# Patient Record
Sex: FEMALE | Race: WHITE | NOT HISPANIC OR LATINO | Employment: OTHER | URBAN - METROPOLITAN AREA
[De-identification: names, ages, dates, MRNs, and addresses within clinical notes are randomized per-mention and may not be internally consistent; named-entity substitution may affect disease eponyms.]

---

## 2017-01-09 ENCOUNTER — COMMUNICATION - HEALTHEAST (OUTPATIENT)
Dept: UROLOGY | Facility: CLINIC | Age: 37
End: 2017-01-09

## 2017-01-09 ENCOUNTER — COMMUNICATION - HEALTHEAST (OUTPATIENT)
Dept: FAMILY MEDICINE | Facility: CLINIC | Age: 37
End: 2017-01-09

## 2017-04-05 ENCOUNTER — COMMUNICATION - HEALTHEAST (OUTPATIENT)
Dept: FAMILY MEDICINE | Facility: CLINIC | Age: 37
End: 2017-04-05

## 2017-04-05 DIAGNOSIS — G43.909 MIGRAINE HEADACHE: ICD-10-CM

## 2017-05-18 ENCOUNTER — COMMUNICATION - HEALTHEAST (OUTPATIENT)
Dept: FAMILY MEDICINE | Facility: CLINIC | Age: 37
End: 2017-05-18

## 2017-05-18 DIAGNOSIS — F41.9 ANXIETY: ICD-10-CM

## 2017-07-07 ENCOUNTER — COMMUNICATION - HEALTHEAST (OUTPATIENT)
Dept: FAMILY MEDICINE | Facility: CLINIC | Age: 37
End: 2017-07-07

## 2017-07-07 DIAGNOSIS — G43.909 MIGRAINE HEADACHE: ICD-10-CM

## 2017-09-15 ENCOUNTER — COMMUNICATION - HEALTHEAST (OUTPATIENT)
Dept: FAMILY MEDICINE | Facility: CLINIC | Age: 37
End: 2017-09-15

## 2017-09-15 DIAGNOSIS — G43.909 MIGRAINE HEADACHE: ICD-10-CM

## 2017-10-01 ENCOUNTER — TRANSFERRED RECORDS (OUTPATIENT)
Dept: HEALTH INFORMATION MANAGEMENT | Facility: CLINIC | Age: 37
End: 2017-10-01

## 2017-10-01 LAB — PAP SMEAR - HIM PATIENT REPORTED: NEGATIVE

## 2017-11-09 ENCOUNTER — OFFICE VISIT - HEALTHEAST (OUTPATIENT)
Dept: FAMILY MEDICINE | Facility: CLINIC | Age: 37
End: 2017-11-09

## 2017-11-09 DIAGNOSIS — F32.5 MAJOR DEPRESSION IN REMISSION (H): ICD-10-CM

## 2017-11-09 DIAGNOSIS — G43.909 MIGRAINE: ICD-10-CM

## 2017-11-09 DIAGNOSIS — Z00.00 ROUTINE GENERAL MEDICAL EXAMINATION AT A HEALTH CARE FACILITY: ICD-10-CM

## 2017-11-09 DIAGNOSIS — G43.909 MIGRAINE HEADACHE: ICD-10-CM

## 2017-11-09 LAB
CHOLEST SERPL-MCNC: 140 MG/DL
FASTING STATUS PATIENT QL REPORTED: NO
HDLC SERPL-MCNC: 40 MG/DL
LDLC SERPL CALC-MCNC: 81 MG/DL
TRIGL SERPL-MCNC: 95 MG/DL

## 2017-11-09 ASSESSMENT — MIFFLIN-ST. JEOR: SCORE: 1732.5

## 2017-11-14 LAB
HPV INTERPRETATION - HISTORICAL: NORMAL
HPV INTERPRETER - HISTORICAL: NORMAL

## 2017-11-15 LAB
BKR LAB AP ABNORMAL BLEEDING: NO
BKR LAB AP BIRTH CONTROL/HORMONES: NORMAL
BKR LAB AP CERVICAL APPEARANCE: NORMAL
BKR LAB AP GYN ADEQUACY: NORMAL
BKR LAB AP GYN INTERPRETATION: NORMAL
BKR LAB AP HPV REFLEX: NORMAL
BKR LAB AP LMP: NORMAL
BKR LAB AP PATIENT STATUS: NO
BKR LAB AP PREVIOUS ABNORMAL: NO
BKR LAB AP PREVIOUS NORMAL: 2013
HIGH RISK?: NO
PATH REPORT.COMMENTS IMP SPEC: NORMAL
RESULT FLAG (HE HISTORICAL CONVERSION): NORMAL

## 2017-11-23 ENCOUNTER — COMMUNICATION - HEALTHEAST (OUTPATIENT)
Dept: FAMILY MEDICINE | Facility: CLINIC | Age: 37
End: 2017-11-23

## 2017-11-23 DIAGNOSIS — F41.9 ANXIETY: ICD-10-CM

## 2018-02-13 ENCOUNTER — COMMUNICATION - HEALTHEAST (OUTPATIENT)
Dept: FAMILY MEDICINE | Facility: CLINIC | Age: 38
End: 2018-02-13

## 2018-02-13 DIAGNOSIS — L29.9 ITCHING: ICD-10-CM

## 2018-03-12 ENCOUNTER — OFFICE VISIT (OUTPATIENT)
Dept: FAMILY MEDICINE | Facility: CLINIC | Age: 38
End: 2018-03-12
Payer: COMMERCIAL

## 2018-03-12 VITALS
WEIGHT: 230 LBS | HEIGHT: 66 IN | DIASTOLIC BLOOD PRESSURE: 70 MMHG | BODY MASS INDEX: 36.96 KG/M2 | SYSTOLIC BLOOD PRESSURE: 115 MMHG | TEMPERATURE: 98 F

## 2018-03-12 DIAGNOSIS — J02.9 VIRAL PHARYNGITIS: ICD-10-CM

## 2018-03-12 DIAGNOSIS — R07.0 THROAT PAIN: Primary | ICD-10-CM

## 2018-03-12 LAB
DEPRECATED S PYO AG THROAT QL EIA: NORMAL
SPECIMEN SOURCE: NORMAL

## 2018-03-12 PROCEDURE — 87081 CULTURE SCREEN ONLY: CPT | Performed by: PHYSICIAN ASSISTANT

## 2018-03-12 PROCEDURE — 87880 STREP A ASSAY W/OPTIC: CPT | Performed by: PHYSICIAN ASSISTANT

## 2018-03-12 PROCEDURE — 99213 OFFICE O/P EST LOW 20 MIN: CPT | Performed by: PHYSICIAN ASSISTANT

## 2018-03-12 RX ORDER — SUMATRIPTAN 100 MG/1
TABLET, FILM COATED ORAL
Refills: 6 | COMMUNITY
Start: 2018-02-24 | End: 2018-11-19

## 2018-03-12 RX ORDER — VENLAFAXINE HYDROCHLORIDE 150 MG/1
CAPSULE, EXTENDED RELEASE ORAL
Refills: 1 | COMMUNITY
Start: 2017-08-20 | End: 2018-06-11

## 2018-03-12 NOTE — Clinical Note
Please abstract the following data from this visit with this patient into the appropriate field in Epic:  Pap smear done on this date: 10/2017 (approximately), by this group: [facility], results were NILM.

## 2018-03-12 NOTE — MR AVS SNAPSHOT
"              After Visit Summary   3/12/2018    Shannon Quiroz    MRN: [id number]           Patient Information     Date Of Birth          1980        Visit Information        Provider Department      3/12/2018 9:40 AM Yulissa Badillo PA-C [facility]        Today's Diagnoses     Throat pain    -  1    Viral pharyngitis          Care Instructions    You can use \"oncare\" which is a way to do testing for acute illness online          Follow-ups after your visit        Who to contact     Normal or non-critical lab and imaging results will be communicated to you by Synoste Oyhart, letter or phone within 4 business days after the clinic has received the results. If you do not hear from us within 7 days, please contact the clinic through Synoste Oyhart or phone. If you have a critical or abnormal lab result, we will notify you by phone as soon as possible.  Submit refill requests through Medocity or call your pharmacy and they will forward the refill request to us. Please allow 3 business days for your refill to be completed.          If you need to speak with a  for additional information , please call: [phone]           Additional Information About Your Visit        Synoste OyharPenBoutique Information     Medocity lets you send messages to your doctor, view your test results, renew your prescriptions, schedule appointments and more. To sign up, go to [email or web address] . Click on \"Log in\" on the left side of the screen, which will take you to the Welcome page. Then click on \"Sign up Now\" on the right side of the page.     You will be asked to enter the access code listed below, as well as some personal information. Please follow the directions to create your username and password.     Your access code is: [email or web address]  Expires: 6/10/2018 10:10 AM     Your access code will  in 90 days. If you need help or a new code, please call your [facility] or [phone].        Care EveryWhere ID  " "   This is your Care EveryWhere ID. This could be used by other organizations to access your [place] medical records  [id number]        Your Vitals Were     Temperature Height Last Period Breastfeeding? BMI (Body Mass Index)       98  F (36.7  C) (Tympanic) 5' 6\" (1.676 m) 02/20/2018 (Approximate) No 37.12 kg/m2        Blood Pressure from Last 3 Encounters:   03/12/18 115/70   02/20/15 112/74   04/27/10 118/80    Weight from Last 3 Encounters:   03/12/18 230 lb (104.3 kg)   04/27/10 212 lb (96.2 kg)   08/11/09 190 lb (86.2 kg)              We Performed the Following     Beta strep group A culture     Rapid strep screen        Primary Care Provider Office Phone # Fax #    Evan Fernandez [phone] [address] [place] [address]        Equal Access to Services     [facility] : Hadii daily hrenandezo Sotammy, waaxda luqadaha, qaybta kaalmada adeegyada, kevon chan . So [facility] [phone].    ATENCIÓN: Si habla español, tiene a alcocer disposición servicios gratuitos de asistencia lingüística. Llame al [phone].    We comply with applicable federal civil rights laws and [place] laws. We do not discriminate on the basis of race, color, national origin, age, disability, sex, sexual orientation, or gender identity.            Thank you!     Thank you for choosing [facility]  for your care. Our goal is always to provide you with excellent care. Hearing back from our patients is one way we can continue to improve our services. Please take a few minutes to complete the written survey that you may receive in the mail after your visit with us. Thank you!             Your Updated Medication List - Protect others around you: Learn how to safely use, store and throw away your medicines at www.disposemymeds.org.          This list is accurate as of 3/12/18 10:11 AM.  Always use your most recent med list.                   Brand Name Dispense " Instructions for use Diagnosis    albuterol 108 (90 BASE) MCG/ACT Inhaler    PROAIR HFA/PROVENTIL HFA/VENTOLIN HFA    1 Inhaler    2 puffs every 4-6 hours as needed for cough/shortness of breath    Bronchitis with bronchospasm       SUMAtriptan 100 MG tablet    IMITREX     TAKE ONE TAB BY MOUTH AT ONSET OF HEADACHE, MAY REPEAT ONCE IN 2 HOURS. MAX 200MG IN 24 HOURS        venlafaxine 150 MG 24 hr capsule    EFFEXOR-XR     TAKE 1 CAPSULE (150 MG TOTAL) BY MOUTH DAILY.

## 2018-03-12 NOTE — PROGRESS NOTES
SUBJECTIVE:   Shannon Quiroz is a 37 year old female who presents to clinic today for the following health issues:      ENT Symptoms             Symptoms: cc Present Absent Comment   Fever/Chills   x    Fatigue  x     Muscle Aches   x    Eye Irritation   x    Sneezing   x    Nasal Alex/Drg   x    Sinus Pressure/Pain   x    Loss of smell   x    Dental pain   x    Sore Throat x x     Swollen Glands       Ear Pain/Fullness  x  Worse on left but bilateral.    Cough  x  Productive of phlegm   Wheeze   x    Chest Pain   x    Shortness of breath   x    Rash   x    Other   x      Symptom duration:  day 5    Symptom severity:  moderate and symptoms worsening with time   Treatments tried:  Advil    Contacts:   [place], sisters, mother all tersted positive for strep.              Problem list and histories reviewed & adjusted, as indicated.  Additional history: as documented    Patient Active Problem List   Diagnosis     Cholelithiasis without cholecystitis     Epigastric pain     Abdominal pain     CARDIOVASCULAR SCREENING; LDL GOAL LESS THAN 160     Past Surgical History:   Procedure Laterality Date     CHOLECYSTECTOMY, LAPOROSCOPIC  6-1-09     SURGICAL HISTORY OF -   1/7/09    vaginal birth     SURGICAL HISTORY OF -   3/1/08    Kidney stone removed       Social History   Substance Use Topics     Smoking status: Never Smoker     Smokeless tobacco: Not on file     Alcohol use No     Family History   Problem Relation Age of Onset     Breast Cancer Maternal Grandmother      age 70     CANCER Paternal Grandfather      age 40, brain cancer     Breast Cancer Mother          Current Outpatient Prescriptions   Medication Sig Dispense Refill     SUMAtriptan (IMITREX) 100 MG tablet TAKE ONE TAB BY MOUTH AT ONSET OF HEADACHE, MAY REPEAT ONCE IN 2 HOURS. MAX 200MG IN 24 HOURS  6     venlafaxine (EFFEXOR-XR) 150 MG 24 hr capsule TAKE 1 CAPSULE (150 MG TOTAL) BY MOUTH DAILY.  1     ALBUTEROL SULFATE  (90 BASE) MCG/ACT IN AERS 2  "puffs every 4-6 hours as needed for cough/shortness of breath (Patient not taking: No sig reported) 1 Inhaler 0     BP Readings from Last 3 Encounters:   03/12/18 115/70   02/20/15 112/74   04/27/10 118/80    Wt Readings from Last 3 Encounters:   03/12/18 230 lb (104.3 kg)   04/27/10 212 lb (96.2 kg)   08/11/09 190 lb (86.2 kg)                    Reviewed and updated as needed this visit by clinical staff       Reviewed and updated as needed this visit by Provider         ROS:  Constitutional, HEENT, cardiovascular, pulmonary, GI, , musculoskeletal, neuro, skin, endocrine and psych systems are negative, except as otherwise noted.    OBJECTIVE:     /70  Temp 98  F (36.7  C) (Tympanic)  Ht 5' 6\" (1.676 m)  Wt 230 lb (104.3 kg)  LMP 02/20/2018 (Approximate)  Breastfeeding? No  BMI 37.12 kg/m2  Body mass index is 37.12 kg/(m^2).  GENERAL: healthy, alert and no distress  EYES: Eyes grossly normal to inspection, PERRL and conjunctivae and sclerae normal  HENT: ear canals and TM's normal, nose and mouth without ulcers or lesions  NECK: no adenopathy, no asymmetry, masses, or scars and thyroid normal to palpation  RESP: lungs clear to auscultation - no rales, rhonchi or wheezes  CV: regular rate and rhythm, normal S1 S2, no S3 or S4, no murmur, click or rub, no peripheral edema and peripheral pulses strong  ABDOMEN: soft, nontender, no hepatosplenomegaly, no masses and bowel sounds normal  MS: no gross musculoskeletal defects noted, no edema    Diagnostic Test Results:  Results for orders placed or performed in visit on 03/12/18 (from the past 24 hour(s))   Rapid strep screen   Result Value Ref Range    Specimen Description Throat     Rapid Strep A Screen       NEGATIVE: No Group A streptococcal antigen detected by immunoassay, await culture report.       ASSESSMENT/PLAN:       1. Throat pain  Rapid strep was neg, will call if culture is positive   - Rapid strep screen  - Beta strep group A culture    2. Viral " pharyngitis  Pharyngitis  (primary encounter diagnosis)     I discussed the pathophysiology of pharyngitis and likely viral etiology.   Discussed general respiratory tract infection care including importance of hydration, rest, over the counter therapies and techniques to prevent future infection as well as transmission to others.  Discussed signs or symptoms that would indicate need for recheck.    Patient was instructed to f/u or call if symptoms worsen or fail to improve as anticipated.    Pap done at [facility] and abstracted by Anika.     Yulissa Badillo PA-C  [facility]

## 2018-03-12 NOTE — NURSING NOTE
"Chief Complaint   Patient presents with     Pharyngitis       Initial /70  Temp 98  F (36.7  C) (Tympanic)  Ht 5' 6\" (1.676 m)  Wt 230 lb (104.3 kg)  LMP 02/20/2018 (Approximate)  Breastfeeding? No  BMI 37.12 kg/m2 Estimated body mass index is 37.12 kg/(m^2) as calculated from the following:    Height as of this encounter: 5' 6\" (1.676 m).    Weight as of this encounter: 230 lb (104.3 kg).  Medication Reconciliation: complete    "

## 2018-03-13 LAB
BACTERIA SPEC CULT: NORMAL
SPECIMEN SOURCE: NORMAL

## 2018-04-02 ENCOUNTER — OFFICE VISIT (OUTPATIENT)
Dept: FAMILY MEDICINE | Facility: CLINIC | Age: 38
End: 2018-04-02
Payer: COMMERCIAL

## 2018-04-02 ENCOUNTER — OFFICE VISIT (OUTPATIENT)
Dept: OTOLARYNGOLOGY | Facility: CLINIC | Age: 38
End: 2018-04-02
Payer: COMMERCIAL

## 2018-04-02 VITALS — BODY MASS INDEX: 38.9 KG/M2 | WEIGHT: 241 LBS

## 2018-04-02 VITALS
HEIGHT: 66 IN | WEIGHT: 241.4 LBS | TEMPERATURE: 98.4 F | SYSTOLIC BLOOD PRESSURE: 112 MMHG | DIASTOLIC BLOOD PRESSURE: 78 MMHG | HEART RATE: 116 BPM | BODY MASS INDEX: 38.8 KG/M2

## 2018-04-02 DIAGNOSIS — J03.01 ACUTE RECURRENT STREPTOCOCCAL TONSILLITIS: Primary | ICD-10-CM

## 2018-04-02 DIAGNOSIS — J02.0 ACUTE STREPTOCOCCAL PHARYNGITIS: Primary | ICD-10-CM

## 2018-04-02 DIAGNOSIS — R07.0 THROAT PAIN: ICD-10-CM

## 2018-04-02 LAB
DEPRECATED S PYO AG THROAT QL EIA: ABNORMAL
SPECIMEN SOURCE: ABNORMAL

## 2018-04-02 PROCEDURE — 99213 OFFICE O/P EST LOW 20 MIN: CPT | Performed by: FAMILY MEDICINE

## 2018-04-02 PROCEDURE — 99243 OFF/OP CNSLTJ NEW/EST LOW 30: CPT | Performed by: OTOLARYNGOLOGY

## 2018-04-02 PROCEDURE — 87880 STREP A ASSAY W/OPTIC: CPT | Performed by: FAMILY MEDICINE

## 2018-04-02 RX ORDER — PENICILLIN V POTASSIUM 500 MG/1
500 TABLET, FILM COATED ORAL 3 TIMES DAILY
Qty: 30 TABLET | Refills: 0 | Status: SHIPPED | OUTPATIENT
Start: 2018-04-02 | End: 2018-05-21

## 2018-04-02 RX ORDER — OXYCODONE AND ACETAMINOPHEN 5; 325 MG/1; MG/1
1 TABLET ORAL EVERY 6 HOURS PRN
Qty: 10 TABLET | Refills: 0 | Status: SHIPPED | OUTPATIENT
Start: 2018-04-02 | End: 2018-05-21

## 2018-04-02 ASSESSMENT — PAIN SCALES - GENERAL: PAINLEVEL: WORST PAIN (10)

## 2018-04-02 NOTE — MR AVS SNAPSHOT
After Visit Summary   4/2/2018    Shannon Quiroz    MRN: [id number]           Patient Information     Date Of Birth          1980        Visit Information        Provider Department      4/2/2018 9:00 AM Jennifer Maldonado DO [facility]        Today's Diagnoses     Throat pain    -  1    Acute streptococcal pharyngitis          Care Instructions    Your strep test was positive today.  We'll treat with the penicillin.  Please begin this right away and make sure to complete all 10 days of antibiotic.    Due to the appearance of your throat I am concerned you might be developing an abscess.  We made you an appointment for 12:45 today with ENT at [facility].  Please make sure you go in for that appointment.  If they are concerned about abscess they can discuss drainage at that time              Follow-ups after your visit        Additional Services     OTOLARYNGOLOGY REFERRAL       Your provider has referred you to: [facility]: [facility] [phone]   [email or web address]  [facility]: [facility] [phone]   [email or web address]    Please be aware that coverage of these services is subject to the terms and limitations of your health insurance plan.  Call member services at your health plan with any benefit or coverage questions.      Please bring the following with you to your appointment:    (1) Any X-Rays, CTs or MRIs which have been performed.  Contact the facility where they were done to arrange for  prior to your scheduled appointment.   (2) List of current medications  (3) This referral request   (4) Any documents/labs given to you for this referral                  Your next 10 appointments already scheduled     Apr 02, 2018 12:45 PM CDT   New [facility]andra [facility]   [facility]    [address] [place] [address]  "  [phone]              Who to contact     Normal or non-critical lab and imaging results will be communicated to you by TDI Basslinehart, letter or phone within 4 business days after the clinic has received the results. If you do not hear from us within 7 days, please contact the clinic through MyChart or phone. If you have a critical or abnormal lab result, we will notify you by phone as soon as possible.  Submit refill requests through Casacanda or call your pharmacy and they will forward the refill request to us. Please allow 3 business days for your refill to be completed.          If you need to speak with a  for additional information , please call: [phone]           Additional Information About Your Visit        Casacanda Information     Casacanda lets you send messages to your doctor, view your test results, renew your prescriptions, schedule appointments and more. To sign up, go to [email or web address] . Click on \"Log in\" on the left side of the screen, which will take you to the Welcome page. Then click on \"Sign up Now\" on the right side of the page.     You will be asked to enter the access code listed below, as well as some personal information. Please follow the directions to create your username and password.     Your access code is: [email or web address]  Expires: 6/10/2018 10:10 AM     Your access code will  in 90 days. If you need help or a new code, please call your [place] clinic or [phone].        Care EveryWhere ID     This is your Care EveryWhere ID. This could be used by other organizations to access your [place] medical records  [id number]        Your Vitals Were     Pulse Temperature Height Breastfeeding? BMI (Body Mass Index)       116 98.4  F (36.9  C) (Tympanic) 5' 6\" (1.676 m) No 38.96 kg/m2        Blood Pressure from Last 3 Encounters:   18 112/78   18 115/70   02/20/15 112/74    Weight from Last 3 Encounters:   18 241 lb 6.4 oz (109.5 kg) "   03/12/18 230 lb (104.3 kg)   04/27/10 212 lb (96.2 kg)              We Performed the Following     OTOLARYNGOLOGY REFERRAL     Strep, Rapid Screen          Today's Medication Changes          These changes are accurate as of 4/2/18 10:02 AM.  If you have any questions, ask your nurse or doctor.               Start taking these medicines.        Dose/Directions    penicillin V potassium 500 MG tablet   Commonly known as:  VEETID   Used for:  Acute streptococcal pharyngitis   Started by:  Jennifer Maldonado DO        Dose:  500 mg   Take 1 tablet (500 mg) by mouth 3 times daily   Quantity:  30 tablet   Refills:  0            Where to get your medicines      These medications were sent to [facility] [address]  [address] [place] [address]     Phone:  [phone]     penicillin V potassium 500 MG tablet                Primary Care Provider Office Phone # Fax #    Evan Fernandez [phone] [address]   [facility] [address]        Equal Access to Services     [facility]: Hadii aad ku hadasho Soomaali, waaxda luqadaha, qaybta kaalmada adeegyada, waxay idiin haymartinn zachary chan . So [facility] [phone].    ATENCIÓN: Si habla español, tiene a alcocer disposición servicios gratuitos de asistencia lingüística. Llame al [phone].    We comply with applicable federal civil rights laws and [place] laws. We do not discriminate on the basis of race, color, national origin, age, disability, sex, sexual orientation, or gender identity.            Thank you!     Thank you for choosing [facility]  for your care. Our goal is always to provide you with excellent care. Hearing back from our patients is one way we can continue to improve our services. Please take a few minutes to complete the written survey that you may receive in the mail after your visit with us. Thank you!             Your Updated Medication List - Protect  others around you: Learn how to safely use, store and throw away your medicines at www.disposemymeds.org.          This list is accurate as of 4/2/18 10:02 AM.  Always use your most recent med list.                   Brand Name Dispense Instructions for use Diagnosis    penicillin V potassium 500 MG tablet    VEETID    30 tablet    Take 1 tablet (500 mg) by mouth 3 times daily    Acute streptococcal pharyngitis       SUMAtriptan 100 MG tablet    IMITREX     TAKE ONE TAB BY MOUTH AT ONSET OF HEADACHE, MAY REPEAT ONCE IN 2 HOURS. MAX 200MG IN 24 HOURS        venlafaxine 150 MG 24 hr capsule    EFFEXOR-XR     TAKE 1 CAPSULE (150 MG TOTAL) BY MOUTH DAILY.

## 2018-04-02 NOTE — MR AVS SNAPSHOT
"              After Visit Summary   2018    Shannon Quiroz    MRN: [id number]           Patient Information     Date Of Birth          1980        Visit Information        Provider Department      2018 12:45 PM Sugey Chandra MD [facility]        Today's Diagnoses     Acute recurrent streptococcal tonsillitis    -  1      Care Instructions    Per physician's instructions            Follow-ups after your visit        Who to contact     If you have questions or need follow up information about today's clinic visit or your schedule please contact [facility] directly at [phone].  Normal or non-critical lab and imaging results will be communicated to you by Novira Therapeuticshart, letter or phone within 4 business days after the clinic has received the results. If you do not hear from us within 7 days, please contact the clinic through Novira Therapeuticshart or phone. If you have a critical or abnormal lab result, we will notify you by phone as soon as possible.  Submit refill requests through Snakk Media or call your pharmacy and they will forward the refill request to us. Please allow 3 business days for your refill to be completed.          Additional Information About Your Visit        MyChart Information     [employer] Media lets you send messages to your doctor, view your test results, renew your prescriptions, schedule appointments and more. To sign up, go to [email or web address] . Click on \"Log in\" on the left side of the screen, which will take you to the Welcome page. Then click on \"Sign up Now\" on the right side of the page.     You will be asked to enter the access code listed below, as well as some personal information. Please follow the directions to create your username and password.     Your access code is: [email or web address]  Expires: 6/10/2018 10:10 AM     Your access code will  in 90 days. If you need help or a new code, please call your [facility] or [phone].        Care EveryWhere ID  "    This is your Care EveryWhere ID. This could be used by other organizations to access your [place] medical records  [id number]        Your Vitals Were     BMI (Body Mass Index)                   38.9 kg/m2            Blood Pressure from Last 3 Encounters:   04/02/18 112/78   03/12/18 115/70   02/20/15 112/74    Weight from Last 3 Encounters:   04/02/18 109.3 kg (241 lb)   04/02/18 109.5 kg (241 lb 6.4 oz)   03/12/18 104.3 kg (230 lb)              Today, you had the following     No orders found for display         Today's Medication Changes          These changes are accurate as of 4/2/18  1:30 PM.  If you have any questions, ask your nurse or doctor.               Start taking these medicines.        Dose/Directions    oxyCODONE-acetaminophen 5-325 MG per tablet   Commonly known as:  PERCOCET   Used for:  Acute recurrent streptococcal tonsillitis   Started by:  Sugey Chandra MD        Dose:  1 tablet   Take 1 tablet by mouth every 6 hours as needed for severe pain   Quantity:  10 tablet   Refills:  0       penicillin V potassium 500 MG tablet   Commonly known as:  VEETID   Used for:  Acute streptococcal pharyngitis   Started by:  Jennifer Maldonado DO        Dose:  500 mg   Take 1 tablet (500 mg) by mouth 3 times daily   Quantity:  30 tablet   Refills:  0            Where to get your medicines      These medications were sent to [facility]  [address]  [address]  [address] [place] [address]     Phone:  [phone]     penicillin V potassium 500 MG tablet         Some of these will need a paper prescription and others can be bought over the counter.  Ask your nurse if you have questions.     Bring a paper prescription for each of these medications     oxyCODONE-acetaminophen 5-325 MG per tablet                Primary Care Provider Office Phone # Fax #    Evan Fernandez [phone] [address]  [place] [address]        Equal Access to  Services     [facility]: Hadii aad ku hadkasioscar Farhanaali, waaxda luqadaha, qaybta kaalmada tim, kevon chan . So [facility] [phone].    ATENCIÓN: Si peyton montoya, tiene a alcocer disposición servicios gratuitos de asistencia lingüística. Llame al [phone].    We comply with applicable federal civil rights laws and [place] laws. We do not discriminate on the basis of race, color, national origin, age, disability, sex, sexual orientation, or gender identity.            Thank you!     Thank you for choosing [facility]  for your care. Our goal is always to provide you with excellent care. Hearing back from our patients is one way we can continue to improve our services. Please take a few minutes to complete the written survey that you may receive in the mail after your visit with us. Thank you!             Your Updated Medication List - Protect others around you: Learn how to safely use, store and throw away your medicines at www.disposemymeds.org.          This list is accurate as of 4/2/18  1:30 PM.  Always use your most recent med list.                   Brand Name Dispense Instructions for use Diagnosis    oxyCODONE-acetaminophen 5-325 MG per tablet    PERCOCET    10 tablet    Take 1 tablet by mouth every 6 hours as needed for severe pain    Acute recurrent streptococcal tonsillitis       penicillin V potassium 500 MG tablet    VEETID    30 tablet    Take 1 tablet (500 mg) by mouth 3 times daily    Acute streptococcal pharyngitis       SUMAtriptan 100 MG tablet    IMITREX     TAKE ONE TAB BY MOUTH AT ONSET OF HEADACHE, MAY REPEAT ONCE IN 2 HOURS. MAX 200MG IN 24 HOURS        venlafaxine 150 MG 24 hr capsule    EFFEXOR-XR     TAKE 1 CAPSULE (150 MG TOTAL) BY MOUTH DAILY.

## 2018-04-02 NOTE — PROGRESS NOTES
History of Present Illness - Shannon Quiroz is a 37 year old female seen in consultation at the request of Dr. Maldonado for throat pain. She was seen this morning with bilateral tonsil pain, worse on the left. She had a positive strep test. Her throat pain began about 3 days ago.       Past Medical History -   Patient Active Problem List   Diagnosis     Cholelithiasis without cholecystitis     Epigastric pain     Abdominal pain     CARDIOVASCULAR SCREENING; LDL GOAL LESS THAN 160       Current Medications -   Current Outpatient Prescriptions:      penicillin V potassium (VEETID) 500 MG tablet, Take 1 tablet (500 mg) by mouth 3 times daily, Disp: 30 tablet, Rfl: 0     oxyCODONE-acetaminophen (PERCOCET) 5-325 MG per tablet, Take 1 tablet by mouth every 6 hours as needed for severe pain, Disp: 10 tablet, Rfl: 0     venlafaxine (EFFEXOR-XR) 150 MG 24 hr capsule, TAKE 1 CAPSULE (150 MG TOTAL) BY MOUTH DAILY., Disp: , Rfl: 1     SUMAtriptan (IMITREX) 100 MG tablet, TAKE ONE TAB BY MOUTH AT ONSET OF HEADACHE, MAY REPEAT ONCE IN 2 HOURS. MAX 200MG IN 24 HOURS, Disp: , Rfl: 6    Allergies - No Known Allergies    Social History -   Social History     Social History     Marital status:      Spouse name: N/A     Number of children: N/A     Years of education: N/A     Social History Main Topics     Smoking status: Never Smoker     Smokeless tobacco: Never Used     Alcohol use No     Drug use: Not on file     Sexual activity: Yes     Partners: Male     Other Topics Concern     Not on file     Social History Narrative       Family History -   Family History   Problem Relation Age of Onset     Breast Cancer Maternal Grandmother      age 70     CANCER Paternal Grandfather      age 40, brain cancer     Breast Cancer Mother        Review of Systems - As per HPI and PMHx, otherwise 7 system review of the head and neck negative. 10+ system review negative.    Physical Exam  Wt 109.3 kg (241 lb)  BMI 38.9 kg/m2  General - The  patient is well nourished and well developed, and appears to have good nutritional status.  Alert and oriented to person and place, answers questions and cooperates with examination appropriately.   Head and Face - Normocephalic and atraumatic, with no gross asymmetry noted of the contour of the facial features.  The facial nerve is intact, with strong symmetric movements.  Voice and Breathing - The patient was breathing comfortably without the use of accessory muscles. There was no wheezing, stridor, or stertor.  The patients voice was clear and strong, and had appropriate pitch and quality.  Ears - Bilateral pinna and EACs with normal appearing overlying skin. Tympanic membrane intact with good mobility on pneumatic otoscopy bilaterally. Bony landmarks of the ossicular chain are normal. The tympanic membranes are normal in appearance. No retraction, perforation, or masses.  No fluid or purulence was seen in the external canal or the middle ear.   Eyes - Extraocular movements intact.  Sclera were not icteric or injected, conjunctiva were pink and moist.  Mouth - Examination of the oral cavity showed pink, healthy oral mucosa. No lesions or ulcerations noted.  The tongue was mobile and midline, and the dentition were in good condition.    Throat - The walls of the oropharynx were smooth, pink, moist, symmetric, and had no lesions or ulcerations.  The tonsillar pillars and soft palate were symmetric.  The uvula was midline on elevation.  Neck - Normal midline excursion of the laryngotracheal complex during swallowing.  Full range of motion on passive movement.  Palpation of the occipital, submental, submandibular, internal jugular chain, and supraclavicular nodes did not demonstrate any abnormal lymph nodes or masses.  The carotid pulse was palpable bilaterally.  Palpation of the thyroid was soft and smooth, with no nodules or goiter appreciated.  The trachea was mobile and midline.  Nose - External contour is  symmetric, no gross deflection or scars.  Nasal mucosa is pink and moist with no abnormal mucus.  The septum was midline and non-obstructive, turbinates of normal size and position.  No polyps, masses, or purulence noted on examination.          Assessment - Shannon Quiroz is a 37 year old female with acute streptococcal tonsillitis. I recommended she continue with PCN antibiotic therapy. I added some narcotic analgesics for pain control. I advised her to return in 2 days if not improving. I discussed hand washing and prevention of spreading the contagious disease.         Dr. Sugey Chandra MD  Otolaryngology  [facility]

## 2018-04-02 NOTE — LETTER
4/2/2018         RE: Shannon Quiroz  [address] [facility] [id number]        Dear Colleague,    Thank you for referring your patient, Shannon Quiroz, to the [facility]. Please see a copy of my visit note below.        History of Present Illness - Shannon Quiroz is a 37 year old female seen in consultation at the request of Dr. Maldonado for throat pain. She was seen this morning with bilateral tonsil pain, worse on the left. She had a positive strep test. Her throat pain began about 3 days ago.       Past Medical History -   Patient Active Problem List   Diagnosis     Cholelithiasis without cholecystitis     Epigastric pain     Abdominal pain     CARDIOVASCULAR SCREENING; LDL GOAL LESS THAN 160       Current Medications -   Current Outpatient Prescriptions:      penicillin V potassium (VEETID) 500 MG tablet, Take 1 tablet (500 mg) by mouth 3 times daily, Disp: 30 tablet, Rfl: 0     oxyCODONE-acetaminophen (PERCOCET) 5-325 MG per tablet, Take 1 tablet by mouth every 6 hours as needed for severe pain, Disp: 10 tablet, Rfl: 0     venlafaxine (EFFEXOR-XR) 150 MG 24 hr capsule, TAKE 1 CAPSULE (150 MG TOTAL) BY MOUTH DAILY., Disp: , Rfl: 1     SUMAtriptan (IMITREX) 100 MG tablet, TAKE ONE TAB BY MOUTH AT ONSET OF HEADACHE, MAY REPEAT ONCE IN 2 HOURS. MAX 200MG IN 24 HOURS, Disp: , Rfl: 6    Allergies - No Known Allergies    Social History -   Social History     Social History     Marital status:      Spouse name: N/A     Number of children: N/A     Years of education: N/A     Social History Main Topics     Smoking status: Never Smoker     Smokeless tobacco: Never Used     Alcohol use No     Drug use: Not on file     Sexual activity: Yes     Partners: Male     Other Topics Concern     Not on file     Social History Narrative       Family History -   Family History   Problem Relation Age of Onset     Breast Cancer Maternal Grandmother      age 70     CANCER Paternal Grandfather      age 40, brain  cancer     Breast Cancer Mother        Review of Systems - As per HPI and PMHx, otherwise 7 system review of the head and neck negative. 10+ system review negative.    Physical Exam  Wt 109.3 kg (241 lb)  BMI 38.9 kg/m2  General - The patient is well nourished and well developed, and appears to have good nutritional status.  Alert and oriented to person and place, answers questions and cooperates with examination appropriately.   Head and Face - Normocephalic and atraumatic, with no gross asymmetry noted of the contour of the facial features.  The facial nerve is intact, with strong symmetric movements.  Voice and Breathing - The patient was breathing comfortably without the use of accessory muscles. There was no wheezing, stridor, or stertor.  The patients voice was clear and strong, and had appropriate pitch and quality.  Ears - Bilateral pinna and EACs with normal appearing overlying skin. Tympanic membrane intact with good mobility on pneumatic otoscopy bilaterally. Bony landmarks of the ossicular chain are normal. The tympanic membranes are normal in appearance. No retraction, perforation, or masses.  No fluid or purulence was seen in the external canal or the middle ear.   Eyes - Extraocular movements intact.  Sclera were not icteric or injected, conjunctiva were pink and moist.  Mouth - Examination of the oral cavity showed pink, healthy oral mucosa. No lesions or ulcerations noted.  The tongue was mobile and midline, and the dentition were in good condition.    Throat - The walls of the oropharynx were smooth, pink, moist, symmetric, and had no lesions or ulcerations.  The tonsillar pillars and soft palate were symmetric.  The uvula was midline on elevation.  Neck - Normal midline excursion of the laryngotracheal complex during swallowing.  Full range of motion on passive movement.  Palpation of the occipital, submental, submandibular, internal jugular chain, and supraclavicular nodes did not demonstrate  any abnormal lymph nodes or masses.  The carotid pulse was palpable bilaterally.  Palpation of the thyroid was soft and smooth, with no nodules or goiter appreciated.  The trachea was mobile and midline.  Nose - External contour is symmetric, no gross deflection or scars.  Nasal mucosa is pink and moist with no abnormal mucus.  The septum was midline and non-obstructive, turbinates of normal size and position.  No polyps, masses, or purulence noted on examination.          Assessment - Shannon Quiroz is a 37 year old female with acute streptococcal tonsillitis. I recommended she continue with PCN antibiotic therapy. I added some narcotic analgesics for pain control. I advised her to return in 2 days if not improving. I discussed hand washing and prevention of spreading the contagious disease.         Dr. Sugey Chandra MD  Otolaryngology  [facility]        Again, thank you for allowing me to participate in the care of your patient.        Sincerely,        Sugey Chandra MD

## 2018-04-02 NOTE — PROGRESS NOTES
"  SUBJECTIVE:   Shannon Quiroz is a 37 year old female who presents to clinic today for the following health issues:    ENT Symptoms             Symptoms: cc Present Absent Comment   Fever/Chills  x  Temp of 100 with ibuprofen, chills   Fatigue  x     Muscle Aches  x     Eye Irritation   x    Sneezing   x    Nasal Alex/Drg   x    Sinus Pressure/Pain   x    Loss of smell   x    Dental pain   x    Sore Throat x x     Swollen Glands  x     Ear Pain/Fullness  x  Left ear pain   Cough   x    Wheeze   x    Chest Pain   x    Shortness of breath   x    Rash   x    Other  x  Headache      Symptom duration:  3 days   Symptom severity:  severe   Treatments tried:  ibuprofen    Contacts:  family     Was seen 2 weeks ago for sore throat.  Felt like things got better, \"manageable\" but not resolved.  Now symptoms worse the last 3 days, her whole throat hurts.    Has been taking advil \"nonstop\".  Has been chilled, hot/cold, body aches.    Has been trying to drink but not eating  Was ill and vomited on Saturday.  Feels it hurts too much to eat, no further vomiting.  No diarrhea.      Family has been ill with a variety of symptoms.      Problem list and histories reviewed & adjusted, as indicated.  Additional history: as documented      Reviewed and updated as needed this visit by clinical staff  Tobacco  Allergies  Meds  Med Hx  Surg Hx  Fam Hx  Soc Hx      Reviewed and updated as needed this visit by Provider  Tobacco  Med Hx  Surg Hx  Fam Hx  Soc Hx      ROS: Remainder of Constitutional, CV, Respiratory, GI,  negative with exception of that mentioned above    PE:  VS as above   Gen:  WN/WD/WH female in mild/moderate distress due to pain.   HEENT:  NC/AT, conjunctiva wnl, TM's wnl gaby pearly gray with good light reflex, oropharynx erythematous with scant exudate on the R tonsil.  L peritonsillar area somewhat asymmetric and edge of soft palate L more erythematous, ? early peritonsillar abscess   Neck:  Supple, gaby " anterior cervical adenopathy   Heart:  RRR without murmur, nl S1, S2, no rubs or gallops.nexlungs    Rapid strep positive    A/P:      ICD-10-CM    1. Acute streptococcal pharyngitis J02.0 OTOLARYNGOLOGY REFERRAL     penicillin V potassium (VEETID) 500 MG tablet   2. Throat pain R07.0 Strep, Rapid Screen     OTOLARYNGOLOGY REFERRAL     Patient Instructions   Your strep test was positive today.  We'll treat with the penicillin.  Please begin this right away and make sure to complete all 10 days of antibiotic.    Due to the appearance of your throat I am concerned you might be developing an abscess.  We made you an appointment for 12:45 today with ENT at [facility] in [place].  Please make sure you go in for that appointment.  If they are concerned about abscess they can discuss drainage at that time

## 2018-04-02 NOTE — NURSING NOTE
"Initial Wt 109.3 kg (241 lb)  BMI 38.9 kg/m2 Estimated body mass index is 38.9 kg/(m^2) as calculated from the following:    Height as of an earlier encounter on 4/2/18: 1.676 m (5' 6\").    Weight as of this encounter: 109.3 kg (241 lb). .    Essence Caruso LPN    "

## 2018-04-02 NOTE — PATIENT INSTRUCTIONS
Your strep test was positive today.  We'll treat with the penicillin.  Please begin this right away and make sure to complete all 10 days of antibiotic.    Due to the appearance of your throat I am concerned you might be developing an abscess.  We made you an appointment for 12:45 today with ENT at [facility] in [place].  Please make sure you go in for that appointment.  If they are concerned about abscess they can discuss drainage at that time

## 2018-04-02 NOTE — NURSING NOTE
"Initial /78  Pulse 116  Temp 98.4  F (36.9  C) (Tympanic)  Ht 5' 6\" (1.676 m)  Wt 241 lb 6.4 oz (109.5 kg)  Breastfeeding? No  BMI 38.96 kg/m2 Estimated body mass index is 38.96 kg/(m^2) as calculated from the following:    Height as of this encounter: 5' 6\" (1.676 m).    Weight as of this encounter: 241 lb 6.4 oz (109.5 kg). .      "

## 2018-04-03 ENCOUNTER — TELEPHONE (OUTPATIENT)
Dept: FAMILY MEDICINE | Facility: CLINIC | Age: 38
End: 2018-04-03

## 2018-04-03 NOTE — TELEPHONE ENCOUNTER
Patient's mom reports that she is in so much pain that she cannot swallow. Mom is worried that she is getting dehydrated. Her urine is dark and concentrated. She is trying to suck on ice cubes, but is in pain. Mom is requesting she get an injection for pain. Advised mom she has a written prescription for percocet that she should get filled. If she is unable to swallow, pain is out of control, or getting dehydrated, she needs to go to ED. Mom agreed.  Joycelyn Salas RN

## 2018-04-03 NOTE — TELEPHONE ENCOUNTER
Patient was in yesterday and has strep.  Patient's mom called today and said patient is not doing well she cannot swallow and mom is afraid she going to get dehydrated.   patient cannot talk so mom is there taking care of her and her 3 kids.    Quin is moms name [phone].    Mercedes Lakhani, [facility]

## 2018-05-21 ENCOUNTER — OFFICE VISIT (OUTPATIENT)
Dept: FAMILY MEDICINE | Facility: CLINIC | Age: 38
End: 2018-05-21
Payer: COMMERCIAL

## 2018-05-21 VITALS
HEIGHT: 66 IN | WEIGHT: 245.2 LBS | BODY MASS INDEX: 39.41 KG/M2 | DIASTOLIC BLOOD PRESSURE: 70 MMHG | SYSTOLIC BLOOD PRESSURE: 110 MMHG | TEMPERATURE: 99.4 F

## 2018-05-21 DIAGNOSIS — R30.0 DYSURIA: ICD-10-CM

## 2018-05-21 DIAGNOSIS — J20.9 ACUTE BRONCHITIS, UNSPECIFIED ORGANISM: Primary | ICD-10-CM

## 2018-05-21 LAB
ALBUMIN UR-MCNC: 30 MG/DL
APPEARANCE UR: CLEAR
BACTERIA #/AREA URNS HPF: ABNORMAL /HPF
BILIRUB UR QL STRIP: ABNORMAL
COLOR UR AUTO: YELLOW
GLUCOSE UR STRIP-MCNC: NEGATIVE MG/DL
HGB UR QL STRIP: NEGATIVE
KETONES UR STRIP-MCNC: ABNORMAL MG/DL
LEUKOCYTE ESTERASE UR QL STRIP: NEGATIVE
MUCOUS THREADS #/AREA URNS LPF: PRESENT /LPF
NITRATE UR QL: NEGATIVE
NON-SQ EPI CELLS #/AREA URNS LPF: ABNORMAL /LPF
PH UR STRIP: 5.5 PH (ref 5–7)
RBC #/AREA URNS AUTO: ABNORMAL /HPF
SOURCE: ABNORMAL
SP GR UR STRIP: >1.03 (ref 1–1.03)
UROBILINOGEN UR STRIP-ACNC: 1 EU/DL (ref 0.2–1)
WBC #/AREA URNS AUTO: ABNORMAL /HPF

## 2018-05-21 PROCEDURE — 81001 URINALYSIS AUTO W/SCOPE: CPT | Performed by: PHYSICIAN ASSISTANT

## 2018-05-21 PROCEDURE — 99214 OFFICE O/P EST MOD 30 MIN: CPT | Performed by: PHYSICIAN ASSISTANT

## 2018-05-21 RX ORDER — SULFAMETHOXAZOLE/TRIMETHOPRIM 800-160 MG
1 TABLET ORAL 2 TIMES DAILY
Qty: 10 TABLET | Refills: 0 | Status: SHIPPED | OUTPATIENT
Start: 2018-05-21 | End: 2018-05-26

## 2018-05-21 RX ORDER — BENZONATATE 200 MG/1
200 CAPSULE ORAL 3 TIMES DAILY PRN
Qty: 21 CAPSULE | Refills: 0 | Status: SHIPPED | OUTPATIENT
Start: 2018-05-21 | End: 2018-06-11

## 2018-05-21 NOTE — PROGRESS NOTES
SUBJECTIVE:   Shannon Quiroz is a 38 year old female who presents to clinic today for the following health issues:      ENT Symptoms             Symptoms: cc Present Absent Comment   Fever/Chills  x     Fatigue  x     Muscle Aches   x    Eye Irritation   x    Sneezing   x    Nasal Alex/Drg  x     Sinus Pressure/Pain   x    Loss of smell   x    Dental pain   x    Sore Throat   x    Swollen Glands   x    Ear Pain/Fullness   x    Cough  x     Wheeze   x    Chest Pain   x    Shortness of breath   x    Rash   x    Other   x      Symptom duration:  day    Symptom severity:  moderate   Treatments tried:  Nyquil    Contacts:  none        URINARY TRACT SYMPTOMS  Onset: day 3     Description:   Painful urination (Dysuria): YES  Blood in urine (Hematuria): no   Delay in urine (Hesitency): YES    Intensity: mild    Progression of Symptoms:  worsening    Accompanying Signs & Symptoms:  Fever/chills: YES  Flank pain YES  Nausea and vomiting: YES- Nausea   Any vaginal symptoms: none  Abdominal/Pelvic Pain: YES    History:   History of frequent UTI's: no   History of kidney stones: no   Sexually Active: YES  Possibility of pregnancy: No    Precipitating factors:   None     Therapies Tried and outcome: Cranberry juice prn (contraindicated in Coumadin patients)    C/o urinary incontinence (with coughing) and urinary urgency.  No excessive burning sensation.      She had sweats and felt clammy a few days prior (Wednesday) along with a cough.  Friday, urinary symptoms started.  Friday she then developed flank pain.   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   Sunday, she pretty much slept all day (from 2 am to 7 pm) as she was exhausted from the dance recital.     The urge to urinate seems better.  No longer with flank pain or fever.  She denies any burning sensation currently.   She does state her urine has a strong odor.     She does continue to have a cough, and when she cough's  she'll have some incontinence, therefore she is using pads.           Problem list and histories reviewed & adjusted, as indicated.  Additional history: as documented    Patient Active Problem List   Diagnosis     Cholelithiasis without cholecystitis     Epigastric pain     Abdominal pain     CARDIOVASCULAR SCREENING; LDL GOAL LESS THAN 160     Past Surgical History:   Procedure Laterality Date     CHOLECYSTECTOMY, LAPOROSCOPIC  6-1-09     SURGICAL HISTORY OF -   1/7/09    vaginal birth     SURGICAL HISTORY OF -   3/1/08    Kidney stone removed       Social History   Substance Use Topics     Smoking status: Never Smoker     Smokeless tobacco: Never Used     Alcohol use No     Family History   Problem Relation Age of Onset     Breast Cancer Maternal Grandmother      age 70     CANCER Paternal Grandfather      age 40, brain cancer     Breast Cancer Mother          Current Outpatient Prescriptions   Medication Sig Dispense Refill     benzonatate (TESSALON) 200 MG capsule Take 1 capsule (200 mg) by mouth 3 times daily as needed for cough 21 capsule 0     sulfamethoxazole-trimethoprim (BACTRIM DS) 800-160 MG per tablet Take 1 tablet by mouth 2 times daily for 5 days 10 tablet 0     SUMAtriptan (IMITREX) 100 MG tablet TAKE ONE TAB BY MOUTH AT ONSET OF HEADACHE, MAY REPEAT ONCE IN 2 HOURS. MAX 200MG IN 24 HOURS  6     venlafaxine (EFFEXOR-XR) 150 MG 24 hr capsule TAKE 1 CAPSULE (150 MG TOTAL) BY MOUTH DAILY.  1     BP Readings from Last 3 Encounters:   05/21/18 110/70   04/02/18 112/78   03/12/18 115/70    Wt Readings from Last 3 Encounters:   05/21/18 245 lb 3.2 oz (111.2 kg)   04/02/18 241 lb (109.3 kg)   04/02/18 241 lb 6.4 oz (109.5 kg)                    Reviewed and updated as needed this visit by clinical staff       Reviewed and updated as needed this visit by Provider         ROS:  Constitutional, HEENT, cardiovascular, pulmonary, GI, , musculoskeletal, neuro, skin, endocrine and psych systems are negative,  "except as otherwise noted.    OBJECTIVE:     /70  Temp 99.4  F (37.4  C) (Tympanic)  Ht 5' 6\" (1.676 m)  Wt 245 lb 3.2 oz (111.2 kg)  LMP 04/20/2018 (Approximate)  Breastfeeding? No  BMI 39.58 kg/m2  Body mass index is 39.58 kg/(m^2).  GENERAL: Fatigued, strong urine odor noted.  EYES: Eyes grossly normal to inspection, PERRL and conjunctivae and sclerae normal  HENT: ear canals and TM's normal, nose and mouth without ulcers or lesions  NECK: no adenopathy, no asymmetry, masses, or scars and thyroid normal to palpation  RESP: lungs with a few rhonchi, cleared with cough,  - no rales, wheezes, no labored breathing.  Deep breaths trigger cough.   CV: regular rate and rhythm, normal S1 S2, no S3 or S4, no murmur, click or rub, no peripheral edema and peripheral pulses strong  ABDOMEN: soft, nontender, no hepatosplenomegaly, no masses and bowel sounds normal  MS: no gross musculoskeletal defects noted, no edema    Diagnostic Test Results:  Results for orders placed or performed in visit on 05/21/18 (from the past 24 hour(s))   UA reflex to Microscopic and Culture   Result Value Ref Range    Color Urine Yellow     Appearance Urine Clear     Glucose Urine Negative NEG^Negative mg/dL    Bilirubin Urine Small (A) NEG^Negative    Ketones Urine Trace (A) NEG^Negative mg/dL    Specific Gravity Urine >1.030 1.003 - 1.035    Blood Urine Negative NEG^Negative    pH Urine 5.5 5.0 - 7.0 pH    Protein Albumin Urine 30 (A) NEG^Negative mg/dL    Urobilinogen Urine 1.0 0.2 - 1.0 EU/dL    Nitrite Urine Negative NEG^Negative    Leukocyte Esterase Urine Negative NEG^Negative    Source Midstream Urine    Urine Microscopic   Result Value Ref Range    WBC Urine 0 - 5 OTO5^0 - 5 /HPF    RBC Urine O - 2 OTO2^O - 2 /HPF    Squamous Epithelial /LPF Urine Few FEW^Few /LPF    Bacteria Urine Few (A) NEG^Negative /HPF    Mucous Urine Present (A) NEG^Negative /LPF       ASSESSMENT/PLAN:         1. Acute bronchitis, unspecified " organism  BRONCHITIS    I discussed the pathophysiology of bronchitis and likely viral etiology.  I reviewed the risks and benefits of various over the counter and prescription medications.  Additionally, we reviewed the infectious nature of this condition and techniques to minimize transmission and future infections.    Medication ordered, see .  Will try to control the cough, as this is what is leading to her incontinence.  I suggest changing out feminine pads on a regular basis to prevent any type of urinary infection from worsening.     Patient advised to follow up if symptoms worsen or fail to improve as anticipated.         - benzonatate (TESSALON) 200 MG capsule; Take 1 capsule (200 mg) by mouth 3 times daily as needed for cough  Dispense: 21 capsule; Refill: 0    2. Dysuria  UA clear today, no signs of a UTI or pyelonephritis on exam today.  Fever and cough, seem more related to a viral URI/bronchitis process, which we will treat with cough medication.     I suggest pushing the fluids today and if urinary symptoms do not continue to improve as anticipated, she will begin bactrim.    - UA reflex to Microscopic and Culture  - Urine Microscopic  - benzonatate (TESSALON) 200 MG capsule; Take 1 capsule (200 mg) by mouth 3 times daily as needed for cough  Dispense: 21 capsule; Refill: 0  - sulfamethoxazole-trimethoprim (BACTRIM DS) 800-160 MG per tablet; Take 1 tablet by mouth 2 times daily for 5 days  Dispense: 10 tablet; Refill: 0    FUTURE APPOINTMENTS:       - Follow-up visit if symptoms worsen or fail to improve as anticipated.     Yulissa Badillo PA-C  [facility]

## 2018-05-21 NOTE — PATIENT INSTRUCTIONS
Push fluids.    Start the antibiotic (Bactrim) if your symptoms do not improve over the next 24 hours.  Watch for fever, continued back pain ,worsening urinary symptoms and if those occur, start the bactrim.

## 2018-05-21 NOTE — NURSING NOTE
"Initial /70  Temp 99.4  F (37.4  C) (Tympanic)  Ht 5' 6\" (1.676 m)  Wt 245 lb 3.2 oz (111.2 kg)  LMP 04/20/2018 (Approximate)  Breastfeeding? No  BMI 39.58 kg/m2 Estimated body mass index is 39.58 kg/(m^2) as calculated from the following:    Height as of this encounter: 5' 6\" (1.676 m).    Weight as of this encounter: 245 lb 3.2 oz (111.2 kg). .    Anika Bell MA    "

## 2018-05-21 NOTE — MR AVS SNAPSHOT
"              After Visit Summary   5/21/2018    Shannon Quiroz    MRN: [id number]           Patient Information     Date Of Birth          1980        Visit Information        Provider Department      5/21/2018 11:00 AM Yulissa Badillo PA-C [facility]        Today's Diagnoses     Urinary tract infection    -  1    Acute bronchitis, unspecified organism        Dysuria          Care Instructions    Push fluids.    Start the antibiotic (Bactrim) if your symptoms do not improve over the next 24 hours.  Watch for fever, continued back pain ,worsening urinary symptoms and if those occur, start the bactrim.           Follow-ups after your visit        Who to contact     Normal or non-critical lab and imaging results will be communicated to you by [employer], letter or phone within 4 business days after the clinic has received the results. If you do not hear from us within 7 days, please contact the clinic through [employer] or phone. If you have a critical or abnormal lab result, we will notify you by phone as soon as possible.  Submit refill requests through [employer] or call your pharmacy and they will forward the refill request to us. Please allow 3 business days for your refill to be completed.          If you need to speak with a  for additional information , please call: [phone]           Additional Information About Your Visit        [employer] Information     [employer] lets you send messages to your doctor, view your test results, renew your prescriptions, schedule appointments and more. To sign up, go to [email or web address] . Click on \"Log in\" on the left side of the screen, which will take you to the Welcome page. Then click on \"Sign up Now\" on the right side of the page.     You will be asked to enter the access code listed below, as well as some personal information. Please follow the directions to create your username and password.     Your access code is: " [email or web address]  Expires: 6/10/2018 10:10 AM     Your access code will  in 90 days. If you need help or a new code, please call your [facility] clinic or [phone].        Care EveryWhere ID     This is your Care EveryWhere ID. This could be used by other organizations to access your [facility] medical records  [id number]        Your Vitals Were     Temperature Height Last Period Breastfeeding? BMI (Body Mass Index)       99.4  F (37.4  C) (Tympanic) 5' 6\" (1.676 m) 2018 (Approximate) No 39.58 kg/m2        Blood Pressure from Last 3 Encounters:   18 110/70   18 112/78   18 115/70    Weight from Last 3 Encounters:   18 245 lb 3.2 oz (111.2 kg)   18 241 lb (109.3 kg)   18 241 lb 6.4 oz (109.5 kg)              We Performed the Following     UA reflex to Microscopic and Culture     Urine Microscopic          Today's Medication Changes          These changes are accurate as of 18 11:35 AM.  If you have any questions, ask your nurse or doctor.               Start taking these medicines.        Dose/Directions    benzonatate 200 MG capsule   Commonly known as:  TESSALON   Used for:  Acute bronchitis, unspecified organism, Dysuria   Started by:  Yulissa Badillo PA-C        Dose:  200 mg   Take 1 capsule (200 mg) by mouth 3 times daily as needed for cough   Quantity:  21 capsule   Refills:  0       sulfamethoxazole-trimethoprim 800-160 MG per tablet   Commonly known as:  BACTRIM DS   Used for:  Dysuria   Started by:  Yulissa Badillo PA-C        Dose:  1 tablet   Take 1 tablet by mouth 2 times daily for 5 days   Quantity:  10 tablet   Refills:  0            Where to get your medicines      These medications were sent to [facility] [address]  [address] [address]     Phone:  [phone]     benzonatate 200 MG capsule    sulfamethoxazole-trimethoprim 800-160 MG per tablet                Primary Care " Provider Office Phone # Fax #    Evan Fernandez [phone] [address] [place] [address]        Equal Access to Services     CHAZ ALONZO : Hadmainor daily cisneros adwoa Rawls, wachengda luqadaha, qaybta kaalmada tim, kevon frost laanyashruthi deluna. So [facility] [phone].    ATENCIÓN: Si habla español, tiene a alcocer disposición servicios gratuitos de asistencia lingüística. Llame al [phone].    We comply with applicable federal civil rights laws and [place] laws. We do not discriminate on the basis of race, color, national origin, age, disability, sex, sexual orientation, or gender identity.            Thank you!     Thank you for choosing [facility]  for your care. Our goal is always to provide you with excellent care. Hearing back from our patients is one way we can continue to improve our services. Please take a few minutes to complete the written survey that you may receive in the mail after your visit with us. Thank you!             Your Updated Medication List - Protect others around you: Learn how to safely use, store and throw away your medicines at www.disposemymeds.org.          This list is accurate as of 5/21/18 11:35 AM.  Always use your most recent med list.                   Brand Name Dispense Instructions for use Diagnosis    benzonatate 200 MG capsule    TESSALON    21 capsule    Take 1 capsule (200 mg) by mouth 3 times daily as needed for cough    Acute bronchitis, unspecified organism, Dysuria       sulfamethoxazole-trimethoprim 800-160 MG per tablet    BACTRIM DS    10 tablet    Take 1 tablet by mouth 2 times daily for 5 days    Dysuria       SUMAtriptan 100 MG tablet    IMITREX     TAKE ONE TAB BY MOUTH AT ONSET OF HEADACHE, MAY REPEAT ONCE IN 2 HOURS. MAX 200MG IN 24 HOURS        venlafaxine 150 MG 24 hr capsule    EFFEXOR-XR     TAKE 1 CAPSULE (150 MG TOTAL) BY MOUTH DAILY.

## 2018-06-06 ENCOUNTER — COMMUNICATION - HEALTHEAST (OUTPATIENT)
Dept: FAMILY MEDICINE | Facility: CLINIC | Age: 38
End: 2018-06-06

## 2018-06-06 DIAGNOSIS — F41.9 ANXIETY: ICD-10-CM

## 2018-06-11 ENCOUNTER — OFFICE VISIT (OUTPATIENT)
Dept: FAMILY MEDICINE | Facility: CLINIC | Age: 38
End: 2018-06-11
Payer: COMMERCIAL

## 2018-06-11 VITALS
TEMPERATURE: 98.4 F | DIASTOLIC BLOOD PRESSURE: 100 MMHG | WEIGHT: 248 LBS | SYSTOLIC BLOOD PRESSURE: 138 MMHG | HEART RATE: 80 BPM | BODY MASS INDEX: 39.86 KG/M2 | HEIGHT: 66 IN

## 2018-06-11 DIAGNOSIS — F32.5 MAJOR DEPRESSION IN COMPLETE REMISSION (H): ICD-10-CM

## 2018-06-11 DIAGNOSIS — G47.19 EXCESSIVE DAYTIME SLEEPINESS: ICD-10-CM

## 2018-06-11 DIAGNOSIS — R07.0 THROAT PAIN: ICD-10-CM

## 2018-06-11 DIAGNOSIS — R63.5 WEIGHT GAIN: Primary | ICD-10-CM

## 2018-06-11 DIAGNOSIS — E66.01 MORBID OBESITY (H): ICD-10-CM

## 2018-06-11 PROCEDURE — 99214 OFFICE O/P EST MOD 30 MIN: CPT | Performed by: FAMILY MEDICINE

## 2018-06-11 RX ORDER — VENLAFAXINE HYDROCHLORIDE 37.5 MG/1
CAPSULE, EXTENDED RELEASE ORAL
Qty: 49 CAPSULE | Refills: 0 | Status: SHIPPED | OUTPATIENT
Start: 2018-06-11 | End: 2018-07-26

## 2018-06-11 ASSESSMENT — ANXIETY QUESTIONNAIRES
7. FEELING AFRAID AS IF SOMETHING AWFUL MIGHT HAPPEN: NOT AT ALL
1. FEELING NERVOUS, ANXIOUS, OR ON EDGE: NOT AT ALL
5. BEING SO RESTLESS THAT IT IS HARD TO SIT STILL: NOT AT ALL
2. NOT BEING ABLE TO STOP OR CONTROL WORRYING: NOT AT ALL
6. BECOMING EASILY ANNOYED OR IRRITABLE: NOT AT ALL
GAD7 TOTAL SCORE: 0
3. WORRYING TOO MUCH ABOUT DIFFERENT THINGS: NOT AT ALL

## 2018-06-11 ASSESSMENT — PATIENT HEALTH QUESTIONNAIRE - PHQ9: 5. POOR APPETITE OR OVEREATING: NOT AT ALL

## 2018-06-11 NOTE — LETTER
My Depression Action Plan  Name: Shannon Quiroz   Date of Birth 1980  Date: 6/11/2018    My doctor: Obey Jiang   My clinic: [facility] [address] [id number] [phone]          GREEN    ZONE   Good Control    What it looks like:     Things are going generally well. You have normal up s and down s. You may even feel depressed from time to time, but bad moods usually last less than a day.   What you need to do:  1. Continue to care for yourself (see self care plan)  2. Check your depression survival kit and update it as needed  3. Follow your physician s recommendations including any medication.  4. Do not stop taking medication unless you consult with your physician first.           YELLOW         ZONE Getting Worse    What it looks like:     Depression is starting to interfere with your life.     It may be hard to get out of bed; you may be starting to isolate yourself from others.    Symptoms of depression are starting to last most all day and this has happened for several days.     You may have suicidal thoughts but they are not constant.   What you need to do:     1. Call your care team, your response to treatment will improve if you keep your care team informed of your progress. Yellow periods are signs an adjustment may need to be made.     2. Continue your self-care, even if you have to fake it!    3. Talk to someone in your support network    4. Open up your depression survival kit           RED    ZONE Medical Alert - Get Help    What it looks like:     Depression is seriously interfering with your life.     You may experience these or other symptoms: You can t get out of bed most days, can t work or engage in other necessary activities, you have trouble taking care of basic hygiene, or basic responsibilities, thoughts of suicide or death that will not go away, self-injurious behavior.     What you need to do:  1. Call your care  team and request a same-day appointment. If they are not available (weekends or after hours) call your local crisis line, emergency room or 911.            Depression Self Care Plan / Survival Kit    Self-Care for Depression  Here s the deal. Your body and mind are really not as separate as most people think.  What you do and think affects how you feel and how you feel influences what you do and think. This means if you do things that people who feel good do, it will help you feel better.  Sometimes this is all it takes.  There is also a place for medication and therapy depending on how severe your depression is, so be sure to consult with your medical provider and/ or Behavioral Health Consultant if your symptoms are worsening or not improving.     In order to better manage my stress, I will:    Exercise  Get some form of exercise, every day. This will help reduce pain and release endorphins, the  feel good  chemicals in your brain. This is almost as good as taking antidepressants!  This is not the same as joining a gym and then never going! (they count on that by the way ) It can be as simple as just going for a walk or doing some gardening, anything that will get you moving.      Hygiene   Maintain good hygiene (Get out of bed in the morning, Make your bed, Brush your teeth, Take a shower, and Get dressed like you were going to work, even if you are unemployed).  If your clothes don't fit try to get ones that do.    Diet  I will strive to eat foods that are good for me, drink plenty of water, and avoid excessive sugar, caffeine, alcohol, and other mood-altering substances.  Some foods that are helpful in depression are: complex carbohydrates, B vitamins, flaxseed, fish or fish oil, fresh fruits and vegetables.    Psychotherapy  I agree to participate in Individual Therapy (if recommended).    Medication  If prescribed medications, I agree to take them.  Missing doses can result in serious side effects.  I  understand that drinking alcohol, or other illicit drug use, may cause potential side effects.  I will not stop my medication abruptly without first discussing it with my provider.    Staying Connected With Others  I will stay in touch with my friends, family members, and my primary care provider/team.    Use your imagination  Be creative.  We all have a creative side; it doesn t matter if it s oil painting, sand castles, or mud pies! This will also kick up the endorphins.    Witness Beauty  (AKA stop and smell the roses) Take a look outside, even in mid-winter. Notice colors, textures. Watch the squirrels and birds.     Service to others  Be of service to others.  There is always someone else in need.  By helping others we can  get out of ourselves  and remember the really important things.  This also provides opportunities for practicing all the other parts of the program.    Humor  Laugh and be silly!  Adjust your TV habits for less news and crime-drama and more comedy.    Control your stress  Try breathing deep, massage therapy, biofeedback, and meditation. Find time to relax each day.     My support system    Clinic Contact:  Phone number:    Contact 1:  Phone number:    Contact 2:  Phone number:    Holiness/:  Phone number:    Therapist:  Phone number:    Local crisis center:    Phone number:    Other community support:  Phone number:

## 2018-06-11 NOTE — PROGRESS NOTES
"  SUBJECTIVE:   Shannon Quiroz is a 38 year old female who presents to clinic today for the following health issues:    Depression and Anxiety Follow-Up    Status since last visit: Improved, would like to wean down/off medication    Other associated symptoms:None    Complicating factors:     Significant life event: No     Current substance abuse: None    PHQ-9 6/11/2018   Total Score 10   Q9: Suicide Ideation Not at all     RAPHAEL-7 SCORE 6/11/2018   Total Score 0     In the past two weeks have you had thoughts of suicide or self-harm?  No.    Do you have concerns about your personal safety or the safety of others?   No  PHQ-9  English  PHQ-9   Any Language  RAPHAEL-7  Suicide Assessment Five-step Evaluation and Treatment (SAFE-T)    Feels symptoms date back to number of years ago with unexpected pregnancy that cause physical problems like a back issue.  Within the first year was having emotional issue.  Started having anxiety about having 3 kids.  Would feel panicky and developed rashes.  Had trouble getting out of bed.  Was \"not herself\".  Was seen through [facility] in [place] and diagnosed with postpartum depression.   Was started on venlafaxine and feels the 150mg dose has been effective.    Feels she has gained a \"ton of weight\" since being on medication.    Feels she is too relaxed about things.  Has been too lax about her weight.  Not fit any longer and felt she always has been in the past.  Has \"let things go\".  Has stopped exercising and taking care of herself.  Not sure if it is time related to lack of caring/interest.    Does find she is doing more emotional eating.  Feels like she eats when she knows she should not.  Does not feel like she is able to stop herself when she gets this way.  Does sometimes hide her eating and binge    Not sure if medication is contributing to this.  Mom wonders if perhaps she should wean off.    Has been able to lose up to 50# in the past but \"cannot get in the mind set\".   Does " "sometimes hide eating, eats after family is in bed.    *  Fatigue is an issue as well.  Would never take naps in the past.  Now feels like she could \"sleep all day\" if uninterrupted\".  Has trouble waking in the morning.  If she is sitting in a car she will get sleepy.  Can happen as a passenger or .  Has to pull over.  Snoring is a big issue,  cannot sleep in the same bed with her any longer.  Feels this has also been an issue since starting the medication.  Wakes with dry mouth and occasional AM HA.    *  Feels she is hot and sweats all the time, wonders if it is medication related.    *  Had a bad cough and UTI in May.  Since then has had R sided throat pain.  Feels she had a particularly bad coughing fit and felt \"like her throat ripped\".  Feels worse if she is talking a lot or raising her voice. Has been not quite a month since this started.            Amount of exercise or physical activity: None    Problems taking medications regularly: No    Medication side effects: none    Diet: regular (no restrictions)    Problem list and histories reviewed & adjusted, as indicated.  Additional history: as documented    Reviewed and updated as needed this visit by clinical staff  Tobacco  Allergies  Meds  Med Hx  Surg Hx  Fam Hx  Soc Hx      Reviewed and updated as needed this visit by Provider  Tobacco  Med Hx  Surg Hx  Fam Hx  Soc Hx        ROS: Remainder of Constitutional, CV, Respiratory, GI,  negative with exception of that mentioned above    PE:  VS as above   Gen:  WN/WD/WH female in NAD   HEENT:  NC/AT, conjunctiva wnl, TM's wnl gaby pearly gray with good light reflex, oropharynx clear without  exudate/erythema   Neck:  supple, no LAD appreciated   Heart:  RRR without murmur, nl S1, S2, no rubs or gallops   Lungs CTA gaby without rales/ronchi/wheezes   Psych: Alert and oriented times 3; coherent speech, normal   rate and volume, able to articulate logical thoughts, able   to abstract reason, " no tangential thoughts, no hallucinations   or delusions  Her affect is bright and appropriate    A?P:      ICD-10-CM    1. Weight gain R63.5 MENTAL HEALTH REFERRAL  - Adult; Outpatient Treatment; Individual/Couples/Family/Group Therapy/Health Psychology; [facility]: [facility] [phone]; We will contact you to schedule the appointment or please call with any questions   2. Excessive daytime sleepiness G47.19 SLEEP EVALUATION & MANAGEMENT REFERRAL - ADULT -[facility]  [phone] (Age 2 and up)   3. Major depression in complete remission (H) F32.5 venlafaxine (EFFEXOR-XR) 37.5 MG 24 hr capsule     MENTAL HEALTH REFERRAL  - Adult; Outpatient Treatment; Individual/Couples/Family/Group Therapy/Health Psychology; [facility]: [facility] [phone]; We will contact you to schedule the appointment or please call with any questions   4. Throat pain R07.0    5. Morbid obesity (H) E66.01      Patient Instructions   We'll wean you off the venlafaxine.  Please follow the instructions on the bottle to wean.  Let me know if you have any problems coming off of the medication.    I do think counseling would be a good option if you can swing it.  You'll get a call to schedule.  Feel free to say no if it does not work.    I did place the sleep referral.  Please schedule for an evaluation for sleep apnea    Let me know if the throat does not improve over the next 2-3 weeks.    We can follow up through [facility] if all is going well with an update.    Reviewed with pt that I am concerned sleep apnea is behind her daytime sleepiness.  Might be related to the elevated BP today as well.  Recommended eval ASAP.  Reviewed need to pull over if sleepy while driving which she does.    Compulsive eating and lack of caring possible SNRI side effects.  She feels mood is good, PHQ9 related to weight and fatigue.  Plan weaning, consider alternate med if needed.

## 2018-06-11 NOTE — NURSING NOTE
"Initial BP (!) 132/98  Pulse 80  Temp 98.4  F (36.9  C) (Tympanic)  Ht 5' 6\" (1.676 m)  Wt 248 lb (112.5 kg)  Breastfeeding? No  BMI 40.03 kg/m2 Estimated body mass index is 40.03 kg/(m^2) as calculated from the following:    Height as of this encounter: 5' 6\" (1.676 m).    Weight as of this encounter: 248 lb (112.5 kg). .      "

## 2018-06-11 NOTE — PATIENT INSTRUCTIONS
We'll wean you off the venlafaxine.  Please follow the instructions on the bottle to wean.  Let me know if you have any problems coming off of the medication.    I do think counseling would be a good option if you can swing it.  You'll get a call to schedule.  Feel free to say no if it does not work.    I did place the sleep referral.  Please schedule for an evaluation for sleep apnea    Let me know if the throat does not improve over the next 2-3 weeks.    We can follow up through [facility] if all is going well with an update.

## 2018-06-11 NOTE — MR AVS SNAPSHOT
After Visit Summary   6/11/2018    Shannon Quiroz    MRN: [id number]           Patient Information     Date Of Birth          1980        Visit Information        Provider Department      6/11/2018 9:40 AM Jennifer Maldonado,  [facility]        Today's Diagnoses     Major depression in complete remission (H)    -  1    Weight gain        Excessive daytime sleepiness          Care Instructions    We'll wean you off the venlafaxine.  Please follow the instructions on the bottle to wean.  Let me know if you have any problems coming off of the medication.    I do think counseling would be a good option if you can swing it.  You'll get a call to schedule.  Feel free to say no if it does not work.    I did place the sleep referral.  Please schedule for an evaluation for sleep apnea    Let me know if the throat does not improve over the next 2-3 weeks.    We can follow up through [facility] if all is going well with an update.          Follow-ups after your visit        Additional Services     MENTAL HEALTH REFERRAL  - Adult; Outpatient Treatment; Individual/Couples/Family/Group Therapy/Health Psychology; [facility]: [facility] [phone]; We will contact you to schedule the appointment or please call with any questions       All scheduling is subject to the client's specific insurance plan & benefits, provider/location availability, and provider clinical specialities.  Please arrive 15 minutes early for your first appointment and bring your completed paperwork.    Please be aware that coverage of these services is subject to the terms and limitations of your health insurance plan.  Call member services at your health plan with any benefit or coverage questions.                      SLEEP EVALUATION & MANAGEMENT REFERRAL - ADULT -[facility]  [phone] (Age 2 and up)       Please be aware that coverage of these services is subject to the terms and  "limitations of your health insurance plan.  Call member services at your health plan with any benefit or coverage questions.      Please bring the following to your appointment:    >>   List of current medications   >>   This referral request   >>   Any documents/labs given to you for this referral                      Future tests that were ordered for you today     Open Future Orders        Priority Expected Expires Ordered    SLEEP EVALUATION & MANAGEMENT REFERRAL - ADULT -[facility]  [phone] (Age 2 and up) Routine  2019            Who to contact     Normal or non-critical lab and imaging results will be communicated to you by [employer], letter or phone within 4 business days after the clinic has received the results. If you do not hear from us within 7 days, please contact the clinic through [employer] or phone. If you have a critical or abnormal lab result, we will notify you by phone as soon as possible.  Submit refill requests through [employer] or call your pharmacy and they will forward the refill request to us. Please allow 3 business days for your refill to be completed.          If you need to speak with a  for additional information , please call: [phone]           Additional Information About Your Visit        [employer] Information     [employer] lets you send messages to your doctor, view your test results, renew your prescriptions, schedule appointments and more. To sign up, go to [email or web address] . Click on \"Log in\" on the left side of the screen, which will take you to the Welcome page. Then click on \"Sign up Now\" on the right side of the page.     You will be asked to enter the access code listed below, as well as some personal information. Please follow the directions to create your username and password.     Your access code is: [email or web address]  Expires: 2018  9:42 AM     Your access code will  in 90 days. If you need help or a new " "code, please call your [facility] clinic or [phone].        Care EveryWhere ID     This is your Care EveryWhere ID. This could be used by other organizations to access your [facility] medical records  [id number]        Your Vitals Were     Pulse Temperature Height Breastfeeding? BMI (Body Mass Index)       80 98.4  F (36.9  C) (Tympanic) 5' 6\" (1.676 m) No 40.03 kg/m2        Blood Pressure from Last 3 Encounters:   06/11/18 (!) 132/98   05/21/18 110/70   04/02/18 112/78    Weight from Last 3 Encounters:   06/11/18 248 lb (112.5 kg)   05/21/18 245 lb 3.2 oz (111.2 kg)   04/02/18 241 lb (109.3 kg)              We Performed the Following     MENTAL HEALTH REFERRAL  - Adult; Outpatient Treatment; Individual/Couples/Family/Group Therapy/Health Psychology; [facility]: [facility] [phone]; We will contact you to schedule the appointment or please call with any questions          Today's Medication Changes          These changes are accurate as of 6/11/18 10:42 AM.  If you have any questions, ask your nurse or doctor.               Start taking these medicines.        Dose/Directions    venlafaxine 37.5 MG 24 hr capsule   Commonly known as:  EFFEXOR-XR   Used for:  Major depression in complete remission (H)   Started by:  Jennifer Maldonado, DO        Take 2 capsules daily for 2 weeks then take 1 capsule daily for 2 weeks then 1 capsule every other day until gone   Quantity:  49 capsule   Refills:  0            Where to get your medicines      These medications were sent to [facility] [address]  [address] [facility] [address]     Phone:  [phone]     venlafaxine 37.5 MG 24 hr capsule                Primary Care Provider Office Phone # Fax #    [facility] [phone] [address] [facility] [address]        Equal Access to Services     CHAZ ALONZO AH: song Sanchez, qaybta " kevon israelmore deluna. So [facility] [phone].    ATENCIÓN: Si peyton montoya, tiene a alcocer disposición servicios gratuitos de asistencia lingüística. Francisca al [phone].    We comply with applicable federal civil rights laws and [place] laws. We do not discriminate on the basis of race, color, national origin, age, disability, sex, sexual orientation, or gender identity.            Thank you!     Thank you for choosing [facility]  for your care. Our goal is always to provide you with excellent care. Hearing back from our patients is one way we can continue to improve our services. Please take a few minutes to complete the written survey that you may receive in the mail after your visit with us. Thank you!             Your Updated Medication List - Protect others around you: Learn how to safely use, store and throw away your medicines at www.disposemymeds.org.          This list is accurate as of 6/11/18 10:42 AM.  Always use your most recent med list.                   Brand Name Dispense Instructions for use Diagnosis    SUMAtriptan 100 MG tablet    IMITREX     TAKE ONE TAB BY MOUTH AT ONSET OF HEADACHE, MAY REPEAT ONCE IN 2 HOURS. MAX 200MG IN 24 HOURS        venlafaxine 37.5 MG 24 hr capsule    EFFEXOR-XR    49 capsule    Take 2 capsules daily for 2 weeks then take 1 capsule daily for 2 weeks then 1 capsule every other day until gone    Major depression in complete remission (H)

## 2018-06-12 ASSESSMENT — PATIENT HEALTH QUESTIONNAIRE - PHQ9: SUM OF ALL RESPONSES TO PHQ QUESTIONS 1-9: 10

## 2018-06-12 ASSESSMENT — ANXIETY QUESTIONNAIRES: GAD7 TOTAL SCORE: 0

## 2018-06-20 PROBLEM — R07.0 THROAT PAIN: Status: ACTIVE | Noted: 2018-06-20

## 2018-06-20 PROBLEM — F32.5 MAJOR DEPRESSION IN COMPLETE REMISSION (H): Status: ACTIVE | Noted: 2018-06-20

## 2018-06-20 PROBLEM — E66.01 MORBID OBESITY (H): Status: ACTIVE | Noted: 2018-06-20

## 2018-06-20 PROBLEM — G47.19 EXCESSIVE DAYTIME SLEEPINESS: Status: ACTIVE | Noted: 2018-06-20

## 2018-06-20 PROBLEM — R63.5 WEIGHT GAIN: Status: ACTIVE | Noted: 2018-06-20

## 2018-07-26 ENCOUNTER — OFFICE VISIT (OUTPATIENT)
Dept: FAMILY MEDICINE | Facility: CLINIC | Age: 38
End: 2018-07-26
Payer: COMMERCIAL

## 2018-07-26 VITALS
WEIGHT: 254.4 LBS | TEMPERATURE: 98.4 F | HEART RATE: 92 BPM | HEIGHT: 66 IN | SYSTOLIC BLOOD PRESSURE: 128 MMHG | DIASTOLIC BLOOD PRESSURE: 74 MMHG | BODY MASS INDEX: 40.88 KG/M2

## 2018-07-26 DIAGNOSIS — G47.19 EXCESSIVE DAYTIME SLEEPINESS: ICD-10-CM

## 2018-07-26 DIAGNOSIS — F33.0 MAJOR DEPRESSIVE DISORDER, RECURRENT EPISODE, MILD (H): Primary | ICD-10-CM

## 2018-07-26 PROCEDURE — 99214 OFFICE O/P EST MOD 30 MIN: CPT | Performed by: FAMILY MEDICINE

## 2018-07-26 RX ORDER — VENLAFAXINE HYDROCHLORIDE 37.5 MG/1
37.5 CAPSULE, EXTENDED RELEASE ORAL DAILY
Qty: 30 CAPSULE | Refills: 1 | Status: SHIPPED | OUTPATIENT
Start: 2018-07-26 | End: 2018-09-21

## 2018-07-26 NOTE — PATIENT INSTRUCTIONS
We'll plan on going back to the 37.5mg dose daily for now.  Let me know in 3-4 weeks what you think and we can stay at this dose, go higher or consider an alternative.

## 2018-07-26 NOTE — MR AVS SNAPSHOT
"              After Visit Summary   7/26/2018    Shannon Quiroz    MRN: [id number]           Patient Information     Date Of Birth          1980        Visit Information        Provider Department      7/26/2018 11:40 AM Jennifer Maldonado, DO [facility]        Today's Diagnoses     Major depression in complete remission (H)          Care Instructions    We'll plan on going back to the 37.5mg dose daily for now.  Let me know in 3-4 weeks what you think and we can stay at this dose, go higher or consider an alternative.          Follow-ups after your visit        Who to contact     Normal or non-critical lab and imaging results will be communicated to you by [employer], letter or phone within 4 business days after the clinic has received the results. If you do not hear from us within 7 days, please contact the clinic through "[employer]t or phone. If you have a critical or abnormal lab result, we will notify you by phone as soon as possible.  Submit refill requests through [employer] or call your pharmacy and they will forward the refill request to us. Please allow 3 business days for your refill to be completed.          If you need to speak with a  for additional information , please call: [phone]           Additional Information About Your Visit        [employer] Information     [employer] gives you secure access to your electronic health record. If you see a primary care provider, you can also send messages to your care team and make appointments. If you have questions, please call your primary care clinic.  If you do not have a primary care provider, please call [phone] and they will assist you.        Care EveryWhere ID     This is your Care EveryWhere ID. This could be used by other organizations to access your [facility] medical records  [id number]        Your Vitals Were     Pulse Temperature Height Breastfeeding? BMI (Body Mass Index)       92 98.4  F (36.9  C) (Tympanic) 5' 6\" " (1.676 m) No 41.06 kg/m2        Blood Pressure from Last 3 Encounters:   07/26/18 128/74   06/11/18 (!) 138/100   05/21/18 110/70    Weight from Last 3 Encounters:   07/26/18 254 lb 6.4 oz (115.4 kg)   06/11/18 248 lb (112.5 kg)   05/21/18 245 lb 3.2 oz (111.2 kg)              Today, you had the following     No orders found for display         Today's Medication Changes          These changes are accurate as of 7/26/18 12:28 PM.  If you have any questions, ask your nurse or doctor.               These medicines have changed or have updated prescriptions.        Dose/Directions    venlafaxine 37.5 MG 24 hr capsule   Commonly known as:  EFFEXOR-XR   This may have changed:    - how much to take  - how to take this  - when to take this  - additional instructions   Used for:  Major depression in complete remission (H)   Changed by:  Jennifer Maldonado DO        Dose:  37.5 mg   Take 1 capsule (37.5 mg) by mouth daily   Quantity:  30 capsule   Refills:  1            Where to get your medicines      These medications were sent to [facility] [address]  [address] [place] [address]     Phone:  [phone]     venlafaxine 37.5 MG 24 hr capsule                Primary Care Provider Office Phone # Fax #    [facility] [phone] [address] [facility] [address]        Equal Access to Services     CHAZ ALONZO AH: Hadii daily ku hadasho Soomaali, waaxda luqadaha, qaybta kaalmada adeegyada, waxay kae deluna. So [facility] [phone].    ATENCIÓN: Si habla español, tiene a alcocer disposición servicios gratuitos de asistencia lingüística. Llame al [phone].    We comply with applicable federal civil rights laws and [place] laws. We do not discriminate on the basis of race, color, national origin, age, disability, sex, sexual orientation, or gender identity.            Thank you!     Thank you for choosing [place]  [facility]  for your care. Our goal is always to provide you with excellent care. Hearing back from our patients is one way we can continue to improve our services. Please take a few minutes to complete the written survey that you may receive in the mail after your visit with us. Thank you!             Your Updated Medication List - Protect others around you: Learn how to safely use, store and throw away your medicines at www.disposemymeds.org.          This list is accurate as of 7/26/18 12:28 PM.  Always use your most recent med list.                   Brand Name Dispense Instructions for use Diagnosis    SUMAtriptan 100 MG tablet    IMITREX     TAKE ONE TAB BY MOUTH AT ONSET OF HEADACHE, MAY REPEAT ONCE IN 2 HOURS. MAX 200MG IN 24 HOURS        venlafaxine 37.5 MG 24 hr capsule    EFFEXOR-XR    30 capsule    Take 1 capsule (37.5 mg) by mouth daily    Major depression in complete remission (H)

## 2018-07-26 NOTE — PROGRESS NOTES
"  SUBJECTIVE:   Shannon Quiroz is a 38 year old female who presents to clinic today for the following health issues:    Depression Followup    Status since last visit: Worsened for the last 3 weeks - weaned off of Effexor, just took last pill last night    See PHQ-9 for current symptoms.  Other associated symptoms: None    Complicating factors: 2  Significant life event:  No   Current substance abuse:  None  Anxiety or Panic symptoms:  No    PHQ-9 6/11/2018 7/26/2018   Total Score 10 9   Q9: Suicide Ideation Not at all Not at all     In the past two weeks have you had thoughts of suicide or self-harm?  No.    Do you have concerns about your personal safety or the safety of others?   No  PHQ-9  English  PHQ-9   Any Language  Suicide Assessment Five-step Evaluation and Treatment (SAFE-T)    Amount of exercise or physical activity: None    Problems taking medications regularly: No    Medication side effects: none    Diet: regular (no restrictions)      Had been working on weaning off effexor due to concern it was causing decreased motivation and interested.  Felt like she \"didn't care\" about anything.  When she decreased dose from 75mg to 37.5mg she felt more energy and motivation.  Felt things were really going well.    When she began the 37.5mg every other day she became irritable and would \"snap at the drop of a hat\".  Has been pulled side by both mom and .  Seems to be blowing up at her kids for no reason.    When med was first started she was scratching herself a lot, scratching herself open.  Was told it was part of her anxiety.  3-4 times now she has caught herself itching again but has not gotten to the point where it was.    Worried about the irritability and scratching.  Does not want to get back to where she was     *  Has not yet scheduled sleep eval, wanted to try to get her weight under control first.  Insurance will not cover eating disorder clinic or counseling.        Problem list and histories " reviewed & adjusted, as indicated.  Additional history: as documented    Reviewed and updated as needed this visit by clinical staff  Tobacco  Allergies  Meds  Med Hx  Surg Hx  Fam Hx  Soc Hx      Reviewed and updated as needed this visit by Provider  Tobacco  Med Hx  Surg Hx  Fam Hx  Soc Hx        ROS: Remainder of Constitutional, CV, Respiratory, GI,  negative with exception of that mentioned above    PE:  VS as above   Gen:  WN/WD/WH female in NAD   Psych: Alert and oriented times 3; coherent speech, normal   rate and volume, able to articulate logical thoughts, able   to abstract reason, no tangential thoughts, no hallucinations   or delusions  Her affect is mildly flat and anxious    A/P:      ICD-10-CM    1. Major depressive disorder, recurrent episode, mild (H) F33.0    2. Excessive daytime sleepiness G47.19        Patient Instructions   We'll plan on going back to the 37.5mg dose daily for now.  Let me know in 3-4 weeks what you think and we can stay at this dose, go higher or consider an alternative.    Reviewed options with pt.  At our last visit we discussed considering an alternate to the venlafaxine vs stopping.  Currently pt would prefer to just go back on med as she knows it was effective and is worried about trying anything else.  Plan to restart 37.5mg and monitor.    Reviewed again previous sleep referral.  Discussed how lack of sleep can drive mood and irritability issues.  Reviewed concern with drowsiness while driving (pt notes she pulls over frequently when tired).  Pt had bene planning on holding off, encouraged her strongly to schedule eval.  She will consider.    25 minutes of 25 minute visit spent reviewing mood concerns, options for management, sleep issues ain relation to mood and weight as well as personal safety as above

## 2018-07-26 NOTE — NURSING NOTE
"Initial /74  Pulse 92  Temp 98.4  F (36.9  C) (Tympanic)  Ht 5' 6\" (1.676 m)  Wt 254 lb 6.4 oz (115.4 kg)  Breastfeeding? No  BMI 41.06 kg/m2 Estimated body mass index is 41.06 kg/(m^2) as calculated from the following:    Height as of this encounter: 5' 6\" (1.676 m).    Weight as of this encounter: 254 lb 6.4 oz (115.4 kg). .      "

## 2018-07-27 ASSESSMENT — PATIENT HEALTH QUESTIONNAIRE - PHQ9: SUM OF ALL RESPONSES TO PHQ QUESTIONS 1-9: 9

## 2018-08-01 PROBLEM — F33.0 MAJOR DEPRESSIVE DISORDER, RECURRENT EPISODE, MILD (H): Status: ACTIVE | Noted: 2018-08-01

## 2018-08-30 ENCOUNTER — OFFICE VISIT (OUTPATIENT)
Dept: SLEEP MEDICINE | Facility: CLINIC | Age: 38
End: 2018-08-30
Attending: FAMILY MEDICINE
Payer: COMMERCIAL

## 2018-08-30 VITALS
HEIGHT: 66 IN | WEIGHT: 258 LBS | OXYGEN SATURATION: 98 % | SYSTOLIC BLOOD PRESSURE: 124 MMHG | HEART RATE: 70 BPM | BODY MASS INDEX: 41.46 KG/M2 | DIASTOLIC BLOOD PRESSURE: 89 MMHG

## 2018-08-30 DIAGNOSIS — R06.81 APNEA: ICD-10-CM

## 2018-08-30 DIAGNOSIS — R06.83 SNORING: ICD-10-CM

## 2018-08-30 DIAGNOSIS — G47.19 EXCESSIVE DAYTIME SLEEPINESS: ICD-10-CM

## 2018-08-30 DIAGNOSIS — G25.81 RESTLESS LEGS SYNDROME (RLS): Primary | ICD-10-CM

## 2018-08-30 DIAGNOSIS — D50.9 IRON DEFICIENCY ANEMIA, UNSPECIFIED IRON DEFICIENCY ANEMIA TYPE: ICD-10-CM

## 2018-08-30 LAB
ERYTHROCYTE [DISTWIDTH] IN BLOOD BY AUTOMATED COUNT: 15.7 % (ref 10–15)
FERRITIN SERPL-MCNC: 7 NG/ML (ref 12–150)
HCT VFR BLD AUTO: 36.8 % (ref 35–47)
HGB BLD-MCNC: 11.1 G/DL (ref 11.7–15.7)
IRON SATN MFR SERPL: 7 % (ref 15–46)
IRON SERPL-MCNC: 37 UG/DL (ref 35–180)
MCH RBC QN AUTO: 22.2 PG (ref 26.5–33)
MCHC RBC AUTO-ENTMCNC: 30.2 G/DL (ref 31.5–36.5)
MCV RBC AUTO: 74 FL (ref 78–100)
PLATELET # BLD AUTO: 218 10E9/L (ref 150–450)
RBC # BLD AUTO: 4.99 10E12/L (ref 3.8–5.2)
TIBC SERPL-MCNC: 506 UG/DL (ref 240–430)
WBC # BLD AUTO: 5.5 10E9/L (ref 4–11)

## 2018-08-30 PROCEDURE — 82728 ASSAY OF FERRITIN: CPT | Performed by: FAMILY MEDICINE

## 2018-08-30 PROCEDURE — 83550 IRON BINDING TEST: CPT | Performed by: FAMILY MEDICINE

## 2018-08-30 PROCEDURE — 83540 ASSAY OF IRON: CPT | Performed by: FAMILY MEDICINE

## 2018-08-30 PROCEDURE — 99244 OFF/OP CNSLTJ NEW/EST MOD 40: CPT | Performed by: FAMILY MEDICINE

## 2018-08-30 PROCEDURE — 36415 COLL VENOUS BLD VENIPUNCTURE: CPT | Performed by: FAMILY MEDICINE

## 2018-08-30 PROCEDURE — 85027 COMPLETE CBC AUTOMATED: CPT | Performed by: FAMILY MEDICINE

## 2018-08-30 NOTE — NURSING NOTE
"Chief Complaint   Patient presents with     Consult For     Consult per Dr. Maldonado RE: severe fatigue, snores        Initial /89  Pulse 70  Ht 1.676 m (5' 6\")  Wt 117 kg (258 lb)  SpO2 98%  BMI 41.64 kg/m2 Estimated body mass index is 41.64 kg/(m^2) as calculated from the following:    Height as of this encounter: 1.676 m (5' 6\").    Weight as of this encounter: 117 kg (258 lb).    Medication Reconciliation: complete    Neck circumference: 16 inches / 40 centimeters.    DME: none  "

## 2018-08-30 NOTE — MR AVS SNAPSHOT
After Visit Summary   8/30/2018    Shannon Quiroz    MRN: [id number]           Patient Information     Date Of Birth          1980        Visit Information        Provider Department      8/30/2018 10:00 AM Mikey Wooten MD [facility]        Today's Diagnoses     Restless legs syndrome (RLS)    -  1    Excessive daytime sleepiness        Iron deficiency anemia, unspecified iron deficiency anemia type        Snoring        Apnea        Class 3 obesity due to excess calories without serious comorbidity with body mass index (BMI) of 40.0 to 44.9 in adult (H)          Care Instructions    .  Your BMI is Body mass index is 41.64 kg/(m^2).  Weight management is a personal decision.  If you are interested in exploring weight loss strategies, the following discussion covers the approaches that may be successful. Body mass index (BMI) is one way to tell whether you are at a healthy weight, overweight, or obese. It measures your weight in relation to your height.  A BMI of 18.5 to 24.9 is in the healthy range. A person with a BMI of 25 to 29.9 is considered overweight, and someone with a BMI of 30 or greater is considered obese. More than two-thirds of American adults are considered overweight or obese.  Being overweight or obese increases the risk for further weight gain. Excess weight may lead to heart disease and diabetes.  Creating and following plans for healthy eating and physical activity may help you improve your health.  Weight control is part of healthy lifestyle and includes exercise, emotional health, and healthy eating habits. Careful eating habits lifelong are the mainstay of weight control. Though there are significant health benefits from weight loss, long-term weight loss with diet alone may be very difficult to achieve- studies show long-term success with dietary management in less than 10% of people. Attaining a healthy weight may be especially difficult to  achieve in those with severe obesity. In some cases, medications, devices and surgical management might be considered.  What can you do?  If you are overweight or obese and are interested in methods for weight loss, you should discuss this with your provider.     Consider reducing daily calorie intake by 500 calories.     Keep a food journal.     Avoiding skipping meals, consider cutting portions instead.    Diet combined with exercise helps maintain muscle while optimizing fat loss. Strength training is particularly important for building and maintaining muscle mass. Exercise helps reduce stress, increase energy, and improves fitness. Increasing exercise without diet control, however, may not burn enough calories to loose weight.       Start walking three days a week 10-20 minutes at a time    Work towards walking thirty minutes five days a week     Eventually, increase the speed of your walking for 1-2 minutes at time    In addition, we recommend that you review healthy lifestyles and methods for weight loss available through the National Institutes of Health patient information sites:  http://win.niddk.nih.gov/publications/index.htm    And look into health and wellness programs that may be available through your health insurance provider, employer, local community center, or issac club.    Weight management plan: Patient was referred to their PCP to discuss a diet and exercise plan.            Follow-ups after your visit        Your next 10 appointments already scheduled     Sep 14, 2018  1:00 PM CDT   HST  with SLEEP LAB[place]   [facility]    [address]  [facility] [address]   [phone]            Sep 17, 2018  1:00 PM CDT   HST Drop Off with SLEEP LAB, [place]ED FIVE   [facility]    [address] [facility] [address]   [phone]            Sep 28, 2018  4:00 PM CDT  "  Telephone Visit with Mikey Wooten MD   [facility]    [address]  [facility] [address] [phone]           Note: this is not an onsite visit; there is no need to come to the facility.              Who to contact     If you have questions or need follow up information about today's clinic visit or your schedule please contact [facility] directly at [phone].  Normal or non-critical lab and imaging results will be communicated to you by Empathy Cohart, letter or phone within 4 business days after the clinic has received the results. If you do not hear from us within 7 days, please contact the clinic through [employer] or phone. If you have a critical or abnormal lab result, we will notify you by phone as soon as possible.  Submit refill requests through [employer] or call your pharmacy and they will forward the refill request to us. Please allow 3 business days for your refill to be completed.          Additional Information About Your Visit        Empathy Cohar[place] Therapist Information     [employer] gives you secure access to your electronic health record. If you see a primary care provider, you can also send messages to your care team and make appointments. If you have questions, please call your primary care clinic.  If you do not have a primary care provider, please call [phone] and they will assist you.        Care EveryWhere ID     This is your Care EveryWhere ID. This could be used by other organizations to access your [facility] medical records  [id number]        Your Vitals Were     Pulse Height Pulse Oximetry BMI (Body Mass Index)          70 1.676 m (5' 6\") 98% 41.64 kg/m2         Blood Pressure from Last 3 Encounters:   08/30/18 124/89   07/26/18 128/74   06/11/18 (!) 138/100    Weight from Last 3 Encounters:   08/30/18 117 kg (258 lb)   07/26/18 115.4 kg (254 lb 6.4 oz)   06/11/18 112.5 kg (248 lb)              We Performed the " Following     CBC with platelets     Ferritin     Iron and iron binding capacity     SLEEP EVALUATION & MANAGEMENT REFERRAL - ADULT -[facility]  [phone] (Age 2 and up)        Primary Care Provider Office Phone # Fax #    Jennifer Monge DO Joel [phone] [address] [place] [address]        Equal Access to Services     [facility] [place] : Hadii aad ku hadasho Soomaali, waaxda luqadaha, qaybta kaalmada adeegyada, waxay idiin hayaan adeeg kharash laRenettaaan . So [facility] [phone].    ATENCIÓN: Si habla español, tiene a alcocer disposición servicios gratuitos de asistencia lingüística. Francisca al [phone].    We comply with applicable federal civil rights laws and [place] laws. We do not discriminate on the basis of race, color, national origin, age, disability, sex, sexual orientation, or gender identity.            Thank you!     Thank you for choosing [facility]  for your care. Our goal is always to provide you with excellent care. Hearing back from our patients is one way we can continue to improve our services. Please take a few minutes to complete the written survey that you may receive in the mail after your visit with us. Thank you!             Your Updated Medication List - Protect others around you: Learn how to safely use, store and throw away your medicines at www.disposemymeds.org.          This list is accurate as of 8/30/18 11:59 PM.  Always use your most recent med list.                   Brand Name Dispense Instructions for use Diagnosis    SUMAtriptan 100 MG tablet    IMITREX     TAKE ONE TAB BY MOUTH AT ONSET OF HEADACHE, MAY REPEAT ONCE IN 2 HOURS. MAX 200MG IN 24 HOURS        venlafaxine 37.5 MG 24 hr capsule    EFFEXOR-XR    30 capsule    Take 1 capsule (37.5 mg) by mouth daily

## 2018-08-30 NOTE — PROGRESS NOTES
"  Sleep Consultation:    Date on this visit: 8/30/2018    Shannon Quiroz is sent by Jennifer Maldonado for a sleep consultation regarding excessive daytime sleepiness.    Primary Physician: Jennifer Maldonado     Chief complaint: \"Feeling tired all the time.\"    HPI:  Shannon Quiroz is a pleasant 39 yo F with history of iron deficiency anemia, obesity, MDD who presents for a sleep evaluation in regards to hypersomnia.    She associates her sleepiness worsening following a surprise third pregnancy with numerous complications including lumbar dis herniation but did have normal vaginal delivery.  Led to decrease in physical activity and subsequent weight gain.  Also complicated by post partum depression, improved with venlafaxine at zenith of 150mg and is currently tapering, on 37.5mg.    She feels the sleepiness has been worsening over the past ~2 years.  Previously, she felt she was rested with total sleep time of ~6 hours, now sleeping 7-8 hours and daytime naps.    She also notes some worsening feeling of leg restless at night, but also can be present during the day in movies.  Noted by  to have frequent leg movements in sleep.    Reports loud snoring for 1-2 years, observed apnea, AM headaches.  Also reports significant teeth clenching (not new).    Menstruation has been regular following her last pregnancy, no issues with breast feeding.    Family history of maternal uncle with MARJORIE, father with suspected MARJORIE, 8yo daughter with snoring and mandibular insufficiency.    On review of records from [facility], seen to have microcytic anemia on 11/9/2017 with Hgb 11.2, MCV 73.  No iron studies or ferritin found.    SHx:  , works as dance teacher.  3 children, ages 4yo, 6yo, 8yo.      Allergies:    No Known Allergies    Medications:    Current Outpatient Prescriptions   Medication Sig Dispense Refill     SUMAtriptan (IMITREX) 100 MG tablet TAKE ONE TAB BY MOUTH AT ONSET OF HEADACHE, MAY REPEAT ONCE IN 2 HOURS. " MAX 200MG IN 24 HOURS  6     venlafaxine (EFFEXOR-XR) 37.5 MG 24 hr capsule Take 1 capsule (37.5 mg) by mouth daily 30 capsule 1       Problem List:  Patient Active Problem List    Diagnosis Date Noted     Major depressive disorder, recurrent episode, mild (H) 08/01/2018     Priority: Medium     Excessive daytime sleepiness 06/20/2018     Priority: Medium     Weight gain 06/20/2018     Priority: Medium     Major depression in complete remission (H) 06/20/2018     Priority: Medium     Throat pain 06/20/2018     Priority: Medium     Morbid obesity (H) 06/20/2018     Priority: Medium     CARDIOVASCULAR SCREENING; LDL GOAL LESS THAN 160 10/31/2010     Priority: Medium     Abdominal pain 08/10/2009     Priority: Medium     Cholelithiasis without cholecystitis 04/21/2009     Priority: Medium     IMO update changed this record. Please review for accuracy       Epigastric pain 04/21/2009     Priority: Medium        Past Medical/Surgical History:  Past Medical History:   Diagnosis Date     Nephrolithiasis      Past Surgical History:   Procedure Laterality Date     CHOLECYSTECTOMY, LAPOROSCOPIC  6-1-09     SURGICAL HISTORY OF -   1/7/09    vaginal birth     SURGICAL HISTORY OF -   3/1/08    Kidney stone removed       Social History:  Social History     Social History     Marital status:      Spouse name: N/A     Number of children: N/A     Years of education: N/A     Occupational History     Not on file.     Social History Main Topics     Smoking status: Never Smoker     Smokeless tobacco: Never Used     Alcohol use No     Drug use: Not on file     Sexual activity: Yes     Partners: Male     Other Topics Concern     Not on file     Social History Narrative       Family History:  Family History   Problem Relation Age of Onset     Breast Cancer Maternal Grandmother      age 70     Cancer Paternal Grandfather      age 40, brain cancer     Breast Cancer Mother        Review of Systems:  A complete review of systems  "reviewed by me is negative with the exeption of what has been mentioned in the history of present illness.  CONSTITUTIONAL:  POSITIVE for  weight gain and sweats  EYES:  POSITIVE for changes in vision  ENT: NEGATIVE for ear pain, sore throat, sinus pain, post-nasal drip, runny nose, bloody nose  CARDIAC: NEGATIVE for fast heartbeats or fluttering in chest, chest pain or pressure, breathlessness when lying flat, swollen legs or swollen feet.  NEUROLOGIC:  POSITIVE for  headaches  DERMATOLOGIC: NEGATIVE for rashes, new moles or change in mole(s)  PULMONARY:  POSITIVE for  productive cough  GASTROINTESTINAL: NEGATIVE for nausea or vomitting, loose or watery stools, fat or grease in stools, constipation, abdominal pain, bowel movements black in color or blood noted.  GENITOURINARY: NEGATIVE for pain during urination, blood in urine, urinating more frequently than usual, irregular menstrual periods.  MUSCULOSKELETAL:  POSITIVE for  bone or joint pain  ENDOCRINE: NEGATIVE for increased thirst or urination, diabetes.  LYMPHATIC: NEGATIVE for swollen lymph nodes, lumps or bumps in the breasts or nipple discharge.    Physical Examination:  Vitals: /89  Pulse 70  Ht 1.676 m (5' 6\")  Wt 117 kg (258 lb)  SpO2 98%  BMI 41.64 kg/m2  BMI= Body mass index is 41.64 kg/(m^2).    Neck Cir (cm): 40 cm    Sabinal Total Score 8/30/2018   Total score - Sabinal 18            GENERAL APPEARANCE: healthy, alert, no distress and over weight  RESP: lungs clear to auscultation - no rales, rhonchi or wheezes  CV: regular rates and rhythm, normal S1 S2, no S3 or S4 and no murmur, click or rub  PSYCH: mentation appears normal and affect normal/bright    Impression/Plan:    Shannon Quiroz is a pleasant 39 yo F with history of iron deficiency anemia, obesity, MDD who presents for a sleep evaluation in regards to hypersomnia.    1.)  High probability for MARJORIE with STOP-BANG of 4-5  2.)  Iron deficiency anemia  3.)  RLS  4.)  Bruxism    Will " arrange home sleep test for evaluation in regards to MARJORIE.  Given prior evidence of iron deficiency anemia and RLS, will recheck CBC, ferritin, iron studies.    Plan as given to patient as above.    I have spent 60 minutes with this patient today in which greater than 50% of this time was spent in the counseling / coordination of care regarding MARJORIE, anemia.    Polysomnography reviewed.  Obstructive sleep apnea reviewed.  Complications of untreated sleep apnea were reviewed.    Mikey Wooten MD    CC: Jennifer Maldonado

## 2018-08-30 NOTE — PATIENT INSTRUCTIONS
.  Your BMI is Body mass index is 41.64 kg/(m^2).  Weight management is a personal decision.  If you are interested in exploring weight loss strategies, the following discussion covers the approaches that may be successful. Body mass index (BMI) is one way to tell whether you are at a healthy weight, overweight, or obese. It measures your weight in relation to your height.  A BMI of 18.5 to 24.9 is in the healthy range. A person with a BMI of 25 to 29.9 is considered overweight, and someone with a BMI of 30 or greater is considered obese. More than two-thirds of American adults are considered overweight or obese.  Being overweight or obese increases the risk for further weight gain. Excess weight may lead to heart disease and diabetes.  Creating and following plans for healthy eating and physical activity may help you improve your health.  Weight control is part of healthy lifestyle and includes exercise, emotional health, and healthy eating habits. Careful eating habits lifelong are the mainstay of weight control. Though there are significant health benefits from weight loss, long-term weight loss with diet alone may be very difficult to achieve- studies show long-term success with dietary management in less than 10% of people. Attaining a healthy weight may be especially difficult to achieve in those with severe obesity. In some cases, medications, devices and surgical management might be considered.  What can you do?  If you are overweight or obese and are interested in methods for weight loss, you should discuss this with your provider.     Consider reducing daily calorie intake by 500 calories.     Keep a food journal.     Avoiding skipping meals, consider cutting portions instead.    Diet combined with exercise helps maintain muscle while optimizing fat loss. Strength training is particularly important for building and maintaining muscle mass. Exercise helps reduce stress, increase energy, and improves  fitness. Increasing exercise without diet control, however, may not burn enough calories to loose weight.       Start walking three days a week 10-20 minutes at a time    Work towards walking thirty minutes five days a week     Eventually, increase the speed of your walking for 1-2 minutes at time    In addition, we recommend that you review healthy lifestyles and methods for weight loss available through the National Institutes of Health patient information sites:  http://win.niddk.nih.gov/publications/index.htm    And look into health and wellness programs that may be available through your health insurance provider, employer, local community center, or issac club.    Weight management plan: Patient was referred to their PCP to discuss a diet and exercise plan.

## 2018-09-14 ENCOUNTER — OFFICE VISIT (OUTPATIENT)
Dept: SLEEP MEDICINE | Facility: CLINIC | Age: 38
End: 2018-09-14
Payer: COMMERCIAL

## 2018-09-14 DIAGNOSIS — R06.83 SNORING: ICD-10-CM

## 2018-09-14 DIAGNOSIS — D50.9 IRON DEFICIENCY ANEMIA, UNSPECIFIED IRON DEFICIENCY ANEMIA TYPE: ICD-10-CM

## 2018-09-14 DIAGNOSIS — R06.81 APNEA: ICD-10-CM

## 2018-09-14 DIAGNOSIS — G47.19 EXCESSIVE DAYTIME SLEEPINESS: ICD-10-CM

## 2018-09-14 DIAGNOSIS — G25.81 RESTLESS LEGS SYNDROME (RLS): ICD-10-CM

## 2018-09-14 PROCEDURE — G0399 HOME SLEEP TEST/TYPE 3 PORTA: HCPCS | Performed by: FAMILY MEDICINE

## 2018-09-14 NOTE — PROGRESS NOTES
SUBJECTIVE:  Chief Complaint   Patient presents with     Sleep Study     home sleep test          OBJECTIVE:  home sleep apnea test ordered.    Instructions were reviewed with Shannon and were also printed for Shannon to take with her.   Shannon verbalized understanding and demonstrated use very well.     ASSESSMENT/PLAN:  Shannon received home sleep apnea test today September 14, 2018. Pt will use device and will return on 9/17/18         Pt is completing a home sleep test. Pt was instructed on how to put on the Noxturnal T3 device and associated equipment before going to bed and given the opportunity to practice putting it on before leaving the sleep center. Pt was reminded to bring the home sleep test kit back to the center tomorrow, at agreed upon time for download and reporting.

## 2018-09-14 NOTE — MR AVS SNAPSHOT
After Visit Summary   9/14/2018    Shannon Quiroz    MRN: [id number]           Patient Information     Date Of Birth          1980        Visit Information        Provider Department      9/14/2018 1:00 PM SLEEP LAB, BED FIVE [facility]        Today's Diagnoses     Excessive daytime sleepiness        Restless legs syndrome (RLS)        Iron deficiency anemia, unspecified iron deficiency anemia type        Class 3 obesity due to excess calories without serious comorbidity with body mass index (BMI) of 40.0 to 44.9 in adult (H)        Apnea        Snoring           Follow-ups after your visit        Your next 10 appointments already scheduled     Sep 17, 2018  1:00 PM CDT   HST Drop Off with SLEEP LAB, BED FIVE   [facility]    [address] [address]   [phone]            Sep 28, 2018  4:00 PM CDT   Telephone Visit with Mikey Wooten MD   [facility]    [address] [address]   [phone]           Note: this is not an onsite visit; there is no need to come to the facility.              Who to contact     If you have questions or need follow up information about today's clinic visit or your schedule please contact [facility] directly at [phone].  Normal or non-critical lab and imaging results will be communicated to you by MyChart, letter or phone within 4 business days after the clinic has received the results. If you do not hear from us within 7 days, please contact the clinic through MyChart or phone. If you have a critical or abnormal lab result, we will notify you by phone as soon as possible.  Submit refill requests through [employer] or call your pharmacy and they will forward the refill request to us. Please allow 3 business days for your refill to be completed.          Additional  Information About Your Visit        [employer] Information     [employer] gives you secure access to your electronic health record. If you see a primary care provider, you can also send messages to your care team and make appointments. If you have questions, please call your primary care clinic.  If you do not have a primary care provider, please call [phone] and they will assist you.        Care EveryWhere ID     This is your Care EveryWhere ID. This could be used by other organizations to access your [place] medical records  [id number]         Blood Pressure from Last 3 Encounters:   08/30/18 124/89   07/26/18 128/74   06/11/18 (!) 138/100    Weight from Last 3 Encounters:   08/30/18 117 kg (258 lb)   07/26/18 115.4 kg (254 lb 6.4 oz)   06/11/18 112.5 kg (248 lb)              We Performed the Following     HST-Home Sleep Apnea Test        Primary Care Provider Office Phone # Fax #    Jennifer Vallemarcelo Maldonado [phone] [address] [place] [address]        Equal Access to Services     [facility]: Hadii aad ku hadasho Soomaali, waaxda luqadaha, qaybta kaalmada adeegyada, kevon chan . So [facility] [phone].    ATENCIÓN: Si habla español, tiene a alcocer disposición servicios gratuitos de asistencia lingüística. [facility] [phone].    We comply with applicable federal civil rights laws and [place] laws. We do not discriminate on the basis of race, color, national origin, age, disability, sex, sexual orientation, or gender identity.            Thank you!     Thank you for choosing [facility]  for your care. Our goal is always to provide you with excellent care. Hearing back from our patients is one way we can continue to improve our services. Please take a few minutes to complete the written survey that you may receive in the mail after your visit with us. Thank you!             Your Updated Medication List - Protect others around you: Learn  how to safely use, store and throw away your medicines at www.disposemymeds.org.          This list is accurate as of 9/14/18  4:08 PM.  Always use your most recent med list.                   Brand Name Dispense Instructions for use Diagnosis    SUMAtriptan 100 MG tablet    IMITREX     TAKE ONE TAB BY MOUTH AT ONSET OF HEADACHE, MAY REPEAT ONCE IN 2 HOURS. MAX 200MG IN 24 HOURS        venlafaxine 37.5 MG 24 hr capsule    EFFEXOR-XR    30 capsule    Take 1 capsule (37.5 mg) by mouth daily

## 2018-09-17 ENCOUNTER — DOCUMENTATION ONLY (OUTPATIENT)
Dept: SLEEP MEDICINE | Facility: CLINIC | Age: 38
End: 2018-09-17
Payer: COMMERCIAL

## 2018-09-17 NOTE — PROGRESS NOTES
This HSAT was performed using a Noxturnal T3 device which recorded snore, sound, movement activity, body position, nasal pressure, oronasal thermal airflow, pulse, oximetry and both chest and abdominal respiratory effort. HSAT data was restricted to the time patient states they were in bed.     HSAT was scored using 1B 4% hypopnea rule.     HST AHI (Non-PAT): 51.4  Snoring was reported as loud  Time with SpO2 below 89% was 30 minutes.   Overall signal quality was good  Pt will follow up with sleep provider to determine appropriate therapy.

## 2018-09-18 ENCOUNTER — DOCUMENTATION ONLY (OUTPATIENT)
Dept: SLEEP MEDICINE | Facility: CLINIC | Age: 38
End: 2018-09-18

## 2018-09-18 NOTE — PROCEDURES
"HOME SLEEP STUDY INTERPRETATION    Patient: Shannon Quiroz  MRN: [id number]  YOB: 1980  Study Date: 9/15/2018  Referring Provider: Jennifer Maldonado  Ordering Provider: Mikey Wooten MD     Indications for Home Study: Shannon Quiroz is a 38 year old female with a history of iron deficiency anemia, obesity, MDD who presents with symptoms suggestive of obstructive sleep apnea.    Estimated body mass index is 41.64 kg/(m^2) as calculated from the following:    Height as of 18: 1.676 m (5' 6\").    Weight as of 18: 117 kg (258 lb).  Total score - [place]: 18 (2018 10:00 AM)  STOP-BAN-    Data: A full night home sleep study was performed recording the standard physiologic parameters including body position, movement, sound, nasal pressure, thermal oral airflow, chest and abdominal movements with respiratory inductance plethysmography, and oxygen saturation by pulse oximetry. Pulse rate was estimated by oximetry recording. This study was considered adequate based on > 4 hours of quality oximetry and respiratory recording. As specified by the AASM Manual for the Scoring of Sleep and Associated events, version 2.3, Rule VIII.D 1B, 4% oxygen desaturation scoring for hypopneas is used as a standard of care on all home sleep apnea testing.    Analysis Time:  599.8 minutes    Respiration:   Sleep Associated Hypoxemia: sustained hypoxemia was not present. Baseline oxygen saturation was 94.9%.  Time with saturation less than or equal to 88% was 30 minutes. The lowest oxygen saturation was 82%.   Snoring: Snoring was present.  Respiratory events: The home study revealed a presence of 310 obstructive apneas and 5 mixed and central apneas. There were 199 hypopneas resulting in a combined apnea/hypopnea index [AHI] of 51.4 events per hour.  AHI was 77.8 per hour supine, - per hour prone, 44.7 per hour on left side, and 41 per hour on right side.   Pattern: Excluding events noted above, " respiratory rate and pattern was Normal.    Position: Percent of time spent: supine - 24.7%, prone - 0%, on left - 37.2%, on right - 38.1%.    Heart Rate: By pulse oximetry normal rate was noted.     Assessment:   Severe obstructive sleep apnea.  Sleep associated hypoxemia was present.    Recommendations:  Consider auto-CPAP at 5-15 cmH2O or polysomnography with full night PAP titration.  Suggest optimizing sleep hygiene and avoiding sleep deprivation.  Weight management.    Diagnosis Code(s): Obstructive Sleep Apnea G47.33, Hypoxemia G47.36    Mikey Wooten MD, MD, September 18, 2018   Diplomate, American Board of Family Medicine, Sleep Medicine

## 2018-09-21 DIAGNOSIS — F33.0 MAJOR DEPRESSIVE DISORDER, RECURRENT EPISODE, MILD (H): Primary | ICD-10-CM

## 2018-09-21 RX ORDER — VENLAFAXINE HYDROCHLORIDE 37.5 MG/1
CAPSULE, EXTENDED RELEASE ORAL
Qty: 30 CAPSULE | Refills: 1 | Status: SHIPPED | OUTPATIENT
Start: 2018-09-21 | End: 2018-11-23

## 2018-09-21 NOTE — TELEPHONE ENCOUNTER
Routing refill request to provider for review/approval because:  Last PHQ-9 in July was 9. Kimber Rowland RN

## 2018-09-21 NOTE — TELEPHONE ENCOUNTER
Med filled.  Pt was to follow up after last visit in July.  Please call pt and ask her for an update    Jennifer Maldonado

## 2018-09-21 NOTE — TELEPHONE ENCOUNTER
"Requested Prescriptions   Pending Prescriptions Disp Refills     venlafaxine (EFFEXOR-XR) 37.5 MG 24 hr capsule [Pharmacy Med Name: VENLAFAXINE HCL ER 37.5MG CP24] 30 capsule 1    Last Written Prescription Date:  07/26/2018 #30 x 1  Last filled 0/8/27/2018  Last office visit: 7/26/2018 SHELDON Maldonado     Future Office Visit:   Next 5 appointments (look out 90 days)     Sep 28, 2018  4:00 PM CDT   Telephone Visit with Mikey Wooten MD   [facility] ([facility]    [id number]  [facility] [address]   [phone]                  Sig: TAKE ONE CAPSULE BY MOUTH EVERY DAY    Serotonin-Norepinephrine Reuptake Inhibitors  Failed    9/21/2018  5:01 AM       Failed - Normal serum creatinine on file in past 12 months    No lab results found.         Passed - Blood pressure under 140/90 in past 12 months    BP Readings from Last 3 Encounters:   08/30/18 124/89   07/26/18 128/74   06/11/18 (!) 138/100                Passed - Recent (12 mo) or future (30 days) visit within the authorizing provider's specialty    Patient had office visit in the last 12 months or has a visit in the next 30 days with authorizing provider or within the authorizing provider's specialty.  See \"Patient Info\" tab in inbasket, or \"Choose Columns\" in Meds & Orders section of the refill encounter.           Passed - Patient is age 18 or older       Passed - No active pregnancy on record       Passed - No positive pregnancy test in past 12 months          "

## 2018-09-26 NOTE — TELEPHONE ENCOUNTER
Pt states she is doing well on this dosage and doesn't need any adjustments at this time.    Marielos Mota, Station

## 2018-09-28 ENCOUNTER — MYC MEDICAL ADVICE (OUTPATIENT)
Dept: SLEEP MEDICINE | Facility: CLINIC | Age: 38
End: 2018-09-28

## 2018-09-28 ENCOUNTER — VIRTUAL VISIT (OUTPATIENT)
Dept: SLEEP MEDICINE | Facility: CLINIC | Age: 38
End: 2018-09-28
Payer: COMMERCIAL

## 2018-09-28 DIAGNOSIS — G47.33 OSA (OBSTRUCTIVE SLEEP APNEA): Primary | ICD-10-CM

## 2018-09-28 PROCEDURE — 99442 ZZC PHYSICIAN TELEPHONE EVALUATION 11-20 MIN: CPT | Performed by: FAMILY MEDICINE

## 2018-09-28 NOTE — MR AVS SNAPSHOT
After Visit Summary   9/28/2018    Shannon Quiroz    MRN: [id number]           Patient Information     Date Of Birth          1980        Visit Information        Provider Department      9/28/2018 4:00 PM Mikey Wooten MD [facility]        Today's Diagnoses     MARJORIE (obstructive sleep apnea)    -  1       Follow-ups after your visit        Who to contact     If you have questions or need follow up information about today's clinic visit or your schedule please contact [facility] directly at [phone].  Normal or non-critical lab and imaging results will be communicated to you by Justin[employer]TV[employer], letter or phone within 4 business days after the clinic has received the results. If you do not hear from us within 7 days, please contact the clinic through [employer] or phone. If you have a critical or abnormal lab result, we will notify you by phone as soon as possible.  Submit refill requests through [employer] or call your pharmacy and they will forward the refill request to us. Please allow 3 business days for your refill to be completed.          Additional Information About Your Visit        MyChart Information     [employer] gives you secure access to your electronic health record. If you see a primary care provider, you can also send messages to your care team and make appointments. If you have questions, please call your primary care clinic.  If you do not have a primary care provider, please call [phone] and they will assist you.        Care EveryWhere ID     This is your Care EveryWhere ID. This could be used by other organizations to access your [place] medical records  [id number]         Blood Pressure from Last 3 Encounters:   08/30/18 124/89   07/26/18 128/74   06/11/18 (!) 138/100    Weight from Last 3 Encounters:   08/30/18 117 kg (258 lb)   07/26/18 115.4 kg (254 lb 6.4 oz)   06/11/18 112.5 kg (248 lb)              We Performed the Following      Comprehensive DME        Primary Care Provider Office Phone # Fax #    Jennifer Monge Joel  [phone] [address] [place] [address]        Equal Access to Services     CHAZ ALONZO : Hadii aad ku hadkasio Soomaali, waaxda luqadaha, qaybta kaalmada adeegyada, kevon frost laRenettarajiv mikie. So [facility] [phone].    ATENCIÓN: Si habla español, tiene a alcocer disposición servicios gratuitos de asistencia lingüística. Llame al [phone].    We comply with applicable federal civil rights laws and [place] laws. We do not discriminate on the basis of race, color, national origin, age, disability, sex, sexual orientation, or gender identity.            Thank you!     Thank you for choosing [facility]  for your care. Our goal is always to provide you with excellent care. Hearing back from our patients is one way we can continue to improve our services. Please take a few minutes to complete the written survey that you may receive in the mail after your visit with us. Thank you!             Your Updated Medication List - Protect others around you: Learn how to safely use, store and throw away your medicines at www.disposemymeds.org.          This list is accurate as of 9/28/18  4:03 PM.  Always use your most recent med list.                   Brand Name Dispense Instructions for use Diagnosis    SUMAtriptan 100 MG tablet    IMITREX     TAKE ONE TAB BY MOUTH AT ONSET OF HEADACHE, MAY REPEAT ONCE IN 2 HOURS. MAX 200MG IN 24 HOURS        venlafaxine 37.5 MG 24 hr capsule    EFFEXOR-XR    30 capsule    TAKE ONE CAPSULE BY MOUTH EVERY DAY    Major depressive disorder, recurrent episode, mild (H)

## 2018-09-28 NOTE — PROGRESS NOTES
"Shannon Quiroz is a 38 year old female who is being evaluated via a telephone visit.      The patient has been notified of following:     \"This telephone visit will be conducted via a call between you and your physician/provider. We have found that certain health care needs can be provided without the need for a physical exam.  This service lets us provide the care you need with a short phone conversation.  If a prescription is necessary we can send it directly to your pharmacy.  If lab work is needed we can place an order for that and you can then stop by our lab to have the test done at a later time.    We will bill your insurance company for this service.  Please check with your medical insurance if this type of visit is covered. You may be responsible for the cost of this type of visit if insurance coverage is denied.  The typical cost is $30 (10min), $59 (11-20min) and $85 (21-30min).  Most often these visits are shorter than 10 minutes.    If during the course of the call the physician/provider feels a telephone visit is not appropriate, you will not be charged for this service.\"       Consent has been obtained for this service by care team member: yes.   See the scanned image in the medical record.    Shannon Quiroz complains of  Sleep Study (Discuss sleep study)      I have reviewed and updated the patient's Past Medical History, Social History, Family History and Medication List.    ALLERGIES  Review of patient's allergies indicates no known allergies.    Radha Encinas CMA   (MA signature)    Additional provider notes: Reviewed HST performed 9/15/2018 with weight 258 lbs, AHI 51.4, baseline SpO2 94.9%, aparna SpO2 82%, SpO2 <= 88% of 30 minutes.    Assessment/Plan:  1.)  Severe MARJORIE with sleep-associated hypoxemia   - Discussed I strongly recommend treatment to reduce sleepiness and long-term cardiovascular risk factors.   - Will arrange CPAP auto-titrate 5-15 cm H2O.    I have reviewed the note as " documented above.  This accurately captures the substance of my conversation with the patient,  Shannon    Total time of call between patient and provider was 15 minutes

## 2018-10-15 ENCOUNTER — DOCUMENTATION ONLY (OUTPATIENT)
Dept: SLEEP MEDICINE | Facility: CLINIC | Age: 38
End: 2018-10-15
Payer: COMMERCIAL

## 2018-10-15 NOTE — PROGRESS NOTES
Patient was offered choice of vendor and chose [facility].  Patient Shannon Quiroz was set up at [facility]  on October 15, 2018. Patient received a Resmed AirSense 10 Auto. Pressures were set at 5-15 cm H2O.   Patient s ramp is 5 cm H2O for Auto and FLEX/EPR is 2.  Patient received a [employer] Mask name: DREAMWEAR  Full Face mask Size Medium, heated tubing and heated humidifier.  Patient is enrolled in the STM Program and does need to meet compliance.  Shraddha Veloz

## 2018-10-18 ENCOUNTER — DOCUMENTATION ONLY (OUTPATIENT)
Dept: SLEEP MEDICINE | Facility: CLINIC | Age: 38
End: 2018-10-18
Payer: COMMERCIAL

## 2018-10-18 NOTE — PROGRESS NOTES
3 DAY STM VISIT    Diagnostic AHI:  51.4HST    Patient contacted for 3 day STM visit  Message left for patient to return call     Device type: Auto-CPAP  PAP settings from order::  CPAP min 5 cm  H20       CPAP max 15 cm  H20  Mask type:    Nasal Mask     Device settings from machine      Min CPAP 5.0            Max CPAP 15.0      Assessment: Nightly usage over four hours.  Action plan: Pt to have f/u 14 day STM visit.  Patient has a follow up visit scheduled:   no.

## 2018-10-22 ENCOUNTER — TELEPHONE (OUTPATIENT)
Dept: SLEEP MEDICINE | Facility: CLINIC | Age: 38
End: 2018-10-22

## 2018-10-22 NOTE — TELEPHONE ENCOUNTER
Please call and place orders for what you are recommending for her low iron levels. The result note stated oral OTC replacement but the patient said on her virtual visit you recommended an infusion due to the low Ferritin level. What would you like her to do? Please place orders for the infusion if that is what you recommend.

## 2018-10-29 NOTE — TELEPHONE ENCOUNTER
I would recommend we start with oral iron replacement, taken 2 to 3 times daily with a source of vitamin C, with meals for 3 months.  This is available over the counter (slow Fe is a common option with the correct amount) and vitamin C can be something like orange juice or a pill supplement (called Ascorbic Acid, usually 200-250mg).    At the end of 3 months, we will recheck the ferritin, hemoglobin and iron studies and if still quite low, then I would recommend we consider the IV iron infusion.  Luckily, the hemoglobin level was only slightly low, so I would like to try the oral replacement first.

## 2018-10-30 ENCOUNTER — DOCUMENTATION ONLY (OUTPATIENT)
Dept: SLEEP MEDICINE | Facility: CLINIC | Age: 38
End: 2018-10-30
Payer: COMMERCIAL

## 2018-10-30 NOTE — PROGRESS NOTES
14 DAY STM VISIT    Diagnostic AHI:  51.4 HST    Message left for patient to return call.     Assessment: Pt meeting objective benchmarks.     Action plan: Waiting for patient to return call.     Device type: Auto-CPAP  PAP settings: CPAP min 5.0 cm  H20     CPAP max 15.0 cm  H20    CPAP fixed 0 cm  H20    95th% pressure 13.5 cm   Mask type:  Nasal Mask  Objective measures: 14 day rolling measures      Compliance  78 %      Leak  1.03 lpm  last  upload      AHI 1.59   last  upload      Average number of minutes 369     Average hours of usage 6.1          Objective measure goal  Compliance   Goal >70%  Leak   Goal < 24 lpm  AHI  Goal < 5  Usage  Goal >240

## 2018-11-14 ENCOUNTER — COMMUNICATION - HEALTHEAST (OUTPATIENT)
Dept: FAMILY MEDICINE | Facility: CLINIC | Age: 38
End: 2018-11-14

## 2018-11-14 DIAGNOSIS — G43.909 MIGRAINE HEADACHE: ICD-10-CM

## 2018-11-15 ENCOUNTER — DOCUMENTATION ONLY (OUTPATIENT)
Dept: SLEEP MEDICINE | Facility: CLINIC | Age: 38
End: 2018-11-15
Payer: COMMERCIAL

## 2018-11-15 NOTE — PROGRESS NOTES
30 DAY STM VISIT    Diagnostic AHI:  51.4 HST    Message left for patient to return call.     Assessment: Pt not meeting objective benchmarks for compliance.  Action plan: 2 week STM recheck appt scheduled.  Patient has not scheduled a follow up visit.   Device type: Auto-CPAP  PAP settings: CPAP min 5.0 cm  H20     CPAP max 15.0 cm  H20  Mask type:  Nasal Mask  Objective measures: 14 day rolling measures       Average number of minutes 32      Objective measure goal  Compliance   Goal >70%  Leak   Goal < 24 lpm  AHI  Goal < 5  Usage  Goal >240

## 2018-11-19 DIAGNOSIS — G43.829 MENSTRUAL MIGRAINE WITHOUT STATUS MIGRAINOSUS, NOT INTRACTABLE: Primary | ICD-10-CM

## 2018-11-20 RX ORDER — SUMATRIPTAN 100 MG/1
TABLET, FILM COATED ORAL
Qty: 30 TABLET | Refills: 2 | Status: SHIPPED | OUTPATIENT
Start: 2018-11-20 | End: 2019-09-19

## 2018-11-20 NOTE — TELEPHONE ENCOUNTER
Routing refill request to provider for review/approval because:  Medication is reported/historical    Estefany Veloz RN

## 2018-11-23 DIAGNOSIS — F33.0 MAJOR DEPRESSIVE DISORDER, RECURRENT EPISODE, MILD (H): ICD-10-CM

## 2018-11-23 RX ORDER — VENLAFAXINE HYDROCHLORIDE 37.5 MG/1
CAPSULE, EXTENDED RELEASE ORAL
Qty: 30 CAPSULE | Refills: 1 | Status: SHIPPED | OUTPATIENT
Start: 2018-11-23 | End: 2019-01-30

## 2018-11-23 NOTE — TELEPHONE ENCOUNTER
"Requested Prescriptions   Pending Prescriptions Disp Refills     venlafaxine (EFFEXOR-XR) 37.5 MG 24 hr capsule [Pharmacy Med Name: VENLAFAXINE HCL ER 37.5MG CP24] 30 capsule 1    Last Written Prescription Date:  09/21/2018 #30 x 1  Last filled 10/22/2018  Last office visit: 7/26/2018 SHELDON Maldonado   Future Office Visit: None     Sig: TAKE ONE CAPSULE BY MOUTH EVERY DAY    Serotonin-Norepinephrine Reuptake Inhibitors  Failed    11/23/2018  5:01 AM       Failed - PHQ-9 score of less than 5 in past 6 months    Please review last PHQ-9 score.   PHQ-9 SCORE 6/11/2018 7/26/2018   Total Score 10 9       RAPHAEL-7 SCORE 6/11/2018   Total Score 0                Failed - Normal serum creatinine on file in past 12 months    No lab results found.         Passed - Blood pressure under 140/90 in past 12 months    BP Readings from Last 3 Encounters:   08/30/18 124/89   07/26/18 128/74   06/11/18 (!) 138/100                Passed - Patient is age 18 or older       Passed - No active pregnancy on record       Passed - No positive pregnancy test in past 12 months       Passed - Recent (6 mo) or future (30 days) visit within the authorizing provider's specialty    Patient had office visit in the last 6 months or has a visit in the next 30 days with authorizing provider or within the authorizing provider's specialty.  See \"Patient Info\" tab in inbasket, or \"Choose Columns\" in Meds & Orders section of the refill encounter.              "

## 2018-11-23 NOTE — TELEPHONE ENCOUNTER
Routing refill request to provider for review/approval because:  PHQ-9=0    Estefany Veloz RN

## 2018-11-29 ENCOUNTER — DOCUMENTATION ONLY (OUTPATIENT)
Dept: SLEEP MEDICINE | Facility: CLINIC | Age: 38
End: 2018-11-29
Payer: COMMERCIAL

## 2018-12-18 ENCOUNTER — TELEPHONE (OUTPATIENT)
Dept: SLEEP MEDICINE | Facility: CLINIC | Age: 38
End: 2018-12-18

## 2018-12-18 NOTE — TELEPHONE ENCOUNTER
I called patient back as she called into [facility] and left a voicemail wanting to return her cpap machine. Patient has barely used her cpap device, but when she has, her AHI is being corrected. I left patient a voicemail and informed her to call me to discuss the importance of using cpap, and if she still wants to proceed with returning her cpap afterwards, she can. I left my direct number.

## 2018-12-27 ENCOUNTER — DOCUMENTATION ONLY (OUTPATIENT)
Dept: SLEEP MEDICINE | Facility: CLINIC | Age: 38
End: 2018-12-27
Payer: COMMERCIAL

## 2018-12-27 NOTE — PROGRESS NOTES
Patient came as a walk in to [facility] to return her CPAP machine on December 27, 2018. Patient states has not been using, is having a hard time getting used to it. Informed patient that the machine has been converted to a purchase and is now hers. We did discuss importance of continued use. Patient has a phone call with Dr. Wooten on 12/31/18 to further discuss. I changed EPR to 3 and Response to Soft. Discussed also trial of different mask for comfort. Pt currently using Dreamwear FFM, states like that tubing is on top of head vs. Coming from nose.  Patient tried on the following masks: Dreamwear Nasal Patient selected a [employer]ci [employer] Mask name:Nasal mask size Medium.

## 2018-12-31 ENCOUNTER — VIRTUAL VISIT (OUTPATIENT)
Dept: SLEEP MEDICINE | Facility: CLINIC | Age: 38
End: 2018-12-31
Payer: COMMERCIAL

## 2018-12-31 DIAGNOSIS — G47.33 OSA (OBSTRUCTIVE SLEEP APNEA): Primary | ICD-10-CM

## 2018-12-31 PROCEDURE — 99441 ZZC PHYSICIAN TELEPHONE EVALUATION 5-10 MIN: CPT | Performed by: FAMILY MEDICINE

## 2018-12-31 NOTE — PROGRESS NOTES
"Shannon Quiroz is a 38 year old female who is being evaluated via a billable telephone visit.      The patient has been notified of following:     \"This telephone visit will be conducted via a call between you and your physician/provider. We have found that certain health care needs can be provided without the need for a physical exam.  This service lets us provide the care you need with a short phone conversation.  If a prescription is necessary we can send it directly to your pharmacy.  If lab work is needed we can place an order for that and you can then stop by our lab to have the test done at a later time.    If during the course of the call the physician/provider feels a telephone visit is not appropriate, you will not be charged for this service.\"     Consent has been obtained for this service by 1 care team member: yes. See the scanned image in the medical record.    Shannon Quiroz complains of    Chief Complaint   Patient presents with     Sleep Problem       I have reviewed and updated the patient's Past Medical History, Social History, Family History and Medication List.    ALLERGIES  Patient has no known allergies.    Hilda Hernandez CMA (MA signature)    Additional provider notes: Discussed alternative treatment options for severe MARJORIE.  Unable to tolerate CPAP.  Reviewed options of retrying CPAP / bilevel PAP, oral appliances, weight management.    Assessment/Plan:      I have reviewed the note as documented above.  This accurately captures the substance of my conversation with the patient.  Shannon    Total time of call between patient and provider was 10 minutes     Mikey Wooten MD, MD    "

## 2019-01-03 ENCOUNTER — DOCUMENTATION ONLY (OUTPATIENT)
Dept: SLEEP MEDICINE | Facility: CLINIC | Age: 39
End: 2019-01-03
Payer: COMMERCIAL

## 2019-01-24 ENCOUNTER — TELEPHONE (OUTPATIENT)
Dept: FAMILY MEDICINE | Facility: CLINIC | Age: 39
End: 2019-01-24

## 2019-01-24 NOTE — TELEPHONE ENCOUNTER
Panel Management Review      Patient has the following on her problem list:     Depression / Dysthymia review    Measure:  Needs PHQ-9 score of 4 or less during index window.  Administer PHQ-9 and if score is 5 or more, send encounter to provider for next steps.    5 - 7 month window range: 10/11/18-02/11/19    PHQ-9 SCORE 6/11/2018 7/26/2018   PHQ-9 Total Score 10 9       If PHQ-9 recheck is 5 or more, route to provider for next steps.    Patient is due for:  PHQ9      Composite cancer screening  Chart review shows that this patient is due/due soon for the following None  Summary:    Patient is due/failing the following:   PHQ9    Action needed:   Patient needs to do PHQ9.    Type of outreach:    Sent Xiam message.    Questions for provider review:    None                                                                                                                                    Rere Molina [facility]       Chart routed to none .

## 2019-01-30 DIAGNOSIS — F33.0 MAJOR DEPRESSIVE DISORDER, RECURRENT EPISODE, MILD (H): ICD-10-CM

## 2019-01-30 NOTE — TELEPHONE ENCOUNTER
PHQ-9 SCORE 6/11/2018 7/26/2018   PHQ-9 Total Score 10 9     MLfor pt to call back   Needs Phq9 done and confirm what dose she is taking , how is she doing, not responding to mychart note  Await call back   SHARMIN Andrew  Clinic  RN/Hema Garcia

## 2019-01-30 NOTE — TELEPHONE ENCOUNTER
"Requested Prescriptions   Pending Prescriptions Disp Refills     venlafaxine (EFFEXOR-XR) 37.5 MG 24 hr capsule [Pharmacy Med Name: VENLAFAXINE HCL ER 37.5MG CP24] 30 capsule 1    Last Written Prescription Date:  11/23/18  Last Fill Quantity: 30,  # refills: 1   Last office visit: 7/26/2018 with prescribing provider:  shyanne   Future Office Visit:     Sig: TAKE ONE CAPSULE BY MOUTH ONCE DAILY    Serotonin-Norepinephrine Reuptake Inhibitors  Failed - 1/30/2019  5:01 AM       Failed - PHQ-9 score of less than 5 in past 6 months    Please review last PHQ-9 score.          Failed - Normal serum creatinine on file in past 12 months    No lab results found.         Failed - Recent (6 mo) or future (30 days) visit within the authorizing provider's specialty    Patient had office visit in the last 6 months or has a visit in the next 30 days with authorizing provider or within the authorizing provider's specialty.  See \"Patient Info\" tab in inbasket, or \"Choose Columns\" in Meds & Orders section of the refill encounter.           Passed - Blood pressure under 140/90 in past 12 months    BP Readings from Last 3 Encounters:   08/30/18 124/89   07/26/18 128/74   06/11/18 (!) 138/100                Passed - Medication is active on med list       Passed - Patient is age 18 or older       Passed - No active pregnancy on record       Passed - No positive pregnancy test in past 12 months          "

## 2019-01-31 RX ORDER — VENLAFAXINE HYDROCHLORIDE 37.5 MG/1
CAPSULE, EXTENDED RELEASE ORAL
Qty: 15 CAPSULE | Refills: 0 | Status: SHIPPED | OUTPATIENT
Start: 2019-01-31 | End: 2019-02-21

## 2019-01-31 NOTE — TELEPHONE ENCOUNTER
Pt never returned call, short 2 weeks  RX sent with note to call office needs Phq9 done   SHARMIN Anderw  Clinic  RN/Hema Garcia

## 2019-02-20 ENCOUNTER — TELEPHONE (OUTPATIENT)
Dept: FAMILY MEDICINE | Facility: CLINIC | Age: 39
End: 2019-02-20

## 2019-02-20 DIAGNOSIS — F33.0 MAJOR DEPRESSIVE DISORDER, RECURRENT EPISODE, MILD (H): ICD-10-CM

## 2019-02-20 NOTE — TELEPHONE ENCOUNTER
pLease call patient back and do a PHQ-9. She is not reading her My Chart messages. Looks like this was prescribed by Dr Hoover. Thanks, Kimber Rowland RN

## 2019-02-20 NOTE — TELEPHONE ENCOUNTER
Pt states that she is returning our call  And was told she needs to have a PHQ9 done , please call her back.     Mimi Brunson, [place]

## 2019-02-21 RX ORDER — VENLAFAXINE HYDROCHLORIDE 37.5 MG/1
CAPSULE, EXTENDED RELEASE ORAL
Qty: 90 CAPSULE | Refills: 1 | Status: SHIPPED | OUTPATIENT
Start: 2019-02-21 | End: 2019-08-19

## 2019-02-21 ASSESSMENT — ANXIETY QUESTIONNAIRES
7. FEELING AFRAID AS IF SOMETHING AWFUL MIGHT HAPPEN: NOT AT ALL
6. BECOMING EASILY ANNOYED OR IRRITABLE: SEVERAL DAYS
GAD7 TOTAL SCORE: 1
1. FEELING NERVOUS, ANXIOUS, OR ON EDGE: NOT AT ALL
2. NOT BEING ABLE TO STOP OR CONTROL WORRYING: NOT AT ALL
3. WORRYING TOO MUCH ABOUT DIFFERENT THINGS: NOT AT ALL
5. BEING SO RESTLESS THAT IT IS HARD TO SIT STILL: NOT AT ALL

## 2019-02-21 ASSESSMENT — PATIENT HEALTH QUESTIONNAIRE - PHQ9
5. POOR APPETITE OR OVEREATING: NOT AT ALL
SUM OF ALL RESPONSES TO PHQ QUESTIONS 1-9: 2

## 2019-02-21 NOTE — TELEPHONE ENCOUNTER
PHQ-9 SCORE 6/11/2018 7/26/2018 2/21/2019   PHQ-9 Total Score 10 9 2     RAPHAEL-7 SCORE 6/11/2018 2/21/2019   Total Score 0 1       Patient completed questionnaires, please send refill to the [facility], medication pended.     nAa Feliz, CMA

## 2019-02-22 ASSESSMENT — ANXIETY QUESTIONNAIRES: GAD7 TOTAL SCORE: 1

## 2019-04-02 ENCOUNTER — TELEPHONE (OUTPATIENT)
Dept: PEDIATRICS | Facility: CLINIC | Age: 39
End: 2019-04-02

## 2019-04-02 DIAGNOSIS — Z20.828 EXPOSURE TO INFLUENZA: Primary | ICD-10-CM

## 2019-04-02 RX ORDER — OSELTAMIVIR PHOSPHATE 75 MG/1
75 CAPSULE ORAL DAILY
Qty: 10 CAPSULE | Refills: 0 | Status: SHIPPED | OUTPATIENT
Start: 2019-04-02 | End: 2019-10-04

## 2019-04-02 NOTE — TELEPHONE ENCOUNTER
RN Close Contact Influenza  ExposureTreatment Protocol    Shannon Quiroz, a 38 year old female, has been exposed to a known positive influenza case.      Close :child    ASSESSMENT/PLAN:   1.  Antiviral  treatment is being ordered by  Dr Romero             And is  indicated as per close contact guidelines.  Tamiflu is an antiviral prescription medicine that attacks the actual flu virus at its source, and helps stop it from spreading in your body.  Who can take Tamiflu1  Tamiflu is a prescription medicine used to treat the flu (influenza) in people 2 weeks of age and older who have had flu symptoms for no more than 2-3  days. Tamiflu can also reduce the chance of getting the flu in people 1 year and older. Fever 102-103,chills,  Cough , body aches, fatigue,   Flu Treatment and Prevention Dosing for   Adults and Teens 13 Years and Older  Treatment dosing for 5 days 75 mg twice daily   Prevention dosing for 10 days   75 mg once daily   If PEDS  Weight in kg --    Any kidney problems   CKD   no    Sore throat  denies  If yes  Must have strep test done      Patient must have NO symptoms of influenza -to get preventive     VO per Dr Nick De Los Santos RN

## 2019-04-24 ENCOUNTER — DOCUMENTATION ONLY (OUTPATIENT)
Dept: SLEEP MEDICINE | Facility: CLINIC | Age: 39
End: 2019-04-24
Payer: COMMERCIAL

## 2019-04-24 NOTE — PROGRESS NOTES
6 month [facility] Recheck Visit     Diagnostic AHI:  51.4 HST    Message left for patient to return call.     Assessment: Pt not meeting objective benchmarks for compliance.  Action plan: Waiting for patient to return call.   Pt to follow up per provider request.       Device type: Auto-CPAP  PAP settings: CPAP min 5.0 cm  H20     CPAP max 15.0 cm  H20    Objective measures: 14 day rolling measures         Objective measure goal  Compliance   Goal >70%  Leak   Goal < 24 lpm  AHI  Goal < 5  Usage  Goal >240

## 2019-05-24 ENCOUNTER — TELEPHONE (OUTPATIENT)
Dept: SLEEP MEDICINE | Facility: CLINIC | Age: 39
End: 2019-05-24

## 2019-05-24 DIAGNOSIS — G47.33 OSA (OBSTRUCTIVE SLEEP APNEA): Primary | ICD-10-CM

## 2019-05-24 NOTE — TELEPHONE ENCOUNTER
I mailed and emailed pt a copy of the referral. Called and LVM telling her that the order has been placed.    Yulissa Mon, CMA

## 2019-05-24 NOTE — TELEPHONE ENCOUNTER
Dr. Wooten- Pt is attempting to get an Oral Appliance based off your discussion from her virtual visit on 12/31/18, but no orders have been placed. Please place orders for an oral device.    Will call patient back as soon as orders have been placed.    Called and lvm for pt explaining the process.    Thank you,    Yulissa Mon, CMA

## 2019-08-19 DIAGNOSIS — F33.0 MAJOR DEPRESSIVE DISORDER, RECURRENT EPISODE, MILD (H): ICD-10-CM

## 2019-08-20 RX ORDER — VENLAFAXINE HYDROCHLORIDE 37.5 MG/1
CAPSULE, EXTENDED RELEASE ORAL
Qty: 30 CAPSULE | Refills: 0 | Status: SHIPPED | OUTPATIENT
Start: 2019-08-20 | End: 2019-09-22

## 2019-08-20 NOTE — TELEPHONE ENCOUNTER
"Requested Prescriptions   Pending Prescriptions Disp Refills     venlafaxine (EFFEXOR-XR) 37.5 MG 24 hr capsule [Pharmacy Med Name: VENLAFAXINE HCL ER 37.5MG CP24] 90 capsule 1     Sig: TAKE ONE CAPSULE BY MOUTH ONCE DAILY  Last Written Prescription Date:  02/21/2019 #90 x 1  Last filled 05/16/2019  Last office visit: 7/26/2018 SHELDON Maldonado   Future Office Visit:  None         Serotonin-Norepinephrine Reuptake Inhibitors  Failed - 8/19/2019  5:01 AM        Failed - Normal serum creatinine on file in past 12 months     No lab results found.          Failed - Recent (6 mo) or future (30 days) visit within the authorizing provider's specialty     Patient had office visit in the last 6 months or has a visit in the next 30 days with authorizing provider or within the authorizing provider's specialty.  See \"Patient Info\" tab in inbasket, or \"Choose Columns\" in Meds & Orders section of the refill encounter.            Passed - Blood pressure under 140/90 in past 12 months     BP Readings from Last 3 Encounters:   08/30/18 124/89   07/26/18 128/74   06/11/18 (!) 138/100                 Passed - PHQ-9 score of less than 5 in past 6 months     Please review last PHQ-9 score.   PHQ-9 SCORE 6/11/2018 7/26/2018 2/21/2019   PHQ-9 Total Score 10 9 2               Passed - Medication is active on med list        Passed - Patient is age 18 or older        Passed - No active pregnancy on record        Passed - No positive pregnancy test in past 12 months          "

## 2019-09-19 DIAGNOSIS — G43.829 MENSTRUAL MIGRAINE WITHOUT STATUS MIGRAINOSUS, NOT INTRACTABLE: ICD-10-CM

## 2019-09-19 NOTE — TELEPHONE ENCOUNTER
"Requested Prescriptions   Pending Prescriptions Disp Refills     SUMAtriptan (IMITREX) 100 MG tablet [Pharmacy Med Name: SUMATRIPTAN SUCC 100 MG TABLET] 16 tablet 5     Sig: TAKE ONE TAB BY MOUTH AT ONSET OF HEADACHE, MAY REPEAT ONCE IN 2 HOURS. MAX 200MG IN 24 HOURS  Last Written Prescription Date:  11/20/2018 #30 x 2  Last filled 09/19/2019 #16 x 5  Last office visit: 7/26/2018 SHELDON Maldonado    Note:(??)      Future Office Visit:   Next 5 appointments (look out 90 days)    Oct 04, 2019 10:00 AM CDT  PHYSICAL with Jennifer Maldonado,   [facility] ([facility] [address] [address] [phone]                Serotonin Agonists Failed - 9/19/2019 12:29 PM        Failed - Blood pressure under 140/90 in past 12 months     BP Readings from Last 3 Encounters:   08/30/18 124/89   07/26/18 128/74   06/11/18 (!) 138/100                 Failed - Serotonin Agonist request needs review.     Please review patient's record. If patient has had 8 or more treatments in the past month, please forward to provider.          Passed - Recent (12 mo) or future (30 days) visit within the authorizing provider's specialty     Patient had office visit in the last 12 months or has a visit in the next 30 days with authorizing provider or within the authorizing provider's specialty.  See \"Patient Info\" tab in inbasket, or \"Choose Columns\" in Meds & Orders section of the refill encounter.              Passed - Medication is active on med list        Passed - Patient is age 18 or older        Passed - No active pregnancy on record        Passed - No positive pregnancy test in past 12 months          "

## 2019-09-20 RX ORDER — SUMATRIPTAN 100 MG/1
TABLET, FILM COATED ORAL
Qty: 16 TABLET | Refills: 0 | Status: SHIPPED | OUTPATIENT
Start: 2019-09-20 | End: 2019-11-23

## 2019-09-20 NOTE — TELEPHONE ENCOUNTER
Routing refill request to provider for review/approval because:  Patient needs to be seen because it has been more than 1 year since last office visit.  Has upcoming appointment  Radha De Los Santos RN

## 2019-09-22 DIAGNOSIS — F33.0 MAJOR DEPRESSIVE DISORDER, RECURRENT EPISODE, MILD (H): ICD-10-CM

## 2019-09-23 NOTE — TELEPHONE ENCOUNTER
"Requested Prescriptions   Pending Prescriptions Disp Refills     venlafaxine (EFFEXOR-XR) 37.5 MG 24 hr capsule [Pharmacy Med Name: VENLAFAXINE HCL ER 37.5MG CP24] 30 capsule 0     Sig: TAKE ONE CAPSULE BY MOUTH ONCE DAILY (NEED TO BE SEEN IN CLINIC FOR FURTHER REFILLS)  Last Written Prescription Date:  08/20/2019 #30 x 0  Last filled 08/21/2019  Last office visit: 7/26/2018 SHELDON Maldonado     Future Office Visit:   Next 5 appointments (look out 90 days)    Oct 04, 2019 10:00 AM CDT  PHYSICAL with Jennifer Maldonado,   [facility] ([facility]) [address] [address] [phone]                Serotonin-Norepinephrine Reuptake Inhibitors  Failed - 9/22/2019  5:02 AM        Failed - Blood pressure under 140/90 in past 12 months     BP Readings from Last 3 Encounters:   08/30/18 124/89   07/26/18 128/74   06/11/18 (!) 138/100                 Failed - PHQ-9 score of less than 5 in past 6 months     Please review last PHQ-9 score.   PHQ-9 SCORE 6/11/2018 7/26/2018 2/21/2019   PHQ-9 Total Score 10 9 2               Failed - Normal serum creatinine on file in past 12 months     No lab results found.          Passed - Medication is active on med list        Passed - Patient is age 18 or older        Passed - No active pregnancy on record        Passed - No positive pregnancy test in past 12 months        Passed - Recent (6 mo) or future (30 days) visit within the authorizing provider's specialty     Patient had office visit in the last 6 months or has a visit in the next 30 days with authorizing provider or within the authorizing provider's specialty.  See \"Patient Info\" tab in inbasket, or \"Choose Columns\" in Meds & Orders section of the refill encounter.              "

## 2019-09-25 RX ORDER — VENLAFAXINE HYDROCHLORIDE 37.5 MG/1
CAPSULE, EXTENDED RELEASE ORAL
Qty: 30 CAPSULE | Refills: 0 | Status: SHIPPED | OUTPATIENT
Start: 2019-09-25 | End: 2019-10-23

## 2019-10-04 ENCOUNTER — OFFICE VISIT (OUTPATIENT)
Dept: FAMILY MEDICINE | Facility: CLINIC | Age: 39
End: 2019-10-04
Payer: COMMERCIAL

## 2019-10-04 VITALS
TEMPERATURE: 97.8 F | HEART RATE: 84 BPM | HEIGHT: 67 IN | SYSTOLIC BLOOD PRESSURE: 126 MMHG | BODY MASS INDEX: 40.65 KG/M2 | RESPIRATION RATE: 16 BRPM | WEIGHT: 259 LBS | DIASTOLIC BLOOD PRESSURE: 88 MMHG

## 2019-10-04 DIAGNOSIS — D50.9 IRON DEFICIENCY ANEMIA, UNSPECIFIED IRON DEFICIENCY ANEMIA TYPE: ICD-10-CM

## 2019-10-04 DIAGNOSIS — Z12.31 ENCOUNTER FOR SCREENING MAMMOGRAM FOR BREAST CANCER: ICD-10-CM

## 2019-10-04 DIAGNOSIS — N92.0 MENORRHAGIA WITH REGULAR CYCLE: ICD-10-CM

## 2019-10-04 DIAGNOSIS — Z00.00 ROUTINE GENERAL MEDICAL EXAMINATION AT A HEALTH CARE FACILITY: Primary | ICD-10-CM

## 2019-10-04 DIAGNOSIS — Z12.4 SCREENING FOR CERVICAL CANCER: ICD-10-CM

## 2019-10-04 DIAGNOSIS — Z12.83 SCREENING FOR SKIN CANCER: ICD-10-CM

## 2019-10-04 DIAGNOSIS — F33.0 MAJOR DEPRESSIVE DISORDER, RECURRENT EPISODE, MILD (H): ICD-10-CM

## 2019-10-04 DIAGNOSIS — Z11.51 SCREENING FOR HUMAN PAPILLOMAVIRUS: ICD-10-CM

## 2019-10-04 DIAGNOSIS — Z11.4 SCREENING FOR HIV (HUMAN IMMUNODEFICIENCY VIRUS): ICD-10-CM

## 2019-10-04 PROBLEM — Z87.42 HISTORY OF ABNORMAL CERVICAL PAP SMEAR: Status: ACTIVE | Noted: 2019-10-04

## 2019-10-04 LAB
ERYTHROCYTE [DISTWIDTH] IN BLOOD BY AUTOMATED COUNT: 12.8 % (ref 10–15)
FERRITIN SERPL-MCNC: 43 NG/ML (ref 12–150)
HCT VFR BLD AUTO: 42.6 % (ref 35–47)
HGB BLD-MCNC: 14.2 G/DL (ref 11.7–15.7)
MCH RBC QN AUTO: 28.6 PG (ref 26.5–33)
MCHC RBC AUTO-ENTMCNC: 33.3 G/DL (ref 31.5–36.5)
MCV RBC AUTO: 86 FL (ref 78–100)
PLATELET # BLD AUTO: 173 10E9/L (ref 150–450)
RBC # BLD AUTO: 4.97 10E12/L (ref 3.8–5.2)
TSH SERPL DL<=0.005 MIU/L-ACNC: 2.62 MU/L (ref 0.4–4)
WBC # BLD AUTO: 7.6 10E9/L (ref 4–11)

## 2019-10-04 PROCEDURE — 84443 ASSAY THYROID STIM HORMONE: CPT | Performed by: FAMILY MEDICINE

## 2019-10-04 PROCEDURE — 87389 HIV-1 AG W/HIV-1&-2 AB AG IA: CPT | Performed by: FAMILY MEDICINE

## 2019-10-04 PROCEDURE — 87624 HPV HI-RISK TYP POOLED RSLT: CPT | Performed by: FAMILY MEDICINE

## 2019-10-04 PROCEDURE — 36415 COLL VENOUS BLD VENIPUNCTURE: CPT | Performed by: FAMILY MEDICINE

## 2019-10-04 PROCEDURE — 99396 PREV VISIT EST AGE 40-64: CPT | Performed by: FAMILY MEDICINE

## 2019-10-04 PROCEDURE — 82728 ASSAY OF FERRITIN: CPT | Performed by: FAMILY MEDICINE

## 2019-10-04 PROCEDURE — G0145 SCR C/V CYTO,THINLAYER,RESCR: HCPCS | Performed by: FAMILY MEDICINE

## 2019-10-04 PROCEDURE — 85027 COMPLETE CBC AUTOMATED: CPT | Performed by: FAMILY MEDICINE

## 2019-10-04 PROCEDURE — 99213 OFFICE O/P EST LOW 20 MIN: CPT | Mod: 25 | Performed by: FAMILY MEDICINE

## 2019-10-04 RX ORDER — FERROUS SULFATE 325(65) MG
325 TABLET ORAL
COMMUNITY
End: 2022-09-15

## 2019-10-04 ASSESSMENT — ANXIETY QUESTIONNAIRES
5. BEING SO RESTLESS THAT IT IS HARD TO SIT STILL: NOT AT ALL
7. FEELING AFRAID AS IF SOMETHING AWFUL MIGHT HAPPEN: NOT AT ALL
2. NOT BEING ABLE TO STOP OR CONTROL WORRYING: NOT AT ALL
1. FEELING NERVOUS, ANXIOUS, OR ON EDGE: NOT AT ALL
GAD7 TOTAL SCORE: 2
6. BECOMING EASILY ANNOYED OR IRRITABLE: SEVERAL DAYS
3. WORRYING TOO MUCH ABOUT DIFFERENT THINGS: NOT AT ALL

## 2019-10-04 ASSESSMENT — PATIENT HEALTH QUESTIONNAIRE - PHQ9
SUM OF ALL RESPONSES TO PHQ QUESTIONS 1-9: 1
5. POOR APPETITE OR OVEREATING: SEVERAL DAYS

## 2019-10-04 ASSESSMENT — MIFFLIN-ST. JEOR: SCORE: 1869.51

## 2019-10-04 NOTE — PATIENT INSTRUCTIONS
For the periods:  We'll do the blood work we discussed today.  I would recommend a pelvic ultrasound as well.  You can call [phone] to get that scheduled    Please call [phone] for the mammogram as well.      Preventive Health Recommendations  Female Ages 40 to 49    Yearly exam:     See your health care provider every year in order to  1. Review health changes.   2. Discuss preventive care.    3. Review your medicines if your doctor prescribed any.      Get a Pap test every three years (unless you have an abnormal result and your provider advises testing more often).      If you get Pap tests with HPV test, you only need to test every 5 years, unless you have an abnormal result. You do not need a Pap test if your uterus was removed (hysterectomy) and you have not had cancer.      You should be tested each year for STDs (sexually transmitted diseases), if you're at risk.     Ask your doctor if you should have a mammogram.      Have a colonoscopy (test for colon cancer) if someone in your family has had colon cancer or polyps before age 50.       Have a cholesterol test every 5 years.       Have a diabetes test (fasting glucose) after age 45. If you are at risk for diabetes, you should have this test every 3 years.    Shots: Get a flu shot each year. Get a tetanus shot every 10 years.     Nutrition:     Eat at least 5 servings of fruits and vegetables each day.    Eat whole-grain bread, whole-wheat pasta and brown rice instead of white grains and rice.    Get adequate Calcium and Vitamin D.      Lifestyle    Exercise at least 150 minutes a week (an average of 30 minutes a day, 5 days a week). This will help you control your weight and prevent disease.    Limit alcohol to one drink per day.    No smoking.     Wear sunscreen to prevent skin cancer.    See your dentist every six months for an exam and cleaning.

## 2019-10-04 NOTE — PROGRESS NOTES
SUBJECTIVE:   CC: Shannon Quiroz is an 39 year old woman who presents for preventive health visit.     Healthy Habits:    Do you get at least three servings of calcium containing foods daily (dairy, green leafy vegetables, etc.)? yes    Amount of exercise or daily activities, outside of work: 4 day(s) per week    Problems taking medications regularly No    Medication side effects: No    Have you had an eye exam in the past two years? no    Do you see a dentist twice per year? yes    Do you have sleep apnea, excessive snoring or daytime drowsiness?yes      Concerns:  * recheck iron    Had low ferritin tested initially through sleep.  Feeling much better on supplementation.  Taking 1 iron supplement daily.    *  Has new diagnosis of severe sleep apnea.  Struggled to tolerate the mask.  Tried a mouth guard which she uses regularly now.  Seeing dentist for possibily a better fitting mouth guard.  Doing much better    *  Periods are very heavy.  Periods are about every 26-28 days.  Uses a menstrual cup and period panties.  Filling her cup within an hour on her heavy days.  Typically flowing for 5 days.  Periods have been this heavy since last child, worse over the last year.    Has had Mirena in the past and struggled with cramping and difficult removal.  Not interested in trial of IUD again    * derm referral   Sister and Mom have had issues with skin cancer.    Today's PHQ-2 Score:   PHQ-2 ( 1999 [employer]) 10/4/2019 2/21/2019   Q1: Little interest or pleasure in doing things 0 0   Q2: Feeling down, depressed or hopeless 0 0   PHQ-2 Score 0 0       Abuse: Current or Past(Physical, Sexual or Emotional)- No  Do you feel safe in your environment? Yes    Social History     Tobacco Use     Smoking status: Never Smoker     Smokeless tobacco: Never Used   Substance Use Topics     Alcohol use: No     If you drink alcohol do you typically have >3 drinks per day or >7 drinks per week? No                     Reviewed orders with  "patient.  Reviewed health maintenance and updated orders accordingly - Yes    Mammogram Screening: Patient under age 50, mutual decision reflected in health maintenance.      Pertinent mammograms are reviewed under the imaging tab.  History of abnormal Pap smear:   YES - updated in Problem List and Health Maintenance accordingly  Last 3 Pap Results:   PAP (no units)   Date Value   10/04/2019 NIL   08/06/2009 NIL     PAP / HPV 10/4/2019 8/6/2009   PAP NIL NIL     Reviewed and updated as needed this visit by clinical staff  Tobacco  Allergies  Meds  Med Hx  Surg Hx  Fam Hx  Soc Hx        Reviewed and updated as needed this visit by Provider  Tobacco  Allergies  Meds  Med Hx  Surg Hx  Fam Hx  Soc Hx       Past Medical History:   Diagnosis Date     Nephrolithiasis       Past Surgical History:   Procedure Laterality Date     CHOLECYSTECTOMY, LAPOROSCOPIC  6-1-09     SURGICAL HISTORY OF -   1/7/09    vaginal birth     SURGICAL HISTORY OF -   3/1/08    Kidney stone removed       ROS:  CONSTITUTIONAL: NEGATIVE for fever, chills, change in weight  INTEGUMENTARU/SKIN: NEGATIVE for worrisome rashes, moles or lesions  EYES: NEGATIVE for vision changes or irritation  ENT: NEGATIVE for ear, mouth and throat problems  RESP: NEGATIVE for significant cough or SOB  BREAST: NEGATIVE for masses, tenderness or discharge  CV: NEGATIVE for chest pain, palpitations or peripheral edema  GI: NEGATIVE for nausea, abdominal pain, heartburn, or change in bowel habits   female: as above  MUSCULOSKELETAL: NEGATIVE for significant arthralgias or myalgia  NEURO: NEGATIVE for weakness, dizziness or paresthesias  PSYCHIATRIC: NEGATIVE for changes in mood or affect    OBJECTIVE:   /88   Pulse 84   Temp 97.8  F (36.6  C) (Tympanic)   Resp 16   Ht 1.689 m (5' 6.5\")   Wt 117.5 kg (259 lb)   LMP 09/20/2019   Breastfeeding? No   BMI 41.18 kg/m    EXAM:  GENERAL: healthy, alert and no distress  EYES: Eyes grossly normal to " inspection, PERRL and conjunctivae and sclerae normal  HENT: ear canals and TM's normal, nose and mouth without ulcers or lesions  NECK: no adenopathy, no asymmetry, masses, or scars and thyroid normal to palpation  RESP: lungs clear to auscultation - no rales, rhonchi or wheezes  BREAST: normal without masses, tenderness or nipple discharge and no palpable axillary masses or adenopathy  CV: regular rate and rhythm, normal S1 S2, no S3 or S4, no murmur, click or rub, no peripheral edema and peripheral pulses strong  ABDOMEN: soft, nontender, no hepatosplenomegaly, no masses and bowel sounds normal   (female): normal female external genitalia, normal urethral meatus, vaginal mucosa pink, moist, well rugated, and normal cervix/adnexa/uterus without masses or discharge.  Exam limited by body habitus  MS: no gross musculoskeletal defects noted, no edema  NEURO: Normal strength and tone, mentation intact and speech normal  PSYCH: mentation appears normal, affect normal/bright    Diagnostic Test Results:  Labs reviewed in Epic    ASSESSMENT/PLAN:       ICD-10-CM    1. Routine general medical examination at a health care facility Z00.00    2. Iron deficiency anemia, unspecified iron deficiency anemia type D50.9 Ferritin     CBC with platelets   3. Menorrhagia with regular cycle N92.0 Ferritin     CBC with platelets     TSH with free T4 reflex     US Pelvic Complete with Transvaginal   4. Major depressive disorder, recurrent episode, mild (H) F33.0    5. Body mass index (BMI) 40.0-44.9, adult (H) Z68.41    6. Encounter for screening mammogram for breast cancer Z12.31 MA Screening Digital Bilateral   7. Screening for HIV (human immunodeficiency virus) Z11.4 HIV Screening   8. Screening for cervical cancer Z12.4 Pap imaged thin layer screen with HPV - recommended age 30 - 65 years (select HPV order below)   9. Screening for human papillomavirus Z11.51 HPV High Risk Types DNA Cervical   10. Screening for skin cancer Z12.83  "DERMATOLOGY REFERRAL     Menorrhagia:  Reviewed probable etiology and eval/management.  Plan labs today, pelvic ultrasound to eval for structural concern (fibroid/polyp).  Reviewed IUD (not interested) vs possible ablation.  Would consider      COUNSELING:   Reviewed preventive health counseling, as reflected in patient instructions  Special attention given to:        Regular exercise       Healthy diet/nutrition       Contraception       Osteoporosis Prevention/Bone Health       (Elisha)menopause management    Estimated body mass index is 41.18 kg/m  as calculated from the following:    Height as of this encounter: 1.689 m (5' 6.5\").    Weight as of this encounter: 117.5 kg (259 lb).    Weight management plan: Discussed healthy diet and exercise guidelines     reports that she has never smoked. She has never used smokeless tobacco.      Counseling Resources:  ATP IV Guidelines  Pooled Cohorts Equation Calculator  Breast Cancer Risk Calculator  FRAX Risk Assessment  ICSI Preventive Guidelines  Dietary Guidelines for Americans, 2010  [employer]'s MyPlate  ASA Prophylaxis  Lung CA Screening    Jennifer Maldonado, DO  [facility]    Patient Instructions   For the periods:  We'll do the blood work we discussed today.  I would recommend a pelvic ultrasound as well.  You can call [phone] to get that scheduled    Please call [phone] for the mammogram as well.      Preventive Health Recommendations  Female Ages 40 to 49    Yearly exam:     See your health care provider every year in order to  1. Review health changes.   2. Discuss preventive care.    3. Review your medicines if your doctor prescribed any.      Get a Pap test every three years (unless you have an abnormal result and your provider advises testing more often).      If you get Pap tests with HPV test, you only need to test every 5 years, unless you have an abnormal result. You do not need a Pap test if your uterus was removed (hysterectomy) and " you have not had cancer.      You should be tested each year for STDs (sexually transmitted diseases), if you're at risk.     Ask your doctor if you should have a mammogram.      Have a colonoscopy (test for colon cancer) if someone in your family has had colon cancer or polyps before age 50.       Have a cholesterol test every 5 years.       Have a diabetes test (fasting glucose) after age 45. If you are at risk for diabetes, you should have this test every 3 years.    Shots: Get a flu shot each year. Get a tetanus shot every 10 years.     Nutrition:     Eat at least 5 servings of fruits and vegetables each day.    Eat whole-grain bread, whole-wheat pasta and brown rice instead of white grains and rice.    Get adequate Calcium and Vitamin D.      Lifestyle    Exercise at least 150 minutes a week (an average of 30 minutes a day, 5 days a week). This will help you control your weight and prevent disease.    Limit alcohol to one drink per day.    No smoking.     Wear sunscreen to prevent skin cancer.    See your dentist every six months for an exam and cleaning.

## 2019-10-04 NOTE — NURSING NOTE
"Initial /88   Pulse 84   Temp 97.8  F (36.6  C) (Tympanic)   Resp 16   Ht 1.689 m (5' 6.5\")   Wt 117.5 kg (259 lb)   LMP 09/20/2019   Breastfeeding? No   BMI 41.18 kg/m   Estimated body mass index is 41.18 kg/m  as calculated from the following:    Height as of this encounter: 1.689 m (5' 6.5\").    Weight as of this encounter: 117.5 kg (259 lb). .      "

## 2019-10-05 LAB — HIV 1+2 AB+HIV1 P24 AG SERPL QL IA: NONREACTIVE

## 2019-10-05 ASSESSMENT — ANXIETY QUESTIONNAIRES: GAD7 TOTAL SCORE: 2

## 2019-10-09 LAB
COPATH REPORT: NORMAL
PAP: NORMAL

## 2019-10-11 LAB
FINAL DIAGNOSIS: NORMAL
HPV HR 12 DNA CVX QL NAA+PROBE: NEGATIVE
HPV16 DNA SPEC QL NAA+PROBE: NEGATIVE
HPV18 DNA SPEC QL NAA+PROBE: NEGATIVE
SPECIMEN DESCRIPTION: NORMAL
SPECIMEN SOURCE CVX/VAG CYTO: NORMAL

## 2019-10-23 ENCOUNTER — ANCILLARY PROCEDURE (OUTPATIENT)
Dept: ULTRASOUND IMAGING | Facility: CLINIC | Age: 39
End: 2019-10-23
Attending: FAMILY MEDICINE
Payer: COMMERCIAL

## 2019-10-23 DIAGNOSIS — F33.0 MAJOR DEPRESSIVE DISORDER, RECURRENT EPISODE, MILD (H): ICD-10-CM

## 2019-10-23 DIAGNOSIS — N92.0 MENORRHAGIA WITH REGULAR CYCLE: ICD-10-CM

## 2019-10-23 PROCEDURE — 76856 US EXAM PELVIC COMPLETE: CPT | Mod: 59

## 2019-10-23 PROCEDURE — 76830 TRANSVAGINAL US NON-OB: CPT

## 2019-10-23 NOTE — TELEPHONE ENCOUNTER
"Requested Prescriptions   Pending Prescriptions Disp Refills     venlafaxine (EFFEXOR-XR) 37.5 MG 24 hr capsule [Pharmacy Med Name: VENLAFAXINE HCL ER 37.5MG CP24]  Last Written Prescription Date:  9/25/19  Last Fill Quantity: 30,  # refills: 0   Last office visit: 10/4/2019 with prescribing provider:  shyanne   Future Office Visit:     30 capsule 0     Sig: TAKE ONE CAPSULE BY MOUTH ONCE DAILY (NEED TO BE SEEN IN CLINIC FOR FURTHER REFILLS)       Serotonin-Norepinephrine Reuptake Inhibitors  Failed - 10/23/2019  5:02 AM        Failed - Normal serum creatinine on file in past 12 months     No lab results found.          Passed - Blood pressure under 140/90 in past 12 months     BP Readings from Last 3 Encounters:   10/04/19 126/88   08/30/18 124/89   07/26/18 128/74                 Passed - PHQ-9 score of less than 5 in past 6 months     Please review last PHQ-9 score.           Passed - Medication is active on med list        Passed - Patient is age 18 or older        Passed - No active pregnancy on record        Passed - No positive pregnancy test in past 12 months        Passed - Recent (6 mo) or future (30 days) visit within the authorizing provider's specialty     Patient had office visit in the last 6 months or has a visit in the next 30 days with authorizing provider or within the authorizing provider's specialty.  See \"Patient Info\" tab in inbasket, or \"Choose Columns\" in Meds & Orders section of the refill encounter.              "

## 2019-10-24 RX ORDER — VENLAFAXINE HYDROCHLORIDE 37.5 MG/1
CAPSULE, EXTENDED RELEASE ORAL
Qty: 30 CAPSULE | Refills: 0 | Status: SHIPPED | OUTPATIENT
Start: 2019-10-24 | End: 2019-11-21

## 2019-10-25 ENCOUNTER — TELEPHONE (OUTPATIENT)
Dept: FAMILY MEDICINE | Facility: CLINIC | Age: 39
End: 2019-10-25

## 2019-10-25 DIAGNOSIS — N92.0 MENORRHAGIA WITH REGULAR CYCLE: Primary | ICD-10-CM

## 2019-10-25 DIAGNOSIS — R93.89 THICKENED ENDOMETRIUM: ICD-10-CM

## 2019-10-25 NOTE — TELEPHONE ENCOUNTER
called and spoke with pt regarding results.  Advised visit with GYN to consider biopsy and management.    Pt prefers Ruben Maldonado DO

## 2019-11-01 ENCOUNTER — OFFICE VISIT (OUTPATIENT)
Dept: OBGYN | Facility: CLINIC | Age: 39
End: 2019-11-01
Payer: COMMERCIAL

## 2019-11-01 VITALS
BODY MASS INDEX: 40.29 KG/M2 | HEART RATE: 76 BPM | SYSTOLIC BLOOD PRESSURE: 125 MMHG | WEIGHT: 253.4 LBS | DIASTOLIC BLOOD PRESSURE: 89 MMHG

## 2019-11-01 DIAGNOSIS — N92.0 MENORRHAGIA WITH REGULAR CYCLE: Primary | ICD-10-CM

## 2019-11-01 DIAGNOSIS — R93.89 ENDOMETRIAL THICKENING ON ULTRASOUND: ICD-10-CM

## 2019-11-01 PROCEDURE — 58100 BIOPSY OF UTERUS LINING: CPT | Performed by: OBSTETRICS & GYNECOLOGY

## 2019-11-01 PROCEDURE — 88305 TISSUE EXAM BY PATHOLOGIST: CPT | Performed by: OBSTETRICS & GYNECOLOGY

## 2019-11-01 PROCEDURE — 99243 OFF/OP CNSLTJ NEW/EST LOW 30: CPT | Mod: 25 | Performed by: OBSTETRICS & GYNECOLOGY

## 2019-11-01 NOTE — PROGRESS NOTES
Shannon is a 39 year old  referred here by Jennifer Maldonado for consultation regarding heavy menses and thickened endometrium on ultrasound as bellow.  Her menses are about every 25-28 days b ut heavy with clotts. She is on iron for anemia.  She has failed OCP, Implant and MIRENA IUD. The OCP and the Implants caused migraines. THe IUD lasted 2 months and caused lots of cramping with difficulty in removal..    Spouse has a vasectomy.for contraception.    We discussed the thickened endometrium and the need to R/O malignancy/endometrial hyperplasia.  She accepts to proceed with Endometrial biopsy today.      PELVIC ULTRASOUND WITH ENDOVAGINAL TRANSDUCER  10/23/2019 6:06 PM      HISTORY: Menorrhagia with regular cycle.     TECHNIQUE: Transabdominal and endovaginal sonography was performed.     COMPARISON: None.     FINDINGS: The uterus is normal in size measuring 8.3 x 5.7 x 7.2 cm.  The uterus is retroverted. The endometrium appears thickened at 2.8  cm. There are two small myometrial cysts in the left uterine body  measuring up to 0.8 cm. There are nabothian cysts in the cervix. The  ovaries are normal in size and appearance bilaterally. Color Doppler  and Doppler waveform analysis of the ovaries shows blood flow  bilaterally. No free pelvic fluid.                                                                      IMPRESSION:  1. Abnormally thickened endometrium measuring up to 2.8 cm.     ROS: Ten point review of systems was reviewed and negative except the above.    Gyne: - abn pap (last pap ), - STD's    Past Medical History:   Diagnosis Date     Nephrolithiasis      Past Surgical History:   Procedure Laterality Date     CHOLECYSTECTOMY, LAPOROSCOPIC  09     SURGICAL HISTORY OF -   09    vaginal birth     SURGICAL HISTORY OF -   3/1/08    Kidney stone removed     Patient Active Problem List   Diagnosis     Cholelithiasis without cholecystitis     Epigastric pain     Abdominal pain     CARDIOVASCULAR  SCREENING; LDL GOAL LESS THAN 160     Excessive daytime sleepiness     Weight gain     Major depression in complete remission (H)     Throat pain     Morbid obesity (H)     Major depressive disorder, recurrent episode, mild (H)     MARJORIE (obstructive sleep apnea)     History of abnormal cervical Pap smear       ALL/Meds: Her medication and allergy histories were reviewed and are documented in their appropriate chart areas.    SH: - tob, - EtOH,     FH: Her family history was reviewed and documented in its appropriate chart area.    PE: /89   Pulse 76   Wt 114.9 kg (253 lb 6.4 oz)   LMP 10/17/2019 (Exact Date)   Breastfeeding? No   BMI 40.29 kg/m    Body mass index is 40.29 kg/m .    General Appearance:  healthy, alert, active, no distress  HEENT: NCAT  Abdomen: Soft, nontender.  Normal bowel sounds.  No masses  Pelvic:       - Ext: NEFG,        - Urethra: no lesions, no masses, no hypermobility       - Urethral Meatus: normal appearance,        - Bladder: no tenderness, no masses       - Vagina: pink, moist, normal rugae, no lesions, no discharge       - Cervix: no lesions, parous       - Uterus: 9 wk sized, RV mobile, normal contour       - Adnexa: no masses, no tenderness       - Rectal: deferred,        - Pelvic support: no cystocele, no rectocele, no uterine prolapse    PROCEDURE:  Endometrial biopsy was discussed including the risks/benefits and complications.  She does consent.  The biopsy was performed without complication.  Cervix was prepped with betadine. It was not grasped with single-toothed tenaculum.  Pipelle sounded to 9 cm.  A representative sample was aspirated from the cavity and sent to pathology. The cervix was hemostatic. The patient tolerated the procedure well. .    Nurse present for exam and procedure.    A/P      ICD-10-CM    1. Menorrhagia with regular cycle N92.0 Surgical pathology exam     ENDOMETRIAL BIOPSY W/O CERVICAL DILATION   2. Endometrial thickening on ultrasound  R93.89 Surgical pathology exam     ENDOMETRIAL BIOPSY W/O CERVICAL DILATION         WE reviewed risks and benefits of medical versus surgical therapy.  Medical therapy reviewed included hormonal manipulation with OCP's, Patch, Ring, Depo, or IUD.   Reviewed endometrial ablation versus hysterectomy.  Discussed that endometrial ablation is minimally invasive compared to hysterectomy but may not be definitive.  Patient is considering an endometrial ablation if her biopsy results are normal.  She will be notified about results.  Reviewed R/B/A of surgery including but not limited to bleeding, infection, damage to other organs.  Reviewed pre and post op course. Patient to see PCP for pre-op evaluation and see me to review surgery as needed..  All questions answered.   ACOG pamphlets were provided on the above topics.      CEPHAS AGBEH, MD.

## 2019-11-01 NOTE — PATIENT INSTRUCTIONS
If you have any questions regarding your visit, Please contact your care team.  [employer]: [phone]  [facility] CLINIC HOURS TELEPHONE NUMBER   Cephas Agbeh, M.D. Lisa -       Alonso Leigh         Monday-[place]    8:00a.m-4:45 p.m    Tuesday--[place]     8:00a.m-4:45 p.m.    Thursday-[place]    8:00a.m-4:45 p.m.    Friday-[place]    8:00a.m-4:45 p.m    [facility]   [address] [place] [address]   [phone]-Ask for [facility]   Fax [phone]   Casrehg-[phone]     [facility]   [address] [place] [address] [place] [address]   [phone]    [facility]  [address] [address]  [phone]  Fuazwoy-[phone]   Urgent Care locations:    [facility] Monday-Friday  5 pm - 9 pm  Saturday and Sunday   9 am - 5 pm   Monday-Friday   5 pm - 9 pm  Saturday and Sunday  9 am - 5 pm    [phone]   If you need a medication refill, please contact your pharmacy. Please allow 3 business days for your refill to be completed.  As always, Thank you for trusting us with your healthcare needs!

## 2019-11-06 LAB — COPATH REPORT: NORMAL

## 2019-11-19 ENCOUNTER — OFFICE VISIT - HEALTHEAST (OUTPATIENT)
Dept: OBGYN | Facility: CLINIC | Age: 39
End: 2019-11-19

## 2019-11-19 ENCOUNTER — SURGERY - HEALTHEAST (OUTPATIENT)
Dept: OBGYN | Facility: CLINIC | Age: 39
End: 2019-11-19

## 2019-11-19 ENCOUNTER — TRANSFERRED RECORDS (OUTPATIENT)
Dept: HEALTH INFORMATION MANAGEMENT | Facility: CLINIC | Age: 39
End: 2019-11-19

## 2019-11-19 DIAGNOSIS — N92.0 MENORRHAGIA WITH REGULAR CYCLE: ICD-10-CM

## 2019-11-21 DIAGNOSIS — F33.0 MAJOR DEPRESSIVE DISORDER, RECURRENT EPISODE, MILD (H): ICD-10-CM

## 2019-11-21 NOTE — TELEPHONE ENCOUNTER
"Requested Prescriptions   Pending Prescriptions Disp Refills     venlafaxine (EFFEXOR-XR) 37.5 MG 24 hr capsule [Pharmacy Med Name: VENLAFAXINE HCL ER 37.5MG CP24] 30 capsule 0     Sig: TAKE ONE CAPSULE BY MOUTH ONCE DAILY  Last Written Prescription Date:  10/24/2019 #30 x 0  Last filled 10/28/2019  Last office visit: 10/4/2019 SHELDON Maldonado   Future Office Visit:  None         Serotonin-Norepinephrine Reuptake Inhibitors  Failed - 11/21/2019  5:03 AM        Failed - Normal serum creatinine on file in past 12 months     No lab results found.          Passed - Blood pressure under 140/90 in past 12 months     BP Readings from Last 3 Encounters:   11/01/19 125/89   10/04/19 126/88   08/30/18 124/89                 Passed - PHQ-9 score of less than 5 in past 6 months     Please review last PHQ-9 score.           Passed - Medication is active on med list        Passed - Patient is age 18 or older        Passed - No active pregnancy on record        Passed - No positive pregnancy test in past 12 months        Passed - Recent (6 mo) or future (30 days) visit within the authorizing provider's specialty     Patient had office visit in the last 6 months or has a visit in the next 30 days with authorizing provider or within the authorizing provider's specialty.  See \"Patient Info\" tab in inbasket, or \"Choose Columns\" in Meds & Orders section of the refill encounter.              "

## 2019-11-23 DIAGNOSIS — G43.829 MENSTRUAL MIGRAINE WITHOUT STATUS MIGRAINOSUS, NOT INTRACTABLE: ICD-10-CM

## 2019-11-25 ENCOUNTER — COMMUNICATION - HEALTHEAST (OUTPATIENT)
Dept: ADMINISTRATIVE | Facility: CLINIC | Age: 39
End: 2019-11-25

## 2019-11-26 RX ORDER — VENLAFAXINE HYDROCHLORIDE 37.5 MG/1
CAPSULE, EXTENDED RELEASE ORAL
Qty: 90 CAPSULE | Refills: 1 | Status: SHIPPED | OUTPATIENT
Start: 2019-11-26 | End: 2020-05-17

## 2019-11-26 RX ORDER — SUMATRIPTAN 100 MG/1
TABLET, FILM COATED ORAL
Qty: 16 TABLET | Refills: 0 | Status: SHIPPED | OUTPATIENT
Start: 2019-11-26 | End: 2020-03-09

## 2019-11-26 NOTE — TELEPHONE ENCOUNTER
Patient called back, she confirmed that she get 4-5 migraines per month    Prescription approved per [facility] Refill Protocol.    Mercedes Calixto RN

## 2019-11-26 NOTE — TELEPHONE ENCOUNTER
Left message on answering machine for patient to call back.    Patient Failed protocol for Imitrex: Please confirm with patient if she has had 8 or more treatments of migraines in the past month    Failed - Serotonin Agonist request needs review.         Please review patient's record. If patient has had 8 or more treatments in the past month, please forward to provider.     Mercedes Calixto RN

## 2019-11-26 NOTE — TELEPHONE ENCOUNTER
"Requested Prescriptions   Pending Prescriptions Disp Refills     SUMAtriptan (IMITREX) 100 MG tablet [Pharmacy Med Name: SUMATRIPTAN SUCC 100 MG TABLET] 16 tablet 0     Sig: TAKE ONE TAB BY MOUTH AT ONSET OF HEADACHE, MAY REPEAT ONCE IN 2 HOURS. MAX 200MG IN 24 HOURS  Last Written Prescription Date:  09/20/2019 #16 x 0  Last filled 09/20/2019  Last office visit: 10/4/2019 SHELDON Maldonado   Future Office Visit:  None         Serotonin Agonists Failed - 11/23/2019  2:01 PM        Failed - Serotonin Agonist request needs review.     Please review patient's record. If patient has had 8 or more treatments in the past month, please forward to provider.          Passed - Blood pressure under 140/90 in past 12 months     BP Readings from Last 3 Encounters:   11/01/19 125/89   10/04/19 126/88   08/30/18 124/89                 Passed - Recent (12 mo) or future (30 days) visit within the authorizing provider's specialty     Patient has had an office visit with the authorizing provider or a provider within the authorizing providers department within the previous 12 mos or has a future within next 30 days. See \"Patient Info\" tab in inbasket, or \"Choose Columns\" in Meds & Orders section of the refill encounter.              Passed - Medication is active on med list        Passed - Patient is age 18 or older        Passed - No active pregnancy on record        Passed - No positive pregnancy test in past 12 months          "

## 2019-11-27 ENCOUNTER — HOSPITAL ENCOUNTER (OUTPATIENT)
Dept: MAMMOGRAPHY | Facility: CLINIC | Age: 39
Discharge: HOME | End: 2019-11-27

## 2019-11-27 ENCOUNTER — TRANSFERRED RECORDS (OUTPATIENT)
Dept: HEALTH INFORMATION MANAGEMENT | Facility: CLINIC | Age: 39
End: 2019-11-27

## 2019-11-27 DIAGNOSIS — Z12.31 VISIT FOR SCREENING MAMMOGRAM: ICD-10-CM

## 2019-12-02 ENCOUNTER — COMMUNICATION - HEALTHEAST (OUTPATIENT)
Dept: ADMINISTRATIVE | Facility: CLINIC | Age: 39
End: 2019-12-02

## 2019-12-03 ENCOUNTER — AMBULATORY - HEALTHEAST (OUTPATIENT)
Dept: MIDWIFE SERVICES | Facility: CLINIC | Age: 39
End: 2019-12-03

## 2020-01-03 ENCOUNTER — ANESTHESIA - HEALTHEAST (OUTPATIENT)
Dept: SURGERY | Facility: AMBULATORY SURGERY CENTER | Age: 40
End: 2020-01-03

## 2020-01-03 ENCOUNTER — OFFICE VISIT (OUTPATIENT)
Dept: FAMILY MEDICINE | Facility: CLINIC | Age: 40
End: 2020-01-03
Payer: COMMERCIAL

## 2020-01-03 VITALS
HEART RATE: 75 BPM | WEIGHT: 258.4 LBS | OXYGEN SATURATION: 97 % | HEIGHT: 66 IN | RESPIRATION RATE: 14 BRPM | BODY MASS INDEX: 41.53 KG/M2 | SYSTOLIC BLOOD PRESSURE: 120 MMHG | DIASTOLIC BLOOD PRESSURE: 76 MMHG | TEMPERATURE: 97.7 F

## 2020-01-03 DIAGNOSIS — R93.89 THICKENED ENDOMETRIUM: ICD-10-CM

## 2020-01-03 DIAGNOSIS — N93.8 DUB (DYSFUNCTIONAL UTERINE BLEEDING): ICD-10-CM

## 2020-01-03 DIAGNOSIS — Z01.818 PREOP GENERAL PHYSICAL EXAM: Primary | ICD-10-CM

## 2020-01-03 LAB — HGB BLD-MCNC: 13.9 G/DL (ref 11.7–15.7)

## 2020-01-03 PROCEDURE — 85018 HEMOGLOBIN: CPT | Performed by: NURSE PRACTITIONER

## 2020-01-03 PROCEDURE — 99214 OFFICE O/P EST MOD 30 MIN: CPT | Performed by: NURSE PRACTITIONER

## 2020-01-03 PROCEDURE — 36415 COLL VENOUS BLD VENIPUNCTURE: CPT | Performed by: NURSE PRACTITIONER

## 2020-01-03 ASSESSMENT — MIFFLIN-ST. JEOR
SCORE: 1850.43
SCORE: 1858.84

## 2020-01-03 NOTE — PATIENT INSTRUCTIONS
Before Your Surgery      Call your surgeon if there is any change in your health. This includes signs of a cold or flu (such as a sore throat, runny nose, cough, rash or fever).    Do not smoke, drink alcohol or take over the counter medicine (unless your surgeon or primary care doctor tells you to) for the 24 hours before and after surgery.    If you take prescribed drugs: Follow your doctor s orders about which medicines to take and which to stop until after surgery.    Eating and drinking prior to surgery: follow the instructions from your surgeon    Take a shower or bath the night before surgery. Use the soap your surgeon gave you to gently clean your skin. If you do not have soap from your surgeon, use your regular soap. Do not shave or scrub the surgery site.  Wear clean pajamas and have clean sheets on your bed.     Be well rested and well hydrated the night before surgery     No medication on the AM of surgery

## 2020-01-03 NOTE — PROGRESS NOTES
[facility]  [address] [facility] [address]  [phone]  Dept: [phone]    PRE-OP EVALUATION:  Today's date: 1/3/2020    Shannon Quiroz (: 1980) presents for pre-operative evaluation assessment as requested by Dr. Georgia Deleon.  She requires evaluation and anesthesia risk assessment prior to undergoing surgery/procedure for treatment of  ABNORMAL THICKEN OF THE ENDOMETRIAL LINING   Proposed Surgery/ Procedure: HYSTEROSCOPY, WITH ENDOMETRIAL RADIOFREQUENCY ABLATION - NOVASURE   Date of Surgery/ Procedure: 2020  Time of Surgery/ Procedure: 0700 am  Hospital/Surgical Facility: [facility]  Fax number for surgical facility: [phone]  Telephone: [phone]  Primary Physician: Jennifer Maldonado  Type of Anesthesia Anticipated: to be determined    Patient has a Health Care Directive or Living Will:  NO    1. NO - Do you have a history of heart attack, stroke, stent, bypass or surgery on an artery in the head, neck, heart or legs?  2. NO - Do you ever have any pain or discomfort in your chest?  3. NO - Do you have a history of  Heart Failure?  4. NO - Are you troubled by shortness of breath when: walking on the level, up a slight hill or at night?  5. NO - Do you currently have a cold, bronchitis or other respiratory infection?     Norovirus on 2019 through 2019. Is feeling fine. No coughing   6. NO - Do you have a cough, shortness of breath or wheezing?  7. NO - Do you sometimes get pains in the calves of your legs when you walk?  8. NO - Do you or anyone in your family have previous history of blood clots?  9. NO - Do you or does anyone in your family have a serious bleeding problem such as prolonged bleeding following surgeries or cuts?  10. YES - Have you ever had problems with anemia or been told to take iron pills?    Anemia. Takes iron supplement.   Last  HGB 14.2   11. NO - Have you had any abnormal blood loss such as black, tarry or  bloody stools, or abnormal vaginal bleeding?  12. NO - Have you ever had a blood transfusion?  13. NO - Have you or any of your relatives ever had problems with anesthesia?  14. YES - Do you have sleep apnea, excessive snoring or daytime drowsiness?    HISTORY OF SLEEP APNEA. NO LONGER USES C PAP. ,  Is now using the mouth guard   15. NO - Do you have any prosthetic heart valves?  16. NO - Do you have prosthetic joints?  17. NO - Is there any chance that you may be pregnant?      HPI:     HPI related to upcoming procedure: ABNORMAL THICKEN OF THE ENDOMETRIAL LINING         See problem list for active medical problems.  Problems all longstanding and stable, except as noted/documented.  See ROS for pertinent symptoms related to these conditions.      MEDICAL HISTORY:     Patient Active Problem List    Diagnosis Date Noted     History of abnormal cervical Pap smear 10/04/2019     Priority: Medium     Patient reported one abnormal pap in her 20's, had colposcopy.  Pap smears have been normal since.       MARJORIE (obstructive sleep apnea) 09/28/2018     Priority: Medium     Severe MARJORIE with sleep-associated hypoxemia    HST performed 9/15/2018 with weight 258 lbs, AHI 51.4, baseline SpO2 94.9%, aparna SpO2 82%, SpO2 <= 88% of 30 minutes.       Major depressive disorder, recurrent episode, mild (H) 08/01/2018     Priority: Medium     Excessive daytime sleepiness 06/20/2018     Priority: Medium     Weight gain 06/20/2018     Priority: Medium     Major depression in complete remission (H) 06/20/2018     Priority: Medium     Throat pain 06/20/2018     Priority: Medium     Morbid obesity (H) 06/20/2018     Priority: Medium     CARDIOVASCULAR SCREENING; LDL GOAL LESS THAN 160 10/31/2010     Priority: Medium     Abdominal pain 08/10/2009     Priority: Medium     Cholelithiasis without cholecystitis 04/21/2009     Priority: Medium     IMO update changed this record. Please review for accuracy       Epigastric pain 04/21/2009      Priority: Medium      Past Medical History:   Diagnosis Date     Nephrolithiasis      Past Surgical History:   Procedure Laterality Date     CHOLECYSTECTOMY, LAPOROSCOPIC  6-1-09     SURGICAL HISTORY OF -   1/7/09    vaginal birth     SURGICAL HISTORY OF -   3/1/08    Kidney stone removed     Current Outpatient Medications   Medication Sig Dispense Refill     ferrous sulfate (FEROSUL) 325 (65 Fe) MG tablet Take 325 mg by mouth daily (with breakfast)       SUMAtriptan (IMITREX) 100 MG tablet TAKE ONE TAB BY MOUTH AT ONSET OF HEADACHE, MAY REPEAT ONCE IN 2 HOURS. MAX 200MG IN 24 HOURS 16 tablet 0     venlafaxine (EFFEXOR-XR) 37.5 MG 24 hr capsule TAKE ONE CAPSULE BY MOUTH ONCE DAILY 90 capsule 1     OTC products: no recent use of OTC ASA, NSAIDS or Steroids    No Known Allergies   Latex Allergy: NO    Social History     Tobacco Use     Smoking status: Never Smoker     Smokeless tobacco: Never Used   Substance Use Topics     Alcohol use: No     History   Drug Use Unknown       REVIEW OF SYSTEMS:   CONSTITUTIONAL: NEGATIVE for fever, chills, change in weight  EYES: NEGATIVE for vision changes or irritation and POSITIVE for corrected vision  ENT/MOUTH: NEGATIVE for ear, mouth and throat problems and does have 2 crowns in the molars   RESP: NEGATIVE for significant cough or SOB  CV: NEGATIVE for chest pain, palpitations or peripheral edema  GI: NEGATIVE for nausea, abdominal pain, heartburn, or change in bowel habits  : NEGATIVE for frequency, dysuria, or hematuria  MUSCULOSKELETAL: NEGATIVE for significant arthralgias or myalgia  NEURO: NEGATIVE for weakness, dizziness or paresthesias.   POSITIVE for migraines related to periods   ENDOCRINE: NEGATIVE for temperature intolerance, skin/hair changes  HEME: NEGATIVE for bleeding problems.  Anemia is corrected   PSYCHIATRIC: NEGATIVE for changes in mood or affect    EXAM:   /76 (BP Location: Left arm, Patient Position: Sitting, Cuff Size: Adult Large)   Pulse 75    "Temp 97.7  F (36.5  C) (Tympanic)   Resp 14   Ht 1.676 m (5' 6\")   Wt 117.2 kg (258 lb 6.4 oz)   LMP 12/16/2019   SpO2 97%   BMI 41.71 kg/m      GENERAL APPEARANCE: healthy, alert and no distress     EYES: Eyes grossly normal to inspection, PERRL and wears  Glasses      HENT: ear canals and TM's normal, nose and mouth without ulcers or lesions, oral mucous membranes moist, oropharynx clear and teeth in good repair      NECK: no adenopathy, no asymmetry, masses, or scars and thyroid normal to palpation     RESP: lungs clear to auscultation - no rales, rhonchi or wheezes     CV: regular rates and rhythm, normal S1 S2, no S3 or S4 and no murmur, click or rub     ABDOMEN:  soft, nontender, no HSM or masses and bowel sounds normal     MS: extremities normal- no gross deformities noted, no evidence of inflammation in joints, FROM in all extremities.     SKIN: no suspicious lesions or rashes     NEURO: Normal strength and tone, sensory exam grossly normal, mentation intact, speech normal, cranial nerves 2-12 intact, Romberg negative, rapid alternating movements normal and proprioception normal     PSYCH: mentation appears normal. and affect normal/bright     LYMPHATICS: No cervical adenopathy    DIAGNOSTICS:   EKG: Not indicated due to non-vascular surgery and low risk of event (age <65 and without cardiac risk factors)  Hemoglobin (indicated for history of anemia or procedure with significant blood loss such as tonsillectomy, major intraperitoneal surgery, vascular surgery, major spine surgery, total joint replacement) 13.9    Recent Labs   Lab Test 10/04/19  1101 08/30/18  1117   HGB 14.2 11.1*    218        IMPRESSION:   Reason for surgery/procedure: ABNORMAL THICKEN OF THE ENDOMETRIAL LINING       The proposed surgical procedure is considered LOW risk.    REVISED CARDIAC RISK INDEX  The patient has the following serious cardiovascular risks for perioperative complications such as (MI, PE, VFib and 3  AV " Block):  No serious cardiac risks  INTERPRETATION: 0 risks: Class I (very low risk - 0.4% complication rate)    The patient has the following additional risks for perioperative complications:    Morbid obesity    ASSESSMENT/PLAN:      ICD-10-CM    1. Preop general physical exam Z01.818    2. Thickened endometrium R93.89 Hemoglobin   3. DUB (dysfunctional uterine bleeding) N93.8 Hemoglobin             RECOMMENDATIONS:         {IMPORTANT - Medications:will hold medication    APPROVAL GIVEN to proceed with proposed procedure, without further diagnostic evaluation       Signed Electronically by: HANANE MYERS NP, APRN CNP    Copy of this evaluation report is provided to requesting physician.    [facility] Preop Guidelines    Revised Cardiac Risk Index

## 2020-01-06 ENCOUNTER — SURGERY - HEALTHEAST (OUTPATIENT)
Dept: SURGERY | Facility: AMBULATORY SURGERY CENTER | Age: 40
End: 2020-01-06

## 2020-02-24 ENCOUNTER — HEALTH MAINTENANCE LETTER (OUTPATIENT)
Age: 40
End: 2020-02-24

## 2020-03-07 DIAGNOSIS — G43.829 MENSTRUAL MIGRAINE WITHOUT STATUS MIGRAINOSUS, NOT INTRACTABLE: ICD-10-CM

## 2020-03-09 RX ORDER — SUMATRIPTAN 100 MG/1
TABLET, FILM COATED ORAL
Qty: 16 TABLET | Refills: 0 | Status: SHIPPED | OUTPATIENT
Start: 2020-03-09 | End: 2020-04-02

## 2020-03-09 NOTE — TELEPHONE ENCOUNTER
Prescription approved per [facility] Refill Protocol.  Radha De Los Santos RN

## 2020-03-09 NOTE — TELEPHONE ENCOUNTER
"Requested Prescriptions   Pending Prescriptions Disp Refills     SUMAtriptan (IMITREX) 100 MG tablet [Pharmacy Med Name: SUMATRIPTAN SUCC 100 MG TABLET] 16 tablet 0     Sig: TAKE ONE TAB BY MOUTH AT ONSET OF HEADACHE, MAY REPEAT ONCE IN 2 HOURS. MAX 200MG IN 24 HOURS  Last Written Prescription Date:  11/26/2019 #16 x 0  Last filled 11/26/2019  Last office visit: 1/3/2020 SHELDON Pro   Future Office Visit:  None         Serotonin Agonists Failed - 3/7/2020  4:26 AM        Failed - Serotonin Agonist request needs review.     Please review patient's record. If patient has had 8 or more treatments in the past month, please forward to provider.          Passed - Blood pressure under 140/90 in past 12 months     BP Readings from Last 3 Encounters:   01/03/20 120/76   11/01/19 125/89   10/04/19 126/88                 Passed - Recent (12 mo) or future (30 days) visit within the authorizing provider's specialty     Patient has had an office visit with the authorizing provider or a provider within the authorizing providers department within the previous 12 mos or has a future within next 30 days. See \"Patient Info\" tab in inbasket, or \"Choose Columns\" in Meds & Orders section of the refill encounter.              Passed - Medication is active on med list        Passed - Patient is age 18 or older        Passed - No active pregnancy on record        Passed - No positive pregnancy test in past 12 months             "

## 2020-04-02 DIAGNOSIS — G43.829 MENSTRUAL MIGRAINE WITHOUT STATUS MIGRAINOSUS, NOT INTRACTABLE: ICD-10-CM

## 2020-04-02 RX ORDER — SUMATRIPTAN 100 MG/1
TABLET, FILM COATED ORAL
Qty: 16 TABLET | Refills: 3 | Status: SHIPPED | OUTPATIENT
Start: 2020-04-02 | End: 2020-11-05

## 2020-04-02 NOTE — TELEPHONE ENCOUNTER
"Requested Prescriptions   Pending Prescriptions Disp Refills     SUMAtriptan (IMITREX) 100 MG tablet [Pharmacy Med Name: SUMATRIPTAN SUCC 100 MG TABLET] 16 tablet 0     Sig: TAKE ONE TAB BY MOUTH AT ONSET OF HEADACHE, MAY REPEAT ONCE IN 2 HOURS. MAX 200MG IN 24 HOURS       Serotonin Agonists Failed - 4/2/2020 12:21 AM        Failed - Serotonin Agonist request needs review.     Please review patient's record. If patient has had 8 or more treatments in the past month, please forward to provider.          Passed - Blood pressure under 140/90 in past 12 months     BP Readings from Last 3 Encounters:   01/03/20 120/76   11/01/19 125/89   10/04/19 126/88                 Passed - Recent (12 mo) or future (30 days) visit within the authorizing provider's specialty     Patient has had an office visit with the authorizing provider or a provider within the authorizing providers department within the previous 12 mos or has a future within next 30 days. See \"Patient Info\" tab in inbasket, or \"Choose Columns\" in Meds & Orders section of the refill encounter.              Passed - Medication is active on med list        Passed - Patient is age 18 or older        Passed - No active pregnancy on record        Passed - No positive pregnancy test in past 12 months             "

## 2020-09-17 ENCOUNTER — IMMUNIZATION (OUTPATIENT)
Dept: FAMILY MEDICINE | Facility: CLINIC | Age: 40
End: 2020-09-17
Payer: COMMERCIAL

## 2020-09-17 PROCEDURE — 90686 IIV4 VACC NO PRSV 0.5 ML IM: CPT

## 2020-09-17 PROCEDURE — 90471 IMMUNIZATION ADMIN: CPT

## 2020-10-22 ENCOUNTER — TELEPHONE (OUTPATIENT)
Dept: FAMILY MEDICINE | Facility: CLINIC | Age: 40
End: 2020-10-22

## 2020-10-22 NOTE — TELEPHONE ENCOUNTER
Reason for call:  Patient reporting a symptom    Symptom or request: Melly has hx of migraines and usually takes imitrex 100mg and it has always worked for her.  Has been on it for years.  Her migraines always are in the same spot on left side of head.  This headache is different, started close to same spot but now it's all across her forehead, which never has happened.  Imitrex is not touching this headache so she is wondering if something else is going on.  Please call and assess. Thank you..Elissa Adler    Duration (how long have symptoms been present): started on Friday 10/16/20    Have you been treated for this before? No not this headache    Phone Number patient can be reached at:  Home number on file [phone] (home)    Best Time:  Any time    Can we leave a detailed message on this number:  YES    Call taken on 10/22/2020 at 1:59 PM by Elissa Adler

## 2020-10-23 NOTE — TELEPHONE ENCOUNTER
Spoke with patient regarding her headache, duration of headache since Saturday 10/17.  Reports taking Imitrex multiple times but does the headache does not go away, states this is not a migraine but more of a dull headache that will not go away and is in complete different location than her migraines.  Reports she generally gets migraines when ovulating or with her menstrual cycle, will take imitrex and they go away.    Denies being exposed to covid-that she is aware of   Offered Same day appointment at [facility], declined. Advised to go to UC or ER.   Did discuss possible symptom of Covid,advised [email or web address], declined.  [email or web address] information where she call for appointment  LOV: 10/4/2019 with Joel.  Appointment scheduled with Dr. Maldonado on Oct. 29.    Mercedes Calixto RN

## 2020-11-05 DIAGNOSIS — G43.829 MENSTRUAL MIGRAINE WITHOUT STATUS MIGRAINOSUS, NOT INTRACTABLE: ICD-10-CM

## 2020-11-05 NOTE — TELEPHONE ENCOUNTER
Pt has called and had to reschedule a appt today as her PCP, was changed to Virtual.  Would like a refill of this medication.    Renetta Farah  [facility] [place] [facility]

## 2020-11-06 RX ORDER — SUMATRIPTAN 100 MG/1
TABLET, FILM COATED ORAL
Qty: 16 TABLET | Refills: 3 | Status: SHIPPED | OUTPATIENT
Start: 2020-11-06 | End: 2021-04-07

## 2020-11-30 DIAGNOSIS — F33.0 MAJOR DEPRESSIVE DISORDER, RECURRENT EPISODE, MILD (H): ICD-10-CM

## 2020-11-30 RX ORDER — VENLAFAXINE HYDROCHLORIDE 37.5 MG/1
CAPSULE, EXTENDED RELEASE ORAL
Qty: 30 CAPSULE | Refills: 0 | Status: SHIPPED | OUTPATIENT
Start: 2020-11-30 | End: 2021-01-11

## 2020-11-30 NOTE — TELEPHONE ENCOUNTER
Medication is being filled for 1 time refill of 30 only due to:  Patient has 12/14/20 appointment with Dr. Maldonado.  Sj Chew RN

## 2020-12-13 ENCOUNTER — HEALTH MAINTENANCE LETTER (OUTPATIENT)
Age: 40
End: 2020-12-13

## 2021-04-05 DIAGNOSIS — G43.829 MENSTRUAL MIGRAINE WITHOUT STATUS MIGRAINOSUS, NOT INTRACTABLE: ICD-10-CM

## 2021-04-05 NOTE — TELEPHONE ENCOUNTER
Routing refill request to provider for review/approval because:  Blood pressure parameters not met per RN protocol; last documented BP > year.     Ancelmo Moe RN

## 2021-04-07 RX ORDER — SUMATRIPTAN 100 MG/1
TABLET, FILM COATED ORAL
Qty: 16 TABLET | Refills: 0 | Status: SHIPPED | OUTPATIENT
Start: 2021-04-07 | End: 2021-04-27

## 2021-04-11 DIAGNOSIS — F33.0 MAJOR DEPRESSIVE DISORDER, RECURRENT EPISODE, MILD (H): ICD-10-CM

## 2021-04-12 RX ORDER — VENLAFAXINE HYDROCHLORIDE 37.5 MG/1
CAPSULE, EXTENDED RELEASE ORAL
Qty: 30 CAPSULE | Refills: 0 | Status: SHIPPED | OUTPATIENT
Start: 2021-04-12 | End: 2021-04-27

## 2021-04-12 NOTE — TELEPHONE ENCOUNTER
"Has appointment scheduled for 4/27/21. Has canceled the past 4 appointments.       Requested Prescriptions   Pending Prescriptions Disp Refills     venlafaxine (EFFEXOR-XR) 37.5 MG 24 hr capsule [Pharmacy Med Name: VENLAFAXINE HCL ER 37.5MG CP24] 30 capsule 0     Sig: TAKE ONE CAPSULE BY MOUTH ONCE DAILY (NEED TO BE SEEN IN CLINIC FOR FURTHER REFILLS)       Serotonin-Norepinephrine Reuptake Inhibitors  Failed - 4/11/2021  5:01 AM        Failed - Blood pressure under 140/90 in past 12 months     BP Readings from Last 3 Encounters:   01/03/20 120/76   11/01/19 125/89   10/04/19 126/88                 Failed - PHQ-9 score of less than 5 in past 6 months     Please review last PHQ-9 score.           Failed - Normal serum creatinine on file in past 12 months     No lab results found.    Ok to refill medication if creatinine is low          Passed - Medication is active on med list        Passed - Patient is age 18 or older        Passed - No active pregnancy on record        Passed - No positive pregnancy test in past 12 months        Passed - Recent (6 mo) or future (30 days) visit within the authorizing provider's specialty     Patient had office visit in the last 6 months or has a visit in the next 30 days with authorizing provider or within the authorizing provider's specialty.  See \"Patient Info\" tab in inbasket, or \"Choose Columns\" in Meds & Orders section of the refill encounter.                 "

## 2021-04-27 ENCOUNTER — OFFICE VISIT (OUTPATIENT)
Dept: FAMILY MEDICINE | Facility: CLINIC | Age: 41
End: 2021-04-27
Payer: COMMERCIAL

## 2021-04-27 ENCOUNTER — RECORDS - HEALTHEAST (OUTPATIENT)
Dept: ADMINISTRATIVE | Facility: OTHER | Age: 41
End: 2021-04-27

## 2021-04-27 VITALS
DIASTOLIC BLOOD PRESSURE: 84 MMHG | BODY MASS INDEX: 40.21 KG/M2 | WEIGHT: 250.2 LBS | TEMPERATURE: 98.2 F | HEART RATE: 72 BPM | SYSTOLIC BLOOD PRESSURE: 120 MMHG | HEIGHT: 66 IN

## 2021-04-27 DIAGNOSIS — Z00.00 ROUTINE GENERAL MEDICAL EXAMINATION AT A HEALTH CARE FACILITY: Primary | ICD-10-CM

## 2021-04-27 DIAGNOSIS — Z13.6 CARDIOVASCULAR SCREENING; LDL GOAL LESS THAN 130: ICD-10-CM

## 2021-04-27 DIAGNOSIS — F33.0 MAJOR DEPRESSIVE DISORDER, RECURRENT EPISODE, MILD (H): ICD-10-CM

## 2021-04-27 DIAGNOSIS — Z11.59 ENCOUNTER FOR HEPATITIS C SCREENING TEST FOR LOW RISK PATIENT: ICD-10-CM

## 2021-04-27 DIAGNOSIS — Z12.31 ENCOUNTER FOR SCREENING MAMMOGRAM FOR BREAST CANCER: ICD-10-CM

## 2021-04-27 DIAGNOSIS — G43.829 MENSTRUAL MIGRAINE WITHOUT STATUS MIGRAINOSUS, NOT INTRACTABLE: ICD-10-CM

## 2021-04-27 PROBLEM — R63.5 WEIGHT GAIN: Status: RESOLVED | Noted: 2018-06-20 | Resolved: 2021-04-27

## 2021-04-27 PROCEDURE — 99396 PREV VISIT EST AGE 40-64: CPT | Performed by: FAMILY MEDICINE

## 2021-04-27 RX ORDER — SUMATRIPTAN 100 MG/1
TABLET, FILM COATED ORAL
Qty: 16 TABLET | Refills: 3 | Status: SHIPPED | OUTPATIENT
Start: 2021-04-27 | End: 2022-01-28

## 2021-04-27 RX ORDER — VENLAFAXINE HYDROCHLORIDE 37.5 MG/1
CAPSULE, EXTENDED RELEASE ORAL
Qty: 90 CAPSULE | Refills: 1 | Status: SHIPPED | OUTPATIENT
Start: 2021-04-27 | End: 2021-11-17

## 2021-04-27 ASSESSMENT — ANXIETY QUESTIONNAIRES
1. FEELING NERVOUS, ANXIOUS, OR ON EDGE: MORE THAN HALF THE DAYS
7. FEELING AFRAID AS IF SOMETHING AWFUL MIGHT HAPPEN: SEVERAL DAYS
2. NOT BEING ABLE TO STOP OR CONTROL WORRYING: SEVERAL DAYS
3. WORRYING TOO MUCH ABOUT DIFFERENT THINGS: SEVERAL DAYS
5. BEING SO RESTLESS THAT IT IS HARD TO SIT STILL: NOT AT ALL
6. BECOMING EASILY ANNOYED OR IRRITABLE: MORE THAN HALF THE DAYS
GAD7 TOTAL SCORE: 8

## 2021-04-27 ASSESSMENT — PATIENT HEALTH QUESTIONNAIRE - PHQ9
SUM OF ALL RESPONSES TO PHQ QUESTIONS 1-9: 0
5. POOR APPETITE OR OVEREATING: SEVERAL DAYS

## 2021-04-27 ASSESSMENT — MIFFLIN-ST. JEOR: SCORE: 1812.68

## 2021-04-27 NOTE — PROGRESS NOTES
SUBJECTIVE:   CC: Shannon Quiroz is an 40 year old woman who presents for preventive health visit.       Patient has been advised of split billing requirements and indicates understanding: Yes     Healthy Habits:    Do you get at least three servings of calcium containing foods daily (dairy, green leafy vegetables, etc.)? yes    Amount of exercise or daily activities, outside of work: 3-4 day(s) per week    Problems taking medications regularly No    Medication side effects: No    Have you had an eye exam in the past two years? no    Do you see a dentist twice per year? yes    Do you have sleep apnea, excessive snoring or daytime drowsiness?yes      No concerns    Feels mood is controlled.    Migraines have been stable, generally 4-5 per month around her menses.  At the beginning and end.  Generally uses imitrex with every headache.  This resolves them completely.  Generally uses 6 imitrex per month.      Has lost 15 pounds since Jan.    Today's PHQ-2 Score:   PHQ-2 ( 1999 [employer]) 10/28/2020 10/4/2019   Q1: Little interest or pleasure in doing things 0 0   Q2: Feeling down, depressed or hopeless 0 0   PHQ-2 Score 0 0   Q1: Little interest or pleasure in doing things Not at all -   Q2: Feeling down, depressed or hopeless Not at all -   PHQ-2 Score 0 -       Abuse: Current or Past(Physical, Sexual or Emotional)- No  Do you feel safe in your environment? Yes    Have you ever done Advance Care Planning? (For example, a Health Directive, POLST, or a discussion with a medical provider or your loved ones about your wishes): no    Social History     Tobacco Use     Smoking status: Never Smoker     Smokeless tobacco: Never Used   Substance Use Topics     Alcohol use: No     If you drink alcohol do you typically have >3 drinks per day or >7 drinks per week? No                     Reviewed orders with patient.  Reviewed health maintenance and updated orders accordingly - Yes      FSH-7: No flowsheet data  "found.    Mammogram Screening - Offered annual screening and updated Health Maintenance for mutual plan based on risk factor consideration    Pertinent mammograms are reviewed under the imaging tab.    Pertinent mammograms are reviewed under the imaging tab.  History of abnormal Pap smear: YES - updated in Problem List and Health Maintenance accordingly  PAP / HPV Latest Ref Rng & Units 10/4/2019 8/6/2009   PAP - NIL NIL   HPV 16 DNA NEG:Negative Negative -   HPV 18 DNA NEG:Negative Negative -   OTHER HR HPV NEG:Negative Negative -     Reviewed and updated as needed this visit by clinical staff  Tobacco  Allergies  Meds   Med Hx  Surg Hx  Fam Hx  Soc Hx        Reviewed and updated as needed this visit by Provider  Tobacco  Allergies  Meds   Med Hx  Surg Hx  Fam Hx  Soc Hx       Past Medical History:   Diagnosis Date     Nephrolithiasis       Past Surgical History:   Procedure Laterality Date     CHOLECYSTECTOMY, LAPOROSCOPIC  6-1-09     SURGICAL HISTORY OF -   1/7/09    vaginal birth     SURGICAL HISTORY OF -   3/1/08    Kidney stone removed       ROS:  CONSTITUTIONAL: NEGATIVE for fever, chills, change in weight  INTEGUMENTARU/SKIN: NEGATIVE for worrisome rashes, moles or lesions  EYES: NEGATIVE for vision changes or irritation  ENT: NEGATIVE for ear, mouth and throat problems  RESP: NEGATIVE for significant cough or SOB  BREAST: NEGATIVE for masses, tenderness or discharge  CV: NEGATIVE for chest pain, palpitations or peripheral edema  GI: NEGATIVE for nausea, abdominal pain, heartburn, or change in bowel habits  : NEGATIVE for unusual urinary or vaginal symptoms. Periods are regular.  MUSCULOSKELETAL: NEGATIVE for significant arthralgias or myalgia  NEURO: NEGATIVE for weakness, dizziness or paresthesias  PSYCHIATRIC: NEGATIVE for changes in mood or affect    OBJECTIVE:   /84   Pulse 72   Temp 98.2  F (36.8  C) (Tympanic)   Ht 1.67 m (5' 5.75\")   Wt 113.5 kg (250 lb 3.2 oz)   LMP " 04/14/2021   Breastfeeding No   BMI 40.69 kg/m    EXAM:  GENERAL: healthy, alert and no distress  EYES: Eyes grossly normal to inspection, PERRL and conjunctivae and sclerae normal  HENT: normal cephalic/atraumatic and ear canals and TM's normal  NECK: no adenopathy, no asymmetry, masses, or scars and thyroid normal to palpation  RESP: lungs clear to auscultation - no rales, rhonchi or wheezes  CV: regular rate and rhythm, normal S1 S2, no S3 or S4, no murmur, click or rub, no peripheral edema and peripheral pulses strong  ABDOMEN: soft, nontender, no hepatosplenomegaly, no masses and bowel sounds normal  MS: no gross musculoskeletal defects noted, no edema  NEURO: Normal strength and tone, mentation intact and speech normal  PSYCH: mentation appears normal, affect normal/bright    Diagnostic Test Results:  Labs reviewed in Epic    ASSESSMENT/PLAN:       ICD-10-CM    1. Routine general medical examination at a health care facility  Z00.00    2. Major depressive disorder, recurrent episode, mild (H)  F33.0 venlafaxine (EFFEXOR-XR) 37.5 MG 24 hr capsule   3. Menstrual migraine without status migrainosus, not intractable  G43.829 SUMAtriptan (IMITREX) 100 MG tablet   4. Body mass index (BMI) of 40.0 to 44.9 in adult (H)  Z68.41    5. Encounter for screening mammogram for breast cancer  Z12.31 *MA Screening Digital Bilateral   6. CARDIOVASCULAR SCREENING; LDL GOAL LESS THAN 130  Z13.6 Lipid panel reflex to direct LDL Fasting   7. Encounter for hepatitis C screening test for low risk patient  Z11.59 **Hepatitis C Screen Reflex to RNA FUTURE anytime     Reviewed weight management.  Pt has been successful with diet and exercise, 15# down since Jan  Support and encouragement given    Patient has been advised of split billing requirements and indicates understanding: Yes  COUNSELING:   Reviewed preventive health counseling, as reflected in patient instructions    Estimated body mass index is 40.69 kg/m  as calculated from  "the following:    Height as of this encounter: 1.67 m (5' 5.75\").    Weight as of this encounter: 113.5 kg (250 lb 3.2 oz).    Weight management plan: Discussed healthy diet and exercise guidelines    She reports that she has never smoked. She has never used smokeless tobacco.      Counseling Resources:  ATP IV Guidelines  Pooled Cohorts Equation Calculator  Breast Cancer Risk Calculator  BRCA-Related Cancer Risk Assessment: FHS-7 Tool  FRAX Risk Assessment  ICSI Preventive Guidelines  Dietary Guidelines for Americans, 2010  USDA's MyPlate  ASA Prophylaxis  Lung CA Screening    Jennifer Maldonado DO  [facility]  "

## 2021-04-27 NOTE — PATIENT INSTRUCTIONS
Please return for fasting blood work when you are able.  You will need to be fasting for 12 hours (nothing but water) prior to your blood work.    You can call to schedule your mammogram whenever it works best for you.      Preventive Health Recommendations  Female Ages 40 to 49    Yearly exam:     See your health care provider every year in order to  1. Review health changes.   2. Discuss preventive care.    3. Review your medicines if your doctor prescribed any.      Get a Pap test every three years (unless you have an abnormal result and your provider advises testing more often).      If you get Pap tests with HPV test, you only need to test every 5 years, unless you have an abnormal result. You do not need a Pap test if your uterus was removed (hysterectomy) and you have not had cancer.      You should be tested each year for STDs (sexually transmitted diseases), if you're at risk.     Ask your doctor if you should have a mammogram.      Have a colonoscopy (test for colon cancer) if someone in your family has had colon cancer or polyps before age 50.       Have a cholesterol test every 5 years.       Have a diabetes test (fasting glucose) after age 45. If you are at risk for diabetes, you should have this test every 3 years.    Shots: Get a flu shot each year. Get a tetanus shot every 10 years.     Nutrition:     Eat at least 5 servings of fruits and vegetables each day.    Eat whole-grain bread, whole-wheat pasta and brown rice instead of white grains and rice.    Get adequate Calcium and Vitamin D.      Lifestyle    Exercise at least 150 minutes a week (an average of 30 minutes a day, 5 days a week). This will help you control your weight and prevent disease.    Limit alcohol to one drink per day.    No smoking.     Wear sunscreen to prevent skin cancer.    See your dentist every six months for an exam and cleaning.

## 2021-04-27 NOTE — NURSING NOTE
"Initial /84   Pulse 72   Temp 98.2  F (36.8  C) (Tympanic)   Ht 1.67 m (5' 5.75\")   Wt 113.5 kg (250 lb 3.2 oz)   LMP 04/14/2021   Breastfeeding No   BMI 40.69 kg/m   Estimated body mass index is 40.69 kg/m  as calculated from the following:    Height as of this encounter: 1.67 m (5' 5.75\").    Weight as of this encounter: 113.5 kg (250 lb 3.2 oz). .      "

## 2021-04-28 ASSESSMENT — ANXIETY QUESTIONNAIRES: GAD7 TOTAL SCORE: 8

## 2021-05-25 ENCOUNTER — RECORDS - HEALTHEAST (OUTPATIENT)
Dept: ADMINISTRATIVE | Facility: CLINIC | Age: 41
End: 2021-05-25

## 2021-05-28 ENCOUNTER — RECORDS - HEALTHEAST (OUTPATIENT)
Dept: ADMINISTRATIVE | Facility: CLINIC | Age: 41
End: 2021-05-28

## 2021-05-30 ENCOUNTER — RECORDS - HEALTHEAST (OUTPATIENT)
Dept: ADMINISTRATIVE | Facility: CLINIC | Age: 41
End: 2021-05-30

## 2021-05-31 ENCOUNTER — RECORDS - HEALTHEAST (OUTPATIENT)
Dept: ADMINISTRATIVE | Facility: CLINIC | Age: 41
End: 2021-05-31

## 2021-05-31 VITALS — WEIGHT: 231 LBS | HEIGHT: 67 IN | BODY MASS INDEX: 36.26 KG/M2

## 2021-06-01 ENCOUNTER — RECORDS - HEALTHEAST (OUTPATIENT)
Dept: ADMINISTRATIVE | Facility: CLINIC | Age: 41
End: 2021-06-01

## 2021-06-03 NOTE — PROGRESS NOTES
"CC: I was asked to see Shannon Quiroz by Dr Maldonado of [facility] secondary to menorrhagia.    HPI: The pt is a 39 y.o. MWF  who presents with worsening menorrhagia for the last 5 years or so, since the birth of her last child.  Her periods are heavy enough that she fills a menstrual cup every 1-2 hours.  They come about every 26 days and last 5-6 days.  She gets about 2 hours of light bleeding before \"the dam breaks,\" and the last day is manageable.  She is also using period underwear, but still leaking through.  She has cramps at the same time.  She gets up at least once at night to change tampons.  She is tired of leaking through her clothing and limiting her activity when her period comes.  She has used OCPs in the past with significant migraines.  The Nexplanon did the same.  She got worse cramps with the Mirena.  She had an ultrasound done 10/23/19 that showed a 2.8 cm endometrium with normal ovaries bilaterally.  She had an endometrial biopsy done 11/1/19 with a disordered proliferative endometrium.  Her  has had a vasectomy.    Past Medical History:   Diagnosis Date     Major depression in remission (H) 10/17/2016     Migraine Headache     when on OCPs     Nephrolithiasis     Created by Conversion        Past Surgical History:   Procedure Laterality Date     APPENDECTOMY  11/01/2012     CHOLECYSTECTOMY  2009     DILATION AND CURETTAGE OF UTERUS  2008    following sab     [facility] REMOVAL OF KIDNEY STONE      Description: Lithotomy;  Proc Date: 01/01/2008;       Patient's   Family History   Problem Relation Age of Onset     Breast cancer Mother 65     No Medical Problems Father      Breast cancer Maternal Grandmother      Cancer Paternal Grandmother         lung     Cancer Paternal Grandfather         brain       Patient   Social History     Socioeconomic History     Marital status:      Spouse name: None     Number of children: None     Years of education: None     Highest education level: " None   Occupational History     None   Social Needs     Financial resource strain: None     Food insecurity:     Worry: None     Inability: None     Transportation needs:     Medical: None     Non-medical: None   Tobacco Use     Smoking status: Never Smoker     Smokeless tobacco: Never Used   Substance and Sexual Activity     Alcohol use: No     Drug use: No     Sexual activity: Yes     Partners: Male     Birth control/protection: Surgical     Comment:  has vasectomy   Lifestyle     Physical activity:     Days per week: None     Minutes per session: None     Stress: None   Relationships     Social connections:     Talks on phone: None     Gets together: None     Attends Caodaism service: None     Active member of club or organization: None     Attends meetings of clubs or organizations: None     Relationship status: None     Intimate partner violence:     Fear of current or ex partner: None     Emotionally abused: None     Physically abused: None     Forced sexual activity: None   Other Topics Concern     None   Social History Narrative     None       Outpatient Medications Prior to Visit   Medication Sig Dispense Refill     SUMAtriptan (IMITREX) 100 MG tablet TAKE AT ONSET OF HEADACHE, MAY REPEAT ONCE IN 2 HOURS. MAX 200MG IN 24 HOURS 20 tablet 6     venlafaxine (EFFEXOR-XR) 150 MG 24 hr capsule TAKE 1 CAPSULE (150 MG TOTAL) BY MOUTH DAILY. 90 capsule 1     hydrOXYzine (ATARAX) 25 MG tablet Take 1-2 po q hs (Patient not taking: Reported on 11/19/2019      ) 60 tablet 12     hydrocortisone 2.5 % cream APPLY TOPICALLY 2 (TWO) TIMES A DAY. 28.35 g 0     oxyCODONE (ROXICODONE) 5 mg/5 mL solution Take 5 mL (5 mg total) by mouth every 4 (four) hours as needed for pain. 100 mL 0     No facility-administered medications prior to visit.        Patient has No Known Allergies.    ROS:  12 part ROS is negative aside from those symptoms in the HPI    PE:  /74 (Patient Site: Right Arm, Patient Position: Sitting,  Cuff Size: Adult Large)   Pulse 73   Wt (!) 257 lb (116.6 kg)   LMP 11/14/2019           Body mass index is 40.86 kg/m .    General: obese WF, NAD  Psych: normal mood  Neuro: CN I-XII grossly intact  MS: normal gait    Assessment: 39 y.o. MWF  with menorrhagia.    Plan: Natural history of menorrhagia and hormonal changes discussed with the patient.  We discussed that there are still options that she can try.  We discussed cyclic progestin starting with ovulation to balance her hormones better and hopefully reduce her periods.  We discussed Lysteda as an option to take with her periods.  We discussed that it helps about 70% of women.  We discussed endometrial ablation.  We discussed that ablation causes amenorrhea for about 45% of women and decreased periods for about 50% of women.  We discussed that after ablation, even if the bleeding is good, there can be significant dysmenorrhea because of the scarring.  At this time she would like to proceed with the ablation.  She would like to wait till 2020 as she has a large deductible.  She would like to do this in the OR.  She was scheduled for Messi 3 at [facility].  She was counseled on the need for someone to drive her home and stay with her after surgery, the need for a pre-op exam prior to the surgery, the need to have nothing to eat or drink after midnight on the day of surgery, and that the surgery center would call her a day or two before the procedure to go over details of the process and what time to arrive.  Prescription for the Lysteda was sent in for her to use with her Dec period.  She will let me know if she has further questions.    Questions were answered to the best of my ability.  Approximately 40 minutes were spent with the patient with the majority in counseling.

## 2021-06-03 NOTE — TELEPHONE ENCOUNTER
Name of caller: Patient  Phone number where you may be reached: [phone]  Reason for call: Pt has an ablation scheduled with Dr. Deleon on 1/3 at the [facility] surgery center but she would like to reschedule for the following week if possible. Please call pt to help reschedule.  Best time to call back: any  If we don't reach you, is it okay to leave a detailed message? Yes    Pt is aware that Dr. Deleon is not in clinic today and is fine waiting until tomorrow if necessary.

## 2021-06-03 NOTE — TELEPHONE ENCOUNTER
Shannon called per Dr Deleon.  Surgery was  [facility].  To 1-6-2020 at 7 am.  Message was left and instructed to call back if any questions.

## 2021-06-03 NOTE — TELEPHONE ENCOUNTER
Name of caller: Patient  Phone number where you may be reached: [phone]  Reason for call: pt needs to reschedule her 1/3 surgery with Dr Deleon. Does Dr Deleon have availability the week of Jan 6, 2020?  Best time to call back: today or tomorrow  If we don't reach you, is it okay to leave a detailed message? yes

## 2021-06-03 NOTE — PROGRESS NOTES
Called boris  And informed her that her susrgery h as been gene to 1-6-2020 at 7 am. Patient states she understands and has no questions.

## 2021-06-04 VITALS
HEART RATE: 73 BPM | WEIGHT: 257 LBS | SYSTOLIC BLOOD PRESSURE: 122 MMHG | BODY MASS INDEX: 40.86 KG/M2 | OXYGEN SATURATION: 97 % | DIASTOLIC BLOOD PRESSURE: 74 MMHG

## 2021-06-04 VITALS — HEIGHT: 67 IN | BODY MASS INDEX: 40.34 KG/M2 | WEIGHT: 257 LBS

## 2021-06-04 NOTE — ANESTHESIA POSTPROCEDURE EVALUATION
Patient: Shannon Quiroz  HYSTEROSCOPY, WITH ENDOMETRIAL RADIOFREQUENCY ABLATION - NOVASURE  Anesthesia type: MAC    Patient location: Phase II Recovery  Last vitals:   Vitals Value Taken Time   /63 1/6/2020  8:30 AM   Temp 36.2  C (97.1  F) 1/6/2020  7:40 AM   Pulse 70 1/6/2020  8:34 AM   Resp 16 1/6/2020  8:30 AM   SpO2 96 % 1/6/2020  8:34 AM   Vitals shown include unvalidated device data.  Post vital signs: stable  Level of consciousness: awake and responds to simple questions  Post-anesthesia pain: pain controlled  Post-anesthesia nausea and vomiting: no  Pulmonary: unassisted, return to baseline  Cardiovascular: stable and blood pressure at baseline  Hydration: adequate  Anesthetic events: no    QCDR Measures:  ASA# 11 - Elisha-op Cardiac Arrest: ASA11B - Patient did NOT experience unanticipated cardiac arrest  ASA# 12 - Elisha-op Mortality Rate: ASA12B - Patient did NOT die  ASA# 13 - PACU Re-Intubation Rate: NA - No ETT / LMA used for case  ASA# 10 - Composite Anes Safety: ASA10A - No serious adverse event    Additional Notes:

## 2021-06-04 NOTE — ANESTHESIA CARE TRANSFER NOTE
Last vitals:   Vitals:    01/06/20 0740   BP: 122/79   Pulse: 83   Resp: 16   Temp: 36.2  C (97.1  F)   SpO2: 93%     Patient's level of consciousness is drowsy  Spontaneous respirations: yes  Maintains airway independently: yes  Dentition unchanged: yes  Oropharynx: oropharynx clear of all foreign objects    QCDR Measures:  ASA# 20 - Surgical Safety Checklist: WHO surgical safety checklist completed prior to induction    PQRS# 430 - Adult PONV Prevention: 4558F - Pt received => 2 anti-emetic agents (different classes) preop & intraop  ASA# 8 - Peds PONV Prevention: NA - Not pediatric patient, not GA or 2 or more risk factors NOT present  PQRS# 424 - Elisha-op Temp Management: 4559F - At least one body temp DOCUMENTED => 35.5C or 95.9F within required timeframe  PQRS# 426 - PACU Transfer Protocol: - Transfer of care checklist used  ASA# 14 - Acute Post-op Pain: ASA14B - Patient did NOT experience pain >= 7 out of 10

## 2021-06-04 NOTE — ANESTHESIA PREPROCEDURE EVALUATION
Anesthesia Evaluation      Patient summary reviewed   No history of anesthetic complications     Airway   Mallampati: II  Neck ROM: full   Pulmonary - normal exam   (+) sleep apnea on no CPAP, severe,                          Cardiovascular - normal exam  Exercise tolerance: > or = 4 METS   Neuro/Psych    (+) depression,     Comments: Migraines      Endo/Other    (+) obesity (bmi 40),      GI/Hepatic/Renal    (+)   chronic renal disease (nephrolithiasis),           Dental - normal exam   (+) caps                       Anesthesia Plan  Planned anesthetic: MAC  Versed/fentanyl/propofol  Ketamine PRN  Decadron/zofran  toradol if OK with surgeon      ASA 3   Induction: intravenous   Anesthetic plan and risks discussed with: patient and spouse  Anesthesia plan special considerations: antiemetics,   Post-op plan: routine recovery      UPT pending    Recent Labs   Lab Test 10/04/19  1101 08/30/18  1117   HGB 14.2 11.1*    218

## 2021-06-14 NOTE — PROGRESS NOTES
Assessment/ Plan     1. Routine general medical examination at a health care facility  As far as healthcare maintenance, she will be due for a Pap in a couple of months, so we will update today.  We will do fasting labs.  She states she is having some difficulty losing weight so we will check a TSH.  However she has been losing 2-3 pounds per week which I told her was fantastic.  Her abdominal symptoms appears to be diastases rectae and not hernia.  She will let me know if it is worsening or getting larger.  - Comprehensive Metabolic Panel  - Gynecologic Cytology (PAP Smear)  - HM2(CBC w/o Differential)  - Lipid Cascade  - Thyroid Stimulating Hormone (TSH)    2. Migraine  Refill sent for Imitrex.    3. Major depression in remission  Patient continues on the venlafaxine and its working well.  PHQ9 equals 7.    Subjective:     Shannon Quiroz is a 37 y.o. female who presents for an annual exam.  She has several concerns today.  She feels like it taking her long time to lose weight.  She started doing [employer] and joined a gym.  She is lost about 10 pounds.  When I asked her how much weight she is losing per week, she is averaging 2-3 pounds.  I reassured her that this is actually quite a good amount of weight to lose per week.  Average would be a half a pound to a pound.  She has also noticed a cone-shaped lump on her abdomen.  She only notices it when she tries to do a sit up or she is straining.  It is nontender.  She has had multiple abdominal surgeries including cholecystectomy.  It is not anywhere near any of her incisions.  She has had 3 pregnancies, all fairly big babies.  She continues to have migraines that respond well to the Imitrex.  She needs refills today.  These tend to be menstrual headaches.  She has a history of depression which is under very good control with the venlafaxine.  She would like to stay on it for now.    Healthy Habits:   Regular Exercise: Yes  Sunscreen Use: Yes  Healthy Diet:  Yes  Dental Visits Regularly: Yes  Seat Belt: Yes  Sexually active: Yes  Self Breast Exam Monthly:No  Colonoscopy: No  Lipid Profile: No  Glucose Screen: No  Prevention of Osteoporosis: No  Last Dexa: No        Immunization History   Administered Date(s) Administered     DTaP, historic 1979, 1979, 1979, 1981, 1984     Influenza, inj, historic,unspecified 2010, 10/20/2011, 10/22/2014     Influenza, nasal, historic 10/13/2009     MMR 1980, 1991     Td,adult,historic,unspecified 1994     Tdap 2015     Immunization status: up to date and documented.     Gynecologic History  Patient's last menstrual period was 10/21/2017.  Contraception: vasectomy  Last Pap: . Results were: normal        OB History    Para Term  AB Living   4 3 3 0 1 3   SAB TAB Ectopic Multiple Live Births   1 0 0 0 4      # Outcome Date GA Lbr Reid/2nd Weight Sex Delivery Anes PTL Lv   4 Term 14 39w0d 04:03 / 00:03 8 lb 6 oz (3.799 kg) F Vag-Spont EPI N DEVORAH   3 Term     F Vag-Spont EPI N DEVORAH   2 Term     F Vag-Spont EPI N DEVORAH   1 SAB 2008        DEC          Current Outpatient Prescriptions   Medication Sig Dispense Refill     SUMAtriptan (IMITREX) 100 MG tablet TAKE AT ONSET OF HEADACHE, MAY REPEAT ONCE IN 2 HOURS. MAX 200MG IN 24 HOURS 20 tablet 6     venlafaxine (EFFEXOR-XR) 150 MG 24 hr capsule TAKE 1 CAPSULE (150 MG TOTAL) BY MOUTH DAILY. 90 capsule 1     hydrocortisone 2.5 % cream Apply topically 2 (two) times a day. 30 g 6     hydrOXYzine (ATARAX) 25 MG tablet Take 1-2 po q hs 60 tablet 12     No current facility-administered medications for this visit.      Past Medical History:   Diagnosis Date     Major depression in remission 10/17/2016     Migraine Headache     Created by Conversion  Replacement Utility updated for latest IMO load     Nephrolithiasis     Created by Conversion      Past Surgical History:   Procedure Laterality Date     APPENDECTOMY    "    CHOLECYSTECTOMY       DILATION AND CURETTAGE OF UTERUS  2008    following sab     VA APPENDECTOMY      Description: Appendectomy;  Proc Date: 11/01/2012;     VA REMOVAL GALLBLADDER      Description: Cholecystectomy;  Proc Date: 01/01/2009;     VA REMOVAL OF KIDNEY STONE      Description: Lithotomy;  Proc Date: 01/01/2008;     Review of patient's allergies indicates no known allergies.  Family History   Problem Relation Age of Onset     No Medical Problems Mother      No Medical Problems Father      Breast cancer Maternal Grandmother      Cancer Paternal Grandmother      lung     Cancer Paternal Grandfather      brain     Social History     Social History     Marital status:      Spouse name: N/A     Number of children: N/A     Years of education: N/A     Occupational History     Not on file.     Social History Main Topics     Smoking status: Never Smoker     Smokeless tobacco: Not on file     Alcohol use No     Drug use: No     Sexual activity: Yes     Partners: Male     Other Topics Concern     Not on file     Social History Narrative       Review of Systems  General:  Denies problems  Eyes:  Denies problems  Ears/Nose/Throat:  Denies problems  Cardiovascular:  Denies problems  Respiratory:  Denies problems  Gastrointestinal:  Denies problems  Genitourinary:  Denies problems  Musculoskeletal:  Denies problems  Skin:  Denies problems  Neurologic:  Denies problems  Psychiatric:  Denies problems  Endocrine:  Denies problems  Heme/Lymphatic:  Denies problems  Allergic/Immunologic:  Denies problems    Objective:      Vitals:    11/09/17 1112   BP: 126/80   Pulse: 80   Resp: 18   Temp: 97.8  F (36.6  C)   TempSrc: Oral   Weight: (!) 231 lb (104.8 kg)   Height: 5' 6.5\" (1.689 m)       Physical Exam:  General Appearance: Alert, cooperative, no distress, appears stated age  Head: Normocephalic, without obvious abnormality, atraumatic  Eyes: PERRL, conjunctiva/corneas clear, EOM's intact  Ears: Normal TM's and " external ear canals, both ears  Nose: Nares normal, septum midline,mucosa normal, no drainage  Throat: Lips, mucosa, and tongue normal; teeth and gums normal  Neck: Supple, symmetrical, trachea midline, no adenopathy; thyroid: not enlarged, symmetric, no tenderness/mass/nodules; no carotid bruit  Back: Symmetric, no curvature, ROM normal, no CVA tenderness  Lungs: Clear to auscultation bilaterally, respirations unlabored  Breasts: No breast masses, tenderness, asymmetry, or nipple discharge  Heart: Regular rate and rhythm, S1 and S2 normal, no murmur, rub, or gallop, Abdomen: Soft, non-tender, bowel sounds active all four quadrants,  no masses, no organomegaly  Pelvic: Normally developed genitalia with no external lesions or eruptions. Vagina and cervix show no lesions, inflammation, discharge or tenderness. Uterus normal to palpation.  No adnexal mass or tenderness.  Left groin has a scarred down sebaceous cyst.  Reassurance given to patient.  Extremities: Extremities normal, atraumatic, no cyanosis or edema  Skin: Skin color, texture, turgor normal, no rashes or lesions  Lymph nodes: Cervical, supraclavicular, and axillary nodes normal  Neurologic: Normal        The following high BMI interventions were performed this visit: encouragement to exercise and dietary needs education    Results for orders placed or performed in visit on 11/09/17   2(CBC w/o Differential)   Result Value Ref Range    WBC 4.3 4.0 - 11.0 thou/uL    RBC 4.80 3.80 - 5.40 mill/uL    Hemoglobin 11.2 (L) 12.0 - 16.0 g/dL    Hematocrit 35.2 35.0 - 47.0 %    MCV 73 (L) 80 - 100 fL    MCH 23.4 (L) 27.0 - 34.0 pg    MCHC 31.9 (L) 32.0 - 36.0 g/dL    RDW 14.1 11.0 - 14.5 %    Platelets 242 140 - 440 thou/uL    MPV 7.3 7.0 - 10.0 fL       Rere Handy MD    This note has been dictated using voice recognition software. Any grammatical or context distortions are unintentional and inherent to the software

## 2021-07-01 ENCOUNTER — HOSPITAL ENCOUNTER (OUTPATIENT)
Dept: MAMMOGRAPHY | Facility: CLINIC | Age: 41
Discharge: HOME | End: 2021-07-01
Payer: COMMERCIAL

## 2021-07-01 ENCOUNTER — TRANSFERRED RECORDS (OUTPATIENT)
Dept: HEALTH INFORMATION MANAGEMENT | Facility: CLINIC | Age: 41
End: 2021-07-01

## 2021-07-01 DIAGNOSIS — Z12.31 VISIT FOR SCREENING MAMMOGRAM: ICD-10-CM

## 2021-09-26 ENCOUNTER — HEALTH MAINTENANCE LETTER (OUTPATIENT)
Age: 41
End: 2021-09-26

## 2021-10-19 PROBLEM — F32.9 MAJOR DEPRESSION: Status: ACTIVE | Noted: 2018-06-20

## 2021-11-16 DIAGNOSIS — F33.0 MAJOR DEPRESSIVE DISORDER, RECURRENT EPISODE, MILD (H): ICD-10-CM

## 2021-11-16 NOTE — TELEPHONE ENCOUNTER
Routing refill request to provider for review/approval because:  Patient needs to be seen because:  6 mo check, needs Cr.  Needs update phq9, sent questionnaire via my chart.  Radha De Los Santos RN

## 2021-11-17 RX ORDER — VENLAFAXINE HYDROCHLORIDE 37.5 MG/1
CAPSULE, EXTENDED RELEASE ORAL
Qty: 90 CAPSULE | Refills: 1 | Status: SHIPPED | OUTPATIENT
Start: 2021-11-17 | End: 2022-04-29

## 2021-12-23 ENCOUNTER — VIRTUAL VISIT (OUTPATIENT)
Dept: FAMILY MEDICINE | Facility: CLINIC | Age: 41
End: 2021-12-23
Payer: COMMERCIAL

## 2021-12-23 DIAGNOSIS — J20.9 ACUTE BRONCHITIS WITH SYMPTOMS > 10 DAYS: Primary | ICD-10-CM

## 2021-12-23 PROCEDURE — 99213 OFFICE O/P EST LOW 20 MIN: CPT | Mod: 95 | Performed by: NURSE PRACTITIONER

## 2021-12-23 RX ORDER — CODEINE PHOSPHATE AND GUAIFENESIN 10; 100 MG/5ML; MG/5ML
1-2 SOLUTION ORAL EVERY 4 HOURS PRN
Qty: 180 ML | Refills: 0 | Status: SHIPPED | OUTPATIENT
Start: 2021-12-23 | End: 2022-05-03

## 2021-12-23 RX ORDER — DOXYCYCLINE HYCLATE 100 MG
100 TABLET ORAL 2 TIMES DAILY
Qty: 14 TABLET | Refills: 0 | Status: SHIPPED | OUTPATIENT
Start: 2021-12-23 | End: 2021-12-30

## 2021-12-23 NOTE — PROGRESS NOTES
"Melly is a 41 year old who is being evaluated via a billable telephone visit.      What phone number would you like to be contacted at? [phone]  How would you like to obtain your AVS? MyChart    Assessment & Plan     Acute bronchitis with symptoms > 10 days  Start doxycycline. If not improving, may need to  Be seen in office for exam on Monday.  - doxycycline hyclate (VIBRA-TABS) 100 MG tablet; Take 1 tablet (100 mg) by mouth 2 times daily for 7 days  - guaiFENesin-codeine (ROBITUSSIN AC) 100-10 MG/5ML solution; Take 5-10 mLs by mouth every 4 hours as needed for cough    BMI:   Estimated body mass index is 40.69 kg/m  as calculated from the following:    Height as of 4/27/21: 1.67 m (5' 5.75\").    Weight as of 4/27/21: 113.5 kg (250 lb 3.2 oz).       Patient Instructions   Start doxycycline twice daily for a week  Can use cough medicine at night as needed  If not improving by Monday, please let me know.      Return in about 1 week (around 12/30/2021) for worsening or continued symptoms.    KYLE Patel CNP  [facility]    Subjective   Melly is a 41 year old who presents for the following health issues     HPI     Concern for COVID-19  About how many days ago did these symptoms start? Around Nov 28th, tested postive around Nov 30th. Got better and now cough is getting worse the past 10 days   Is this your first visit for this illness? No   How would you describe your symptoms since your last visit? My symptoms have worsened  In the 14 days before your symptoms started, have you had close contact with someone with COVID-19 (Coronavirus)? I do not know.  Do you have a fever or chills? Yes, I felt feverish or had chills, last temp was 101 a couple days ago, cheeks feel warm and hot    Are you having new or worsening difficulty breathing? Yes   Please describe what kind of difficulty you are having breathing:No dyspnea, or dyspnea at patients normal baseline  Do you have new or " worsening cough? Yes, I am coughing up mucus. clear   Have you had any new or unexplained body aches? YES  Just from coughing and upper back   Have you experienced any of the following NEW symptoms?    Headache: No    Sore throat: No    Loss of taste or smell: No, came back     Chest pain: Yes, chest tightness, achey from coughing, wheezing     Diarrhea: No    Rash: No  What treatments have you tried? No   Who do you live with?  and 3 kids   Are you, or a household member, a healthcare worker or a ? No  Do you live in a nursing home, group home, or shelter? No  Do you have a way to get food/medications if quarantined? Yes, I have a friend or family member who can help me.      Above HPI reviewed. Additionally, no chest pain, shortness of breath, dyspnea on exertion. Symptoms were fully improved, worsened again about a week ago.              Review of Systems   Constitutional, HEENT, cardiovascular, pulmonary, gi and gu systems are negative, except as otherwise noted.      Objective           Vitals:  No vitals were obtained today due to virtual visit.    Physical Exam   healthy, alert and no distress  PSYCH: Alert and oriented times 3; coherent speech, normal   rate and volume, able to articulate logical thoughts, able   to abstract reason, no tangential thoughts, no hallucinations   or delusions  Her affect is normal  RESP: No cough, no audible wheezing, able to talk in full sentences  Remainder of exam unable to be completed due to telephone visits                Phone call duration: 5 minutes

## 2021-12-23 NOTE — PROGRESS NOTES
Melly is a 41 year old who is being evaluated via a billable video visit.      How would you like to obtain your AVS? MyChart  If the video visit is dropped, the invitation should be resent by: Text to cell phone: [phone]  Will anyone else be joining your video visit? No  {If patient encounters technical issues they should call [phone] :363083}    Video Start Time: {video visit start/end time for provider to select:605213}    {PROVIDER CHARTING PREFERENCE:074732}    Subjective   Melly is a 41 year old who presents for the following health issues {ACCOMPANIED BY STATEMENT (Optional):974486}    HPI       Concern for COVID-19  About how many days ago did these symptoms start? Around Nov 28th, tested postive around Nov 30th. Got better and now cough is getting worse the past 10 days   Is this your first visit for this illness? No   How would you describe your symptoms since your last visit? My symptoms have worsened  In the 14 days before your symptoms started, have you had close contact with someone with COVID-19 (Coronavirus)? I do not know.  Do you have a fever or chills? Yes, I felt feverish or had chills, last temp was 101 a couple days ago, cheeks feel warm and hot    Are you having new or worsening difficulty breathing? Yes   Please describe what kind of difficulty you are having breathing:No dyspnea, or dyspnea at patients normal baseline  Do you have new or worsening cough? Yes, I am coughing up mucus. clear   Have you had any new or unexplained body aches? YES  Just from coughing and upper back   Have you experienced any of the following NEW symptoms?    Headache: No    Sore throat: No    Loss of taste or smell: No, came back     Chest pain: Yes, chest tightness, achey from coughing, wheezing     Diarrhea: No    Rash: No  What treatments have you tried? No   Who do you live with?  and 3 kids   Are you, or a household member, a healthcare worker or a ? No  Do you live in a  "nursing home, group home, or shelter? No  Do you have a way to get food/medications if quarantined? Yes, I have a friend or family member who can help me.    {Provider  Link to COVID SmartSet :746912}      {additonal problems for provider to add (Optional):460050}    Review of Systems   {ROS COMP (Optional):483536}      Objective           Vitals:  No vitals were obtained today due to virtual visit.    Physical Exam   {video visit exam brief selected:961309::\"GENERAL: Healthy, alert and no distress\",\"EYES: Eyes grossly normal to inspection.  No discharge or erythema, or obvious scleral/conjunctival abnormalities.\",\"RESP: No audible wheeze, cough, or visible cyanosis.  No visible retractions or increased work of breathing.  \",\"SKIN: Visible skin clear. No significant rash, abnormal pigmentation or lesions.\",\"NEURO: Cranial nerves grossly intact.  Mentation and speech appropriate for age.\",\"PSYCH: Mentation appears normal, affect normal/bright, judgement and insight intact, normal speech and appearance well-groomed.\"}    {Diagnostic Test Results (Optional):003407}    {AMBULATORY ATTESTATION (Optional):328614}        Video-Visit Details    Type of service:  Video Visit    Video End Time:{video visit start/end time for provider to select:438073}    Originating Location (pt. Location): {video visit patient location:494392::\"Home\"}    Distant Location (provider location):  [facility]     Platform used for Video Visit: {Virtual Visit Platforms:274594::\"Savorfull\"}  "

## 2021-12-23 NOTE — PATIENT INSTRUCTIONS
Start doxycycline twice daily for a week  Can use cough medicine at night as needed  If not improving by Monday, please let me know.

## 2022-01-28 DIAGNOSIS — G43.829 MENSTRUAL MIGRAINE WITHOUT STATUS MIGRAINOSUS, NOT INTRACTABLE: ICD-10-CM

## 2022-01-28 RX ORDER — SUMATRIPTAN 100 MG/1
TABLET, FILM COATED ORAL
Qty: 16 TABLET | Refills: 0 | Status: SHIPPED | OUTPATIENT
Start: 2022-01-28 | End: 2022-02-17

## 2022-01-28 NOTE — TELEPHONE ENCOUNTER
Prescription approved per [facility] Refill Protocol.  Radha De Los Santos RN

## 2022-04-28 DIAGNOSIS — F33.0 MAJOR DEPRESSIVE DISORDER, RECURRENT EPISODE, MILD (H): ICD-10-CM

## 2022-04-28 NOTE — TELEPHONE ENCOUNTER
Routing refill request to provider for review/approval because:  Patient has appointment 5/3/22, needs update phq9,labs not current.  Radha De Los Santos RN

## 2022-04-29 RX ORDER — VENLAFAXINE HYDROCHLORIDE 37.5 MG/1
CAPSULE, EXTENDED RELEASE ORAL
Qty: 90 CAPSULE | Refills: 1 | Status: SHIPPED | OUTPATIENT
Start: 2022-04-29 | End: 2022-10-06

## 2022-05-03 ENCOUNTER — PATIENT OUTREACH (OUTPATIENT)
Dept: ONCOLOGY | Facility: CLINIC | Age: 42
End: 2022-05-03

## 2022-05-03 ENCOUNTER — OFFICE VISIT (OUTPATIENT)
Dept: FAMILY MEDICINE | Facility: CLINIC | Age: 42
End: 2022-05-03
Payer: COMMERCIAL

## 2022-05-03 VITALS
TEMPERATURE: 98.1 F | OXYGEN SATURATION: 97 % | BODY MASS INDEX: 41.17 KG/M2 | HEIGHT: 66 IN | WEIGHT: 256.2 LBS | HEART RATE: 84 BPM | DIASTOLIC BLOOD PRESSURE: 82 MMHG | SYSTOLIC BLOOD PRESSURE: 124 MMHG

## 2022-05-03 DIAGNOSIS — G89.29 CHRONIC MIDLINE LOW BACK PAIN WITHOUT SCIATICA: ICD-10-CM

## 2022-05-03 DIAGNOSIS — M54.50 CHRONIC MIDLINE LOW BACK PAIN WITHOUT SCIATICA: ICD-10-CM

## 2022-05-03 DIAGNOSIS — L85.8 KERATOSIS PILARIS: ICD-10-CM

## 2022-05-03 DIAGNOSIS — Z13.6 CARDIOVASCULAR SCREENING; LDL GOAL LESS THAN 160: ICD-10-CM

## 2022-05-03 DIAGNOSIS — Z12.31 ENCOUNTER FOR SCREENING MAMMOGRAM FOR BREAST CANCER: ICD-10-CM

## 2022-05-03 DIAGNOSIS — G43.829 MENSTRUAL MIGRAINE WITHOUT STATUS MIGRAINOSUS, NOT INTRACTABLE: ICD-10-CM

## 2022-05-03 DIAGNOSIS — F33.0 MAJOR DEPRESSIVE DISORDER, RECURRENT EPISODE, MILD (H): ICD-10-CM

## 2022-05-03 DIAGNOSIS — Z00.00 ROUTINE GENERAL MEDICAL EXAMINATION AT A HEALTH CARE FACILITY: Primary | ICD-10-CM

## 2022-05-03 DIAGNOSIS — R79.0 LOW FERRITIN: ICD-10-CM

## 2022-05-03 DIAGNOSIS — Z80.3 FAMILY HISTORY OF MALIGNANT NEOPLASM OF BREAST: ICD-10-CM

## 2022-05-03 DIAGNOSIS — E66.01 MORBID OBESITY (H): ICD-10-CM

## 2022-05-03 PROCEDURE — 99396 PREV VISIT EST AGE 40-64: CPT | Performed by: FAMILY MEDICINE

## 2022-05-03 PROCEDURE — 99214 OFFICE O/P EST MOD 30 MIN: CPT | Mod: 25 | Performed by: FAMILY MEDICINE

## 2022-05-03 RX ORDER — SUMATRIPTAN 100 MG/1
TABLET, FILM COATED ORAL
Qty: 16 TABLET | Refills: 5 | Status: SHIPPED | OUTPATIENT
Start: 2022-05-03 | End: 2023-05-25

## 2022-05-03 RX ORDER — AMMONIUM LACTATE 12 G/100G
LOTION TOPICAL 2 TIMES DAILY
Qty: 225 G | Refills: 3 | Status: SHIPPED | OUTPATIENT
Start: 2022-05-03 | End: 2024-02-01

## 2022-05-03 RX ORDER — VITAMIN B COMPLEX
1 TABLET ORAL DAILY
COMMUNITY
End: 2023-06-06

## 2022-05-03 ASSESSMENT — ENCOUNTER SYMPTOMS
PALPITATIONS: 1
NERVOUS/ANXIOUS: 1
COUGH: 1

## 2022-05-03 NOTE — PROGRESS NOTES
"   SUBJECTIVE:   CC: Shannon Quiroz is an 41 year old woman who presents for preventive health visit.       Patient has been advised of split billing requirements and indicates understanding: Yes     Healthy Habits:     Getting at least 3 servings of Calcium per day:  Yes    Bi-annual eye exam:  Yes    Dental care twice a year:  Yes    Sleep apnea or symptoms of sleep apnea:  Sleep apnea    Diet:  Regular (no restrictions)    Frequency of exercise:  1 day/week    Duration of exercise:  Greater than 60 minutes    Taking medications regularly:  Yes    PHQ-2 Total Score: 0    Additional concerns today:  Yes     Concerns:  * low back pain     In 2014 while pregnant ended up with a ruptured disc in the low back.  Had a steroid injection at that time.  Has been an intermittent issue since then.  Can go a year or gale with no symptoms and then something can trigger it and will have pain.  Generally visits with a chiropractor for 2-3 visits and then is fine.  Has been like this for 7 years with no concerns.    Had covid end of Nov and early Dec.  Ended up with back pain from her coughing.  Has been seeing the chiropractor who \"has done all he can do\".    Now has pain in the low back.  Present when she wakes in the morning \"can't bend over\".  Has trouble getting dressed in the morning due to pain.   Improves with activity.  Has complete relief when she lays down but then worse when she tries to get back up.  Has no pain when sitting upright.  Feels like she has to lean back to get relief.  Radiates to both hips but not down the leg.  When \"really bad\" can radiate towards the groin.  No numbness/tingling/weakness that is new.  Some sensory loss related to previous injury in 2014.      Has never done PT.  Had MRI in 2014, that was her last imaging.      * palpitations    Notices when she is laying in bed at night.  Will feel like a \"weird hiccup\" that is going on in her throat.  Lasts 2 seconds and then resolves.  Does not " "feel anything in her chest area.  Has been going on for about a year.  Might feel this once or twice a week.  No other symptoms noted associated with this.    Has been very stressed at work..  Has had some chest tightness in the L chest.  Felt \"sore\" in the upper chest, more like a pulled muscle.  Noticed it 2 weeks ago.  Lasted for a couple of days continuously.  Was doing a lot of stretching as well for her back at that time.  Wondered if she might have pulled a muscle causing this sensatio. no SOB, nausea, radiation of pain.  Did not seem to change with activity or rest.       * discuss possible stomach surgery   Feels like when she loses weight she losses everywhere but her stomach.  Very frustrated by her larger stomach that she feels pull son her back and causes increased back pain.  Feels she does a good job with exercise when her back is not bothering.  Feels this is a big  of weight loss for her.    Would really like someone to just remove the tissue mass from her abdomen.      *  Bumpy legs.  Skin is bumpy with red spots.  Has been present for a long time, years.  Just does not like the way her legs look in shorts.    Today's PHQ-2 Score:   PHQ-2 ( 1999 Pfizer) 5/3/2022   Q1: Little interest or pleasure in doing things 0   Q2: Feeling down, depressed or hopeless 0   PHQ-2 Score 0   PHQ-2 Total Score (12-17 Years)- Positive if 3 or more points; Administer PHQ-A if positive -   Q1: Little interest or pleasure in doing things Not at all   Q2: Feeling down, depressed or hopeless Not at all   PHQ-2 Score 0       Abuse: Current or Past (Physical, Sexual or Emotional) - No  Do you feel safe in your environment? Yes    Have you ever done Advance Care Planning? (For example, a Health Directive, POLST, or a discussion with a medical provider or your loved ones about your wishes):    Social History     Tobacco Use     Smoking status: Never Smoker     Smokeless tobacco: Never Used   Substance Use Topics     " Alcohol use: No         Alcohol Use 5/3/2022   Prescreen: >3 drinks/day or >7 drinks/week? Not Applicable       Reviewed orders with patient.  Reviewed health maintenance and updated orders accordingly - Yes      Breast Cancer Screening:    FHS-7:   Breast CA Risk Assessment (FHS-7) 5/3/2022   Did any of your first-degree relatives have breast or ovarian cancer? Yes   Did any of your relatives have bilateral breast cancer? No   Did any man in your family have breast cancer? No   Did any woman in your family have breast and ovarian cancer? Yes   Did any woman in your family have breast cancer before age 50 y? No   Do you have 2 or more relatives with breast and/or ovarian cancer? Yes   Do you have 2 or more relatives with breast and/or bowel cancer? Yes       Mammogram Screening - Offered annual screening and updated Health Maintenance for mutual plan based on risk factor consideration    Pertinent mammograms are reviewed under the imaging tab.    History of abnormal Pap smear: YES - updated in Problem List and Health Maintenance accordingly  PAP / HPV Latest Ref Rng & Units 10/4/2019 11/9/2017 8/6/2009   PAP - - Negative for squamous intraepithelial lesion or malignancy  Electronically signed by Janie Mccord CT (ASCP) on 11/15/2017 at  2:34 PM   -   PAP (Historical) - NIL - NIL   HPV16 NEG:Negative Negative - -   HPV18 NEG:Negative Negative - -   HRHPV NEG:Negative Negative - -     Reviewed and updated as needed this visit by clinical staff   Tobacco  Allergies  Meds   Med Hx  Surg Hx  Fam Hx  Soc Hx          Reviewed and updated as needed this visit by Provider   Tobacco  Allergies  Meds   Med Hx  Surg Hx  Fam Hx  Soc Hx         Past Medical History:   Diagnosis Date     Calculus of kidney     Created by Conversion      Calculus of kidney     Created by Conversion      Major depression in remission (H) 10/17/2016     Major depression in remission (H) 10/17/2016     Migraine     when on OCPs      "Migraine     when on OCPs     Nephrolithiasis      Sleep apnea     No longer uses a CPAP,     Sleep apnea     No longer uses a CPAP,      Past Surgical History:   Procedure Laterality Date     APPENDECTOMY  11/01/2012     CHOLECYSTECTOMY  2009     CHOLECYSTECTOMY, LAPOROSCOPIC  6-1-09     DILATION AND CURETTAGE  2008    following sab     HYSTEROSCOPY, ABLATE ENDOMETRIUM HYDROTHERMAL, COMBINED N/A 1/6/2020    Procedure: HYSTEROSCOPY, WITH ENDOMETRIAL RADIOFREQUENCY ABLATION - NOVASURE;  Surgeon: Georgia Deleon MD;  Location: [facility];  Service: Gynecology     IR LUMBAR EPIDURAL STEROID INJECTION  11/13/2014     SURGICAL HISTORY OF -   1/7/09    vaginal birth     SURGICAL HISTORY OF -   3/1/08    Kidney stone removed     ZZC REMOVAL OF KIDNEY STONE      Description: Lithotomy;  Proc Date: 01/01/2008;       Review of Systems   Respiratory: Positive for cough.    Cardiovascular: Positive for palpitations.   Psychiatric/Behavioral: The patient is nervous/anxious.      HA had been stable, 4-5 per month.  imitrex continues to work well.  Get scanty menstrual flow and menstrual crmaps.         OBJECTIVE:   /82   Pulse 84   Temp 98.1  F (36.7  C) (Tympanic)   Ht 1.679 m (5' 6.1\")   Wt 116.2 kg (256 lb 3.2 oz)   LMP 04/28/2022   SpO2 97%   Breastfeeding No   BMI 41.23 kg/m    Physical Exam  GENERAL: healthy, alert and no distress  EYES: Eyes grossly normal to inspection, PERRL and conjunctivae and sclerae normal  NECK: no adenopathy, no asymmetry, masses, or scars and thyroid normal to palpation  RESP: lungs clear to auscultation - no rales, rhonchi or wheezes  CV: regular rate and rhythm, normal S1 S2, no S3 or S4, no murmur, click or rub, no peripheral edema and peripheral pulses strong  ABDOMEN: soft, nontender, no hepatosplenomegaly, no masses and bowel sounds normal  MS: no gross musculoskeletal defects noted, no edema  NEURO: Normal strength and tone, mentation intact and speech normal  PSYCH: " "mentation appears normal, affect normal/bright    Diagnostic Test Results:  Labs reviewed in Epic    ASSESSMENT/PLAN:       ICD-10-CM    1. Routine general medical examination at a health care facility  Z00.00    2. Chronic midline low back pain without sciatica  M54.50 MR Lumbar Spine w/o Contrast    G89.29    3. Keratosis pilaris  L85.8 ammonium lactate (LAC-HYDRIN) 12 % external lotion   4. Menstrual migraine without status migrainosus, not intractable  G43.829 SUMAtriptan (IMITREX) 100 MG tablet   5. Major depressive disorder, recurrent episode, mild (H)  F33.0    6. Morbid obesity (H)  E66.01 Comprehensive metabolic panel (BMP + Alb, Alk Phos, ALT, AST, Total. Bili, TP)     TSH with free T4 reflex     Hemoglobin A1c   7. Family history of malignant neoplasm of breast  Z80.3 Cancer Risk Mgmt/Cancer Genetic Counseling Referral   8. Encounter for screening mammogram for breast cancer  Z12.31 *MA Screening Digital Bilateral   9. CARDIOVASCULAR SCREENING; LDL GOAL LESS THAN 160  Z13.6 Lipid panel reflex to direct LDL Fasting   10. Low ferritin  R79.0 Ferritin     Low back pain:  Long standing and worsening.  Previous disc.  Plan to update imaging and proceed with plan based on results.    Keratosis pilaris:  Cosmetic, reviewed lachydrin use    Migraine:  Stable, meds refills.    Morbid obesity:  Reviewed diet/exercise.  Discussed that no surgery would be recommended to directly remove tissue from her abdomen but rather weight loss would be recommended.  Could reviewed medication vs surgical management.  Pt asked to schedule for additional eval.    Patient has been advised of split billing requirements and indicates understanding: Yes    COUNSELING:  Reviewed preventive health counseling, as reflected in patient instructions    Estimated body mass index is 41.23 kg/m  as calculated from the following:    Height as of this encounter: 1.679 m (5' 6.1\").    Weight as of this encounter: 116.2 kg (256 lb 3.2 " oz).    Weight management plan: Discussed healthy diet and exercise guidelines asked pt to schedule additional visit to review medical weight management    She reports that she has never smoked. She has never used smokeless tobacco.      Counseling Resources:  ATP IV Guidelines  Pooled Cohorts Equation Calculator  Breast Cancer Risk Calculator  BRCA-Related Cancer Risk Assessment: FHS-7 Tool  FRAX Risk Assessment  ICSI Preventive Guidelines  Dietary Guidelines for Americans, 2010  USDA's MyPlate  ASA Prophylaxis  Lung CA Screening    Jennifer Maldonado DO  [facility]

## 2022-05-03 NOTE — NURSING NOTE
"Initial /82   Pulse 84   Temp 98.1  F (36.7  C) (Tympanic)   Ht 1.679 m (5' 6.1\")   Wt 116.2 kg (256 lb 3.2 oz)   LMP 04/28/2022   SpO2 97%   Breastfeeding No   BMI 41.23 kg/m   Estimated body mass index is 41.23 kg/m  as calculated from the following:    Height as of this encounter: 1.679 m (5' 6.1\").    Weight as of this encounter: 116.2 kg (256 lb 3.2 oz). .      "

## 2022-05-03 NOTE — PATIENT INSTRUCTIONS
Please call [phone] to schedule your back MRI.  We'll see what that looks like and then determine best next steps.    Please return fasting for blood work when you are able.  You will need to be fasting for 12 hours (nothing but water) prior to your blood work.    You should get a call to visit with the genetic counselor regarding your family history of breast cancer    Please schedule a visit to see me to discuss options for medical weight management.  Ideally we would have your lab results available for that visit.        Preventive Health Recommendations  Female Ages 40 to 49    Yearly exam:   See your health care provider every year in order to  Review health changes.   Discuss preventive care.    Review your medicines if your doctor prescribed any.    Get a Pap test every three years (unless you have an abnormal result and your provider advises testing more often).    If you get Pap tests with HPV test, you only need to test every 5 years, unless you have an abnormal result. You do not need a Pap test if your uterus was removed (hysterectomy) and you have not had cancer.    You should be tested each year for STDs (sexually transmitted diseases), if you're at risk.   Ask your doctor if you should have a mammogram.    Have a colonoscopy (test for colon cancer) if someone in your family has had colon cancer or polyps before age 50.     Have a cholesterol test every 5 years.     Have a diabetes test (fasting glucose) after age 45. If you are at risk for diabetes, you should have this test every 3 years.    Shots: Get a flu shot each year. Get a tetanus shot every 10 years.     Nutrition:   Eat at least 5 servings of fruits and vegetables each day.  Eat whole-grain bread, whole-wheat pasta and brown rice instead of white grains and rice.  Get adequate Calcium and Vitamin D.      Lifestyle  Exercise at least 150 minutes a week (an average of 30 minutes a day, 5 days a week). This will help you control your  weight and prevent disease.  Limit alcohol to one drink per day.  No smoking.   Wear sunscreen to prevent skin cancer.  See your dentist every six months for an exam and cleaning.

## 2022-05-18 ENCOUNTER — ANCILLARY PROCEDURE (OUTPATIENT)
Dept: MRI IMAGING | Facility: CLINIC | Age: 42
End: 2022-05-18
Attending: FAMILY MEDICINE
Payer: COMMERCIAL

## 2022-05-18 DIAGNOSIS — M54.50 CHRONIC MIDLINE LOW BACK PAIN WITHOUT SCIATICA: ICD-10-CM

## 2022-05-18 DIAGNOSIS — G89.29 CHRONIC MIDLINE LOW BACK PAIN WITHOUT SCIATICA: ICD-10-CM

## 2022-05-18 PROCEDURE — 72148 MRI LUMBAR SPINE W/O DYE: CPT | Mod: TC | Performed by: RADIOLOGY

## 2022-05-24 ENCOUNTER — MYC MEDICAL ADVICE (OUTPATIENT)
Dept: FAMILY MEDICINE | Facility: CLINIC | Age: 42
End: 2022-05-24
Payer: COMMERCIAL

## 2022-05-24 DIAGNOSIS — G89.29 CHRONIC MIDLINE LOW BACK PAIN WITHOUT SCIATICA: Primary | ICD-10-CM

## 2022-05-24 DIAGNOSIS — M54.50 CHRONIC MIDLINE LOW BACK PAIN WITHOUT SCIATICA: Primary | ICD-10-CM

## 2022-06-09 ENCOUNTER — OFFICE VISIT (OUTPATIENT)
Dept: PHYSICAL MEDICINE AND REHAB | Facility: CLINIC | Age: 42
End: 2022-06-09
Attending: FAMILY MEDICINE
Payer: COMMERCIAL

## 2022-06-09 VITALS — WEIGHT: 257 LBS | BODY MASS INDEX: 41.36 KG/M2 | DIASTOLIC BLOOD PRESSURE: 82 MMHG | SYSTOLIC BLOOD PRESSURE: 130 MMHG

## 2022-06-09 DIAGNOSIS — M43.16 SPONDYLOLISTHESIS OF LUMBAR REGION: ICD-10-CM

## 2022-06-09 DIAGNOSIS — M51.369 DDD (DEGENERATIVE DISC DISEASE), LUMBAR: ICD-10-CM

## 2022-06-09 DIAGNOSIS — M54.50 CHRONIC MIDLINE LOW BACK PAIN WITHOUT SCIATICA: ICD-10-CM

## 2022-06-09 DIAGNOSIS — M51.26 LUMBAR DISC HERNIATION: Primary | ICD-10-CM

## 2022-06-09 DIAGNOSIS — M47.816 LUMBAR FACET ARTHROPATHY: ICD-10-CM

## 2022-06-09 DIAGNOSIS — Q76.49 TRANSITIONAL VERTEBRA: ICD-10-CM

## 2022-06-09 DIAGNOSIS — G89.29 CHRONIC MIDLINE LOW BACK PAIN WITHOUT SCIATICA: ICD-10-CM

## 2022-06-09 PROCEDURE — 99204 OFFICE O/P NEW MOD 45 MIN: CPT | Performed by: PHYSICAL MEDICINE & REHABILITATION

## 2022-06-09 RX ORDER — MELOXICAM 7.5 MG/1
7.5 TABLET ORAL DAILY PRN
Qty: 30 TABLET | Refills: 1 | Status: SHIPPED | OUTPATIENT
Start: 2022-06-09 | End: 2022-07-26

## 2022-06-09 ASSESSMENT — PAIN SCALES - GENERAL: PAINLEVEL: EXTREME PAIN (8)

## 2022-06-09 NOTE — PROGRESS NOTES
Assessment/Plan:      Shannon was seen today for back pain.    Diagnoses and all orders for this visit:    Lumbar disc herniation  -     XR Lumbar Bending Only 2/3 Views; Future  -     Pain Transforaminal Светлана Inj Lmbscrl 1 Lvl Prieto; Future  -     Physical Therapy Referral; Future    Chronic midline low back pain without sciatica  -     Spine Referral  -     meloxicam (MOBIC) 7.5 MG tablet; Take 1 tablet (7.5 mg) by mouth daily as needed for pain  -     Physical Therapy Referral; Future    DDD (degenerative disc disease), lumbar  -     meloxicam (MOBIC) 7.5 MG tablet; Take 1 tablet (7.5 mg) by mouth daily as needed for pain  -     Pain Transforaminal Светлана Inj Lmbscrl 1 Lvl Prieto; Future  -     Physical Therapy Referral; Future    Lumbar facet arthropathy  -     Physical Therapy Referral; Future    Spondylolisthesis of lumbar region  -     XR Lumbar Bending Only 2/3 Views; Future  -     Physical Therapy Referral; Future    Transitional vertebra  -     Pain Transforaminal Светлана Inj Lmbscrl 1 Lvl Prieto; Future  -     Physical Therapy Referral; Future         Assessment: Pleasant 42 year old female with history of depression with:    1.  Chronic lumbar spine pain lumbosacral junction with prior right S1 radicular symptoms.  She has a transitional L5 versus S1 vertebrae with broad-based disc bulge and degenerative disc disease and Modic endplate changes at L4-5 with central disc bulge and superimposed central disc extrusion along with facet arthropathy and trace spondylolisthesis all of which contribute to her low back pain.  Her radicular symptoms have resolved and she has ongoing loss of right Achilles reflex which is consistent with her prior history.      Discussion:    1.  I discussed the diagnosis and treatment options.  Discussed the options of imaging along with interventions therapy injections and surgical evaluation.  She wants to start conservatively.    2.  Start physical therapy for lumbar core strengthening  stabilization.    3.  Flexion-extension x-rays to evaluate for instability given the significant degenerative changes.    4.  Trial meloxicam 7.5 mg daily to help with pain.    5.  Trial of bilateral L4-5 transforaminal epidural steroid injection    6.  Can consider facet medial branch blocks if epidural is not helpful.    7.  Follow-up with me 2 to 4 weeks after injection.      It was our pleasure caring for your patient today, if there any questions or concerns please do not hesitate to contact us.      Subjective:   Patient ID: Shannon Quiroz is a 42 year old female.    History of Present Illness: Patient presents at the request of Dr. Maldonado for evaluation of chronic low back pain.  Her pain started about 8 years ago while she was pregnant.  She has described right lumbar paresthesias  stated she had an MRI done, had a lumbar epidural and was able to deliver.  She had pain down her right leg.  Has been doing fairly well for the past 8 years with waxing waning low back pain treated with chiropractic.    Had COVID in December 2021 was coughing fairly significantly and increased pain.  Was seen by chiropractor which did not help.  Her pain has remained constant for the past 6 months lumbosacral junction radiating to the gluteal region bilaterally worse first thing in the morning difficult to bend get out of bed.  Worse with laying on her stomach.  Better with getting up and moving and heat.  No current down the legs other than the gluteal region bilaterally.    Pain is an 8/10 today, 10/10 at worst and 2/10 at best.  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  She still has some numbness in the right second toe residual from prior radicular symptoms but otherwise nothing new.    Imaging: MRI report and images were personally reviewed and discussed with the patient.  A plastic model was utilized during the discussion.  MRI of theLumbar spine personally  reviewed.  This shows transitional L5 segment sacralized.  2 mm retrolisthesis of L4 on L5.  Moderate disc height loss L4-5 with Modic endplate changes.  Moderate facet arthropathy.  Moderate broad-based disc bulge with superimposed central extrusion.  Mild foraminal stenosis bilaterally.  Mild lateral recess stenosis on the right.  L3-4 moderate facet arthropathy and moderate facet arthropathy L2-3 as well  And L1-2         Review of Systems: Patient complains of headache, joint pain, sciatica, anxiety and depression.  Denies fever, weight loss, waking, hoarseness, change in vision, chest pain, shortness of breath, abdominal pain, nausea, vomiting, bowel or bladder incontinence, skin issues, falls.  Remainder of 12 point review systems negative unless listed above.    Past Medical History:   Diagnosis Date     Calculus of kidney     Created by Conversion      Calculus of kidney     Created by Conversion      Major depression in remission (H) 10/17/2016     Major depression in remission (H) 10/17/2016     Migraine     when on OCPs     Migraine     when on OCPs     Nephrolithiasis      Sleep apnea     No longer uses a CPAP,     Sleep apnea     No longer uses a CPAP,       Family History   Problem Relation Age of Onset     Breast Cancer Maternal Grandmother      Cancer Paternal Grandfather         brain     Breast Cancer Mother 65.00     No Known Problems Father      Cancer Paternal Grandmother         lung         Social History     Socioeconomic History     Marital status:      Spouse name: None     Number of children: None     Years of education: None     Highest education level: None   Tobacco Use     Smoking status: Never Smoker     Smokeless tobacco: Never Used   Vaping Use     Vaping Use: Never used   Substance and Sexual Activity     Alcohol use: No     Drug use: No     Sexual activity: Yes     Partners: Male     Birth control/protection: Male Surgical, Surgical     Comment:  has vasectomy        The following portions of the patient's history were reviewed and updated as appropriate: allergies, current medications, past family history, past medical history, past social history, past surgical history and problem list.    Oswestry (FANG) Questionnaire    OSWESTRY DISABILITY INDEX 6/9/2022   Count 9   Sum 13   Oswestry Score (%) 28.89   Some recent data might be hidden       Neck Disability Index:  No flowsheet data found.       PHQ-2 Score:     PHQ-2 ( 1999 Pfizer) 5/3/2022 10/28/2020   Q1: Little interest or pleasure in doing things 0 0   Q2: Feeling down, depressed or hopeless 0 0   PHQ-2 Score 0 0   PHQ-2 Total Score (12-17 Years)- Positive if 3 or more points; Administer PHQ-A if positive - 0   Q1: Little interest or pleasure in doing things Not at all Not at all   Q2: Feeling down, depressed or hopeless Not at all Not at all   PHQ-2 Score 0 0                  Objective:   Physical Exam:    /82   Wt 257 lb (116.6 kg)   LMP 04/28/2022   BMI 41.36 kg/m    Body mass index is 41.36 kg/m .      General:  Well-appearing female in no acute distress.  Pleasant, cooperative, and interactive throughout the examination and interview.  CV: No lower extremity edema on inspection or paltation.  Lymphatics: No cervical lymphadenopathy palpated. Eyes: sclera clear. Skin: No rashes or lesions seen over the head/neck, hairline, arms, legs, trunk.  Respirations unlabored.  MSK: Gait is normal.  Able to heel-toe walk without difficulty.  Negative Romberg.  Spine: normal AP curves of the C, T, and L spine.  Dramatically reduced lumbar flexion finger to floor testing nearly 0 degrees of flexion.  Full extension..  Palpation: Tenderness to palpation over lumbar paraspinals L4-5 L5-S1.  Extremities: Full range of motion of the elbows, and wrists with no effusions or tenderness to palpation.   Full range of motion of the hips, knees, and ankles from seated with no effusions or tenderness to palpation.  No  hypermobility of the upper or lower extremities.  Neurologic exam: Mental status: Patient is alert and oriented with normal affect.  Attention, knowledge, memory, and language are intact.  Normal coordination throughout the examination.  Reflexes are 2+ and symmetric biceps, triceps, brachioradialis, patellar, and 0 right, 2+ left Achilles with down-going toes and Negative Art's.  Sensation is intact to light touch throughout the upper and lower extremities bilaterally.  Manual muscle testing reveals 5 out of 5 in the hip flexors, knee flexors/extensors, ankle plantar flexors, ankle  dorsiflexors, and EHL.  Upper extremities: Grossly normal strength . Normal muscle bulk and tone in the arms and legs.    Negative seated straight leg raise bilaterally.

## 2022-06-09 NOTE — LETTER
6/9/2022         RE: Shannon Quiroz  [address] [facility] [address]        Dear Colleague,    Thank you for referring your patient, Shannon Quiroz, to the [facility]. Please see a copy of my visit note below.    Assessment/Plan:      Shannon was seen today for back pain.    Diagnoses and all orders for this visit:    Lumbar disc herniation  -     XR Lumbar Bending Only 2/3 Views; Future  -     Pain Transforaminal Светлана Inj Lmbscrl 1 Lvl Prieto; Future  -     Physical Therapy Referral; Future    Chronic midline low back pain without sciatica  -     Spine Referral  -     meloxicam (MOBIC) 7.5 MG tablet; Take 1 tablet (7.5 mg) by mouth daily as needed for pain  -     Physical Therapy Referral; Future    DDD (degenerative disc disease), lumbar  -     meloxicam (MOBIC) 7.5 MG tablet; Take 1 tablet (7.5 mg) by mouth daily as needed for pain  -     Pain Transforaminal Светлана Inj Lmbscrl 1 Lvl Prieto; Future  -     Physical Therapy Referral; Future    Lumbar facet arthropathy  -     Physical Therapy Referral; Future    Spondylolisthesis of lumbar region  -     XR Lumbar Bending Only 2/3 Views; Future  -     Physical Therapy Referral; Future    Transitional vertebra  -     Pain Transforaminal Светлана Inj Lmbscrl 1 Lvl Prieto; Future  -     Physical Therapy Referral; Future         Assessment: Pleasant 42 year old female with history of depression with:    1.  Chronic lumbar spine pain lumbosacral junction with prior right S1 radicular symptoms.  She has a transitional L5 versus S1 vertebrae with broad-based disc bulge and degenerative disc disease and Modic endplate changes at L4-5 with central disc bulge and superimposed central disc extrusion along with facet arthropathy and trace spondylolisthesis all of which contribute to her low back pain.  Her radicular symptoms have resolved and she has ongoing loss of right Achilles reflex which is consistent with her prior history.      Discussion:    1.  I  discussed the diagnosis and treatment options.  Discussed the options of imaging along with interventions therapy injections and surgical evaluation.  She wants to start conservatively.    2.  Start physical therapy for lumbar core strengthening stabilization.    3.  Flexion-extension x-rays to evaluate for instability given the significant degenerative changes.    4.  Trial meloxicam 7.5 mg daily to help with pain.    5.  Trial of bilateral L4-5 transforaminal epidural steroid injection    6.  Can consider facet medial branch blocks if epidural is not helpful.    7.  Follow-up with me 2 to 4 weeks after injection.      It was our pleasure caring for your patient today, if there any questions or concerns please do not hesitate to contact us.      Subjective:   Patient ID: Shannon Quiroz is a 42 year old female.    History of Present Illness: Patient presents at the request of Dr. Maldonado for evaluation of chronic low back pain.  Her pain started about 8 years ago while she was pregnant.  She has described right lumbar paresthesias  stated she had an MRI done, had a lumbar epidural and was able to deliver.  She had pain down her right leg.  Has been doing fairly well for the past 8 years with waxing waning low back pain treated with chiropractic.    Had COVID in December 2021 was coughing fairly significantly and increased pain.  Was seen by chiropractor which did not help.  Her pain has remained constant for the past 6 months lumbosacral junction radiating to the gluteal region bilaterally worse first thing in the morning difficult to bend get out of bed.  Worse with laying on her stomach.  Better with getting up and moving and heat.  No current down the legs other than the gluteal region bilaterally.    Pain is an 8/10 today, 10/10 at worst and 2/10 at best.  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  She still has some numbness in the right second toe  residual from prior radicular symptoms but otherwise nothing new.    Imaging: MRI report and images were personally reviewed and discussed with the patient.  A plastic model was utilized during the discussion.  MRI of theLumbar spine personally reviewed.  This shows transitional L5 segment sacralized.  2 mm retrolisthesis of L4 on L5.  Moderate disc height loss L4-5 with Modic endplate changes.  Moderate facet arthropathy.  Moderate broad-based disc bulge with superimposed central extrusion.  Mild foraminal stenosis bilaterally.  Mild lateral recess stenosis on the right.  L3-4 moderate facet arthropathy and moderate facet arthropathy L2-3 as well  And L1-2         Review of Systems: Patient complains of headache, joint pain, sciatica, anxiety and depression.  Denies fever, weight loss, waking, hoarseness, change in vision, chest pain, shortness of breath, abdominal pain, nausea, vomiting, bowel or bladder incontinence, skin issues, falls.  Remainder of 12 point review systems negative unless listed above.    Past Medical History:   Diagnosis Date     Calculus of kidney     Created by Conversion      Calculus of kidney     Created by Conversion      Major depression in remission (H) 10/17/2016     Major depression in remission (H) 10/17/2016     Migraine     when on OCPs     Migraine     when on OCPs     Nephrolithiasis      Sleep apnea     No longer uses a CPAP,     Sleep apnea     No longer uses a CPAP,       Family History   Problem Relation Age of Onset     Breast Cancer Maternal Grandmother      Cancer Paternal Grandfather         brain     Breast Cancer Mother 65.00     No Known Problems Father      Cancer Paternal Grandmother         lung         Social History     Socioeconomic History     Marital status:      Spouse name: None     Number of children: None     Years of education: None     Highest education level: None   Tobacco Use     Smoking status: Never Smoker     Smokeless tobacco: Never Used    Vaping Use     Vaping Use: Never used   Substance and Sexual Activity     Alcohol use: No     Drug use: No     Sexual activity: Yes     Partners: Male     Birth control/protection: Male Surgical, Surgical     Comment:  has vasectomy       The following portions of the patient's history were reviewed and updated as appropriate: allergies, current medications, past family history, past medical history, past social history, past surgical history and problem list.    Oswestry (FANG) Questionnaire    OSWESTRY DISABILITY INDEX 6/9/2022   Count 9   Sum 13   Oswestry Score (%) 28.89   Some recent data might be hidden       Neck Disability Index:  No flowsheet data found.       PHQ-2 Score:     PHQ-2 ( 1999 [employer]) 5/3/2022 10/28/2020   Q1: Little interest or pleasure in doing things 0 0   Q2: Feeling down, depressed or hopeless 0 0   PHQ-2 Score 0 0   PHQ-2 Total Score (12-17 Years)- Positive if 3 or more points; Administer PHQ-A if positive - 0   Q1: Little interest or pleasure in doing things Not at all Not at all   Q2: Feeling down, depressed or hopeless Not at all Not at all   PHQ-2 Score 0 0                  Objective:   Physical Exam:    /82   Wt 257 lb (116.6 kg)   LMP 04/28/2022   BMI 41.36 kg/m    Body mass index is 41.36 kg/m .      General:  Well-appearing female in no acute distress.  Pleasant, cooperative, and interactive throughout the examination and interview.  CV: No lower extremity edema on inspection or paltation.  Lymphatics: No cervical lymphadenopathy palpated. Eyes: sclera clear. Skin: No rashes or lesions seen over the head/neck, hairline, arms, legs, trunk.  Respirations unlabored.  MSK: Gait is normal.  Able to heel-toe walk without difficulty.  Negative Romberg.  Spine: normal AP curves of the C, T, and L spine.  Dramatically reduced lumbar flexion finger to floor testing nearly 0 degrees of flexion.  Full extension..  Palpation: Tenderness to palpation over lumbar paraspinals  L4-5 L5-S1.  Extremities: Full range of motion of the elbows, and wrists with no effusions or tenderness to palpation.   Full range of motion of the hips, knees, and ankles from seated with no effusions or tenderness to palpation.  No hypermobility of the upper or lower extremities.  Neurologic exam: Mental status: Patient is alert and oriented with normal affect.  Attention, knowledge, memory, and language are intact.  Normal coordination throughout the examination.  Reflexes are 2+ and symmetric biceps, triceps, brachioradialis, patellar, and 0 right, 2+ left Achilles with down-going toes and Negative Art's.  Sensation is intact to light touch throughout the upper and lower extremities bilaterally.  Manual muscle testing reveals 5 out of 5 in the hip flexors, knee flexors/extensors, ankle plantar flexors, ankle  dorsiflexors, and EHL.  Upper extremities: Grossly normal strength . Normal muscle bulk and tone in the arms and legs.    Negative seated straight leg raise bilaterally.            Again, thank you for allowing me to participate in the care of your patient.        Sincerely,        Rufino Marley, DO

## 2022-06-09 NOTE — PATIENT INSTRUCTIONS
A physical therapy order was provided for you today.  You   will be contacted by physical therapy.  If nobody contacts you within 3 to 5 days, please contact the clinic at [phone].  It will be very important for you to do your physical therapy exercises on a regular basis to decrease your pain and prevent future pain flares.   A Lumbar epidural injection has been ordered today. Please schedule this injection at least  2 weeks from now to allow time for insurance prior authorization. On the day of your injection, you cannot be sick or taking antibiotics. If you become sick and are prescribed, please call the clinic so your injection can be rescheduled for once you have completed your antibiotics. You will need to bring a  with you for your injection. If you have any questions or concerns prior to your injection, please do not hesitate to call the nurse navigation line at [phone].   An XRAY was ordered for you today.  Please call Radiology at [phone].    Meloxicam (which is an anti-inflammatory) medication is prescribed today. Take 1 tablet daily as needed for pain. This medication should be taken with food and water to prevent any stomach upset. Do not take ibuprofen/Advil/Motrin/Aleve/naproxen while you take Meloxicam.. Please call if you have any side effects.

## 2022-06-29 ENCOUNTER — RADIOLOGY INJECTION OFFICE VISIT (OUTPATIENT)
Dept: PHYSICAL MEDICINE AND REHAB | Facility: CLINIC | Age: 42
End: 2022-06-29
Attending: PHYSICAL MEDICINE & REHABILITATION
Payer: COMMERCIAL

## 2022-06-29 VITALS
SYSTOLIC BLOOD PRESSURE: 116 MMHG | TEMPERATURE: 98.2 F | DIASTOLIC BLOOD PRESSURE: 78 MMHG | HEART RATE: 88 BPM | OXYGEN SATURATION: 97 %

## 2022-06-29 DIAGNOSIS — M51.369 DDD (DEGENERATIVE DISC DISEASE), LUMBAR: ICD-10-CM

## 2022-06-29 DIAGNOSIS — Q76.49 TRANSITIONAL VERTEBRA: ICD-10-CM

## 2022-06-29 DIAGNOSIS — M51.26 LUMBAR DISC HERNIATION: ICD-10-CM

## 2022-06-29 PROCEDURE — 64483 NJX AA&/STRD TFRM EPI L/S 1: CPT | Mod: 50 | Performed by: PAIN MEDICINE

## 2022-06-29 RX ORDER — LIDOCAINE HYDROCHLORIDE 10 MG/ML
INJECTION, SOLUTION EPIDURAL; INFILTRATION; INTRACAUDAL; PERINEURAL
Status: COMPLETED | OUTPATIENT
Start: 2022-06-29 | End: 2022-06-29

## 2022-06-29 RX ORDER — DEXAMETHASONE SODIUM PHOSPHATE 10 MG/ML
INJECTION, SOLUTION INTRAMUSCULAR; INTRAVENOUS
Status: COMPLETED | OUTPATIENT
Start: 2022-06-29 | End: 2022-06-29

## 2022-06-29 RX ADMIN — LIDOCAINE HYDROCHLORIDE 4 ML: 10 INJECTION, SOLUTION EPIDURAL; INFILTRATION; INTRACAUDAL; PERINEURAL at 08:51

## 2022-06-29 RX ADMIN — DEXAMETHASONE SODIUM PHOSPHATE 20 MG: 10 INJECTION, SOLUTION INTRAMUSCULAR; INTRAVENOUS at 08:52

## 2022-06-29 ASSESSMENT — PAIN SCALES - GENERAL
PAINLEVEL: SEVERE PAIN (7)
PAINLEVEL: SEVERE PAIN (7)

## 2022-06-29 NOTE — PATIENT INSTRUCTIONS
Follow-up visit in 2-4 weeks with Dr. Marley to discuss injection outcome and determine care plan going forward.     DISCHARGE INSTRUCTIONS    During office hours (8:00 a.m.- 4:00 p.m.) questions or concerns may be answered  by calling Spine Center Navigation Nurses at  [phone].  Messages received after hours will be returned the following business day.      In the case of an emergency, please dial 911 or seek assistance at the nearest Emergency Room/Urgent Care facility.     All Patients:    You may experience an increase in your symptoms for the first 2 days (It may take anywhere between 2 days- 2 weeks for the steroid to have maximum effect).    You may use ice on the injection site, as frequently as 20 minutes each hour if needed.    You may take your pain medicine.    You may continue taking your regular medication after your injection. If you have had a Medial Branch Block you may resume pain medication once your pain diary is completed.    You may shower. No swimming, tub bath or hot tub for 48 hours.  You may remove your bandaid/bandage as soon as you are home.    You may resume light activities, as tolerated.    Resume your usual diet as tolerated.    It is strongly advised that you do not drive for 1-3 hours post injection.    If you have had oral sedation:  Do not drive for 8 hours post injection.      If you have had IV sedation:  Do not drive for 24 hours post injection.  Do not operate hazardous machinery or make important personal/business decisions for 24 hours.      POSSIBLE STEROID SIDE EFFECTS (If steroid/cortisone was used for your procedure)    -If you experience these symptoms, it should only last for a short period    Swelling of the legs              Skin redness (flushing)     Mouth (oral) irritation   Blood sugar (glucose) levels            Sweats                    Mood changes  Headache  Sleeplessness  Weakened immune system for up to 14 days, which could increase the risk of  annalisa the COVID-19 virus and/or experiencing more severe symptoms of the disease, if exposed.  Decreased effectiveness of the flu vaccine if given within 2 weeks of the steroid.         POSSIBLE PROCEDURE SIDE EFFECTS  -Call the Spine Center if you are concerned  Increased Pain           Increased numbness/tingling      Nausea/Vomiting          Bruising/bleeding at site      Redness or swelling                                              Difficulty walking      Weakness           Fever greater than 100.5    *In the event of a severe headache after an epidural steroid injection that is relieved by lying down, please call the [facility] to speak with a clinical staff member*

## 2022-07-07 ENCOUNTER — ANCILLARY PROCEDURE (OUTPATIENT)
Dept: MAMMOGRAPHY | Facility: HOSPITAL | Age: 42
End: 2022-07-07
Attending: FAMILY MEDICINE
Payer: COMMERCIAL

## 2022-07-07 ENCOUNTER — HOSPITAL ENCOUNTER (OUTPATIENT)
Dept: GENERAL RADIOLOGY | Facility: HOSPITAL | Age: 42
Discharge: HOME | End: 2022-07-07
Attending: PHYSICAL MEDICINE & REHABILITATION
Payer: COMMERCIAL

## 2022-07-07 DIAGNOSIS — M51.26 LUMBAR DISC HERNIATION: ICD-10-CM

## 2022-07-07 DIAGNOSIS — Z12.31 ENCOUNTER FOR SCREENING MAMMOGRAM FOR BREAST CANCER: ICD-10-CM

## 2022-07-07 DIAGNOSIS — M43.16 SPONDYLOLISTHESIS OF LUMBAR REGION: ICD-10-CM

## 2022-07-07 PROCEDURE — 77067 SCR MAMMO BI INCL CAD: CPT

## 2022-07-07 PROCEDURE — 72120 X-RAY BEND ONLY L-S SPINE: CPT | Mod: FY

## 2022-07-26 ENCOUNTER — OFFICE VISIT (OUTPATIENT)
Dept: PHYSICAL MEDICINE AND REHAB | Facility: CLINIC | Age: 42
End: 2022-07-26
Payer: COMMERCIAL

## 2022-07-26 ENCOUNTER — OFFICE VISIT (OUTPATIENT)
Dept: FAMILY MEDICINE | Facility: CLINIC | Age: 42
End: 2022-07-26
Payer: COMMERCIAL

## 2022-07-26 VITALS
HEART RATE: 76 BPM | WEIGHT: 259.8 LBS | RESPIRATION RATE: 18 BRPM | SYSTOLIC BLOOD PRESSURE: 152 MMHG | BODY MASS INDEX: 41.75 KG/M2 | TEMPERATURE: 98.5 F | OXYGEN SATURATION: 98 % | HEIGHT: 66 IN | DIASTOLIC BLOOD PRESSURE: 90 MMHG

## 2022-07-26 VITALS — DIASTOLIC BLOOD PRESSURE: 98 MMHG | OXYGEN SATURATION: 95 % | HEART RATE: 79 BPM | SYSTOLIC BLOOD PRESSURE: 152 MMHG

## 2022-07-26 DIAGNOSIS — M51.369 DDD (DEGENERATIVE DISC DISEASE), LUMBAR: ICD-10-CM

## 2022-07-26 DIAGNOSIS — E66.01 MORBID OBESITY (H): ICD-10-CM

## 2022-07-26 DIAGNOSIS — M43.16 SPONDYLOLISTHESIS OF LUMBAR REGION: ICD-10-CM

## 2022-07-26 DIAGNOSIS — Q76.49 TRANSITIONAL VERTEBRA: ICD-10-CM

## 2022-07-26 DIAGNOSIS — M62.08 DIASTASIS OF RECTUS ABDOMINIS: Primary | ICD-10-CM

## 2022-07-26 DIAGNOSIS — M51.26 LUMBAR DISC HERNIATION: Primary | ICD-10-CM

## 2022-07-26 PROCEDURE — 99214 OFFICE O/P EST MOD 30 MIN: CPT | Performed by: PHYSICAL MEDICINE & REHABILITATION

## 2022-07-26 PROCEDURE — 99214 OFFICE O/P EST MOD 30 MIN: CPT | Performed by: FAMILY MEDICINE

## 2022-07-26 RX ORDER — NABUMETONE 500 MG/1
500 TABLET, FILM COATED ORAL 2 TIMES DAILY PRN
Qty: 90 TABLET | Refills: 1 | Status: SHIPPED | OUTPATIENT
Start: 2022-07-26 | End: 2022-08-12

## 2022-07-26 ASSESSMENT — PAIN SCALES - GENERAL
PAINLEVEL: MILD PAIN (2)
PAINLEVEL: SEVERE PAIN (7)

## 2022-07-26 ASSESSMENT — PATIENT HEALTH QUESTIONNAIRE - PHQ9
SUM OF ALL RESPONSES TO PHQ QUESTIONS 1-9: 0
10. IF YOU CHECKED OFF ANY PROBLEMS, HOW DIFFICULT HAVE THESE PROBLEMS MADE IT FOR YOU TO DO YOUR WORK, TAKE CARE OF THINGS AT HOME, OR GET ALONG WITH OTHER PEOPLE: NOT DIFFICULT AT ALL
SUM OF ALL RESPONSES TO PHQ QUESTIONS 1-9: 0

## 2022-07-26 NOTE — LETTER
7/26/2022         RE: Shannon Quiroz  [address] [facility] [address]        Dear Colleague,    Thank you for referring your patient, Shannon Quiroz, to the [facility]UROSURGERY. Please see a copy of my visit note below.    Assessment/Plan:      Shannon was seen today for follow up.    Diagnoses and all orders for this visit:    Lumbar disc herniation  -     Neurosurgery Referral; Future  -     PAIN Interlaminar Epidural Steroid Injection Lumbar/Sacral; Future  -     nabumetone (RELAFEN) 500 MG tablet; Take 1 tablet (500 mg) by mouth 2 times daily as needed for moderate pain    DDD (degenerative disc disease), lumbar  -     PAIN Interlaminar Epidural Steroid Injection Lumbar/Sacral; Future  -     nabumetone (RELAFEN) 500 MG tablet; Take 1 tablet (500 mg) by mouth 2 times daily as needed for moderate pain    Transitional vertebra    Spondylolisthesis of lumbar region  -     nabumetone (RELAFEN) 500 MG tablet; Take 1 tablet (500 mg) by mouth 2 times daily as needed for moderate pain         Assessment: Pleasant 42 year old female with history of depression with:     1.  Chronic lumbar spine pain lumbosacral junction with prior right S1 radicular symptoms.  She has a transitional L5 versus S1 vertebrae with broad-based disc bulge and degenerative disc disease and Modic endplate changes at L4-5 with central disc bulge and superimposed central disc extrusion along with facet arthropathy and trace spondylolisthesis all of which contribute to her low back pain.  Her radicular symptoms have resolved and she has ongoing loss of right Achilles reflex which is consistent with her prior history.   3 days of very good improvement and then worsening  pain following bilateral L4-5 TFESI.  Has not started physical therapy but feels it will be very difficult due to her pain.       Discussion:    1.  Discussed the diagnosis and treatment options.  Discussed surgical options further injections, starting  therapy medications.    2.  I encouraged her to start physical therapy and the phone number was provided again today.  Even 2-4 visits to see if some exercise would be helpful will be beneficial.    3.  Trial nabumetone 500 mg twice daily as needed for pain.    4.  Trial at L5-S1 interlaminar epidural steroid injection.  She still remains in severe pain and this would help her be more mobile.  I am unsure if she would be able to participate fully in physical therapy with her current pain level.    5.  Given the disc herniation at L4-5 with severe Modic endplate changes and spondylolisthesis I recommend neurosurgical evaluation at this order has been placed.    6.  Can consider medial branch blocks in the future however no pain on facet loading today and extension actually improves her pain which would not correlate with facet mediated pain.    7.  Follow-up with me 2 to 4 weeks after injection.      It was our pleasure caring for your patient today, if there any questions or concerns please do not hesitate to contact us.      Subjective:   Patient ID: Shannon Quiroz is a 42 year old female.    History of Present Illness: Patient presents for follow-up of lumbar spine pain.  Is a lumbosacral junction and upper gluteal region bilaterally.  Had 80 to 90% relief for 3 days after bilateral L4-5 TFESI and then the pain slowly returned.  Laying in bed or slouching causes pain and it is severe in the low back lumbosacral junction no radiation down legs paresthesias or weakness.  Pain is 7/10 today 1/10 at best.  Takes Advil 800 mg daily which is very effective throughout the day.  She has been feeling muscle spasms recently in the low back and some sudden tightness or cramping in the back itself.  On exam neck step is with her pain.      Imaging: Lumbar flexion-extension x-rays images personally reviewed showing partial sacralization of L5 no instability from flexion to extension.  Advanced disc height loss L4-5.    MRI  lumbar spine reviewed as well for medical decision-making purposes revealing moderate disc height loss L4-5 central disc protrusion superimposed on broad-based disc bulge with moderate facet arthropathy results in mild foraminal and central stenosis.  Moderate facet arthropathy L3-4.  And L2-3.  Transitional segment.    Review of Systems: Pertinent positives:  None .  Pertinent negatives: No numbness, tingling or weakness.  No bowel or bladder incontinence.  No urinary retention.  No fevers, unintentional weight loss, balance changes, headaches, frequent falling, difficulty swallowing, or coordination difficulties.  All others reviewed are negative.      Prior interventions:  1.  Bilateral L4-5 TFESI    Past Medical History:   Diagnosis Date     Calculus of kidney     Created by Conversion      Calculus of kidney     Created by Conversion      Major depression in remission (H) 10/17/2016     Major depression in remission (H) 10/17/2016     Migraine     when on OCPs     Migraine     when on OCPs     Nephrolithiasis      Sleep apnea     No longer uses a CPAP,     Sleep apnea     No longer uses a CPAP,       The following portions of the patient's history were reviewed and updated as appropriate: allergies, current medications, past family history, past medical history, past social history, past surgical history and problem list.           Objective:   Physical Exam:    BP (!) 152/98   Pulse 79   SpO2 95%   There is no height or weight on file to calculate BMI.      General: Alert and oriented with normal affect. Attention, knowledge, memory, and language are intact. No acute distress.   Eyes: Sclerae are clear.  Respirations: Unlabored. CV: No lower extremity edema.  Skin: No rashes seen.    Gait:  Nonantalgic  Tenderness lumbosacral junction L4-L5-S1 paraspinals.  No pain with facet loading.  Sensation is intact to light touch throughout the   lower extremities.  Reflexes are  2+ patellar and Achilles with  downgoing toes.    Manual muscle testing reveals:  Right /Left out of 5     5/5 hip flexors  5/5 knee flexors  5/5 knee extensors  5/5 ankle plantar flexors  5/5 ankle dorsiflexors  5/5  EHL       Again, thank you for allowing me to participate in the care of your patient.        Sincerely,        Rufino Marley, DO

## 2022-07-26 NOTE — PATIENT INSTRUCTIONS
A Lumbar epidural has been ordered today. Please schedule this injection at least   2 weeks from now to allow time for insurance prior authorization. On the day of your injection, you cannot be sick or taking antibiotics. If you become sick and are prescribed, please call the clinic so your injection can be rescheduled for once you have completed your antibiotics. You will need to bring a  with you for your injection. If you have any questions or concerns prior to your injection, please do not hesitate to call the nurse navigation line at [phone].   Nabumetone (which is an anti-inflammatory) medication is prescribed today. Take 1  tablet 2 times a day as needed for pain. This medication should be taken with food and water to prevent any stomach upset. Do not take ibuprofen/Advil/Motrin/Aleve/naproxen while you take Nabumetone. Please call if you have any side effects.   3. Start Physical therapy. Please contact the clinic at [phone].  It will be very important for you to do your physical therapy exercises on a regular basis to decrease your pain and prevent future pain flares.   4. See Neurosurgery for an opinion

## 2022-07-26 NOTE — PROGRESS NOTES
A/P:      ICD-10-CM    1. Diastasis of rectus abdominis  M62.08 Physical Therapy Referral   2. Morbid obesity (H)  E66.01      Diastasis:  Reviewed this is mostly considered a cosmetic problem.  discuss improved core strength can improve her back pain.  Advised course of PT.  Referral placed.    Obesity:  reviewed diet and exercise.  Discussed her diet and calorie goals.  Reviewed consideration of intermittent fasting and discussed the principal.  Reviewed consideration of medication for weight management as well.  Needs labs done.  Will start there.  Reviewed main side effects of various options such as Qysmia, COntrave and GLP1 options.  Notes need for BP to be controlled as well.  Pt will return for BP check.  Has been previously wnl    34 minutes spent discussing condition of diastasis and medical weight management as above.      Allison Pickard is a 42 year old presenting for the following health issues:  Weight Problem (Also stomach muscles )      History of Present Illness       Reason for visit:  Stomach muscle separation  Symptoms include:  Can t support my back  Symptom intensity:  Moderate  Symptom progression:  Staying the same  Had these symptoms before:  Yes  Has tried/received treatment for these symptoms:  No  What makes it worse:  No  What makes it better:  No    She eats 2-3 servings of fruits and vegetables daily.She consumes 0 sweetened beverage(s) daily.She exercises with enough effort to increase her heart rate 9 or less minutes per day.  She exercises with enough effort to increase her heart rate 3 or less days per week.   She is taking medications regularly.    Today's PHQ-9         PHQ-9 Total Score: 0    PHQ-9 Q9 Thoughts of better off dead/self-harm past 2 weeks :   Not at all    How difficult have these problems made it for you to do your work, take care of things at home, or get along with other people: Not difficult at all     Seeing spine surgery for management of her lumbar disc  "herniation.  Planning on another steroid injection and possibly surgery    Feels like she cannot support her back when she bends over.  Feels like she has never been able to support her back with her abdominal muscles.    Visited with a friend who thought that \"muscle separation\" was her problem.  Has done some research about PT vs surgery for diastasis  Has been reading about diastasis repair with \"tummy tuck\" to help to jump start her weight loss.      Feels she has been \"looking at all the avenues\"      Concern - weight  gain  Onset: progressively since September 2021  Description: weight gain  Intensity: mild  Progression of Symptoms:  same  Accompanying Signs & Symptoms: weight gain,back pain  Previous history of similar problem: no  Precipitating factors:        Worsened by: no  Alleviating factors:        Improved by: no  Therapies tried and outcome: None    Feels like she has been \"trying to watch what she eats\" over the last few weeks (with the exception of her vacation for 1 week)    In 2012 was able to lose 65 pounds and got to 155.  Still had a \"hanging stomach\".  In 2012 started running and was running about 3 miles 3 times per week.  Felt she made \"small modifications\" to what she was eating.  Mainly portion control    Over the last month she has been trying to watch her calorie intake.  Was trying to get 1597-6946 clark per day.  Tried to hit 1700 clark per day.  Hs not seen any weight loss at all.    Was working on smaller portions more frequently.  Feels good, enjoys these foods and feels full on this many calories    Breakfast: multigrain toast with avocado and feta  Snack: grapes and cottage cheese  Lunch: salad (salsa, corn, beans, avocado, cheese, greens), small amount tortilla chips  Snack: nuts/dried fruit or yogurt  Dinner: rice, frozen vegetables, peanuts, teriyaki    Struggles when someone wants to go out to eat or when on vacation  Also struggles the week before her period.  Fast food about " "once per week, restaurant about once per month.      Review of Systems   Constitutional, HEENT, cardiovascular, pulmonary, gi and gu systems are negative, except as otherwise noted.      Objective    BP (!) 152/90 (BP Location: Right arm, Patient Position: Sitting, Cuff Size: Adult Large)   Pulse 76   Temp 98.5  F (36.9  C) (Tympanic)   Resp 18   Ht 1.679 m (5' 6.1\")   Wt 117.8 kg (259 lb 12.8 oz)   SpO2 98%   BMI 41.81 kg/m    Body mass index is 41.81 kg/m .  Physical Exam   PE:  VS as above   Gen:  WN/WD/WH female in NAD   Abd: soft, postive bowel sounds, NT/ND, no HSM, no rebounding/guarding/ridigity, on contraction keel stomach noted      Epic reviewed                .  ..  "

## 2022-07-26 NOTE — PROGRESS NOTES
Assessment/Plan:      Shannon was seen today for follow up.    Diagnoses and all orders for this visit:    Lumbar disc herniation  -     Neurosurgery Referral; Future  -     PAIN Interlaminar Epidural Steroid Injection Lumbar/Sacral; Future  -     nabumetone (RELAFEN) 500 MG tablet; Take 1 tablet (500 mg) by mouth 2 times daily as needed for moderate pain    DDD (degenerative disc disease), lumbar  -     PAIN Interlaminar Epidural Steroid Injection Lumbar/Sacral; Future  -     nabumetone (RELAFEN) 500 MG tablet; Take 1 tablet (500 mg) by mouth 2 times daily as needed for moderate pain    Transitional vertebra    Spondylolisthesis of lumbar region  -     nabumetone (RELAFEN) 500 MG tablet; Take 1 tablet (500 mg) by mouth 2 times daily as needed for moderate pain         Assessment: Pleasant 42 year old female with history of depression with:     1.  Chronic lumbar spine pain lumbosacral junction with prior right S1 radicular symptoms.  She has a transitional L5 versus S1 vertebrae with broad-based disc bulge and degenerative disc disease and Modic endplate changes at L4-5 with central disc bulge and superimposed central disc extrusion along with facet arthropathy and trace spondylolisthesis all of which contribute to her low back pain.  Her radicular symptoms have resolved and she has ongoing loss of right Achilles reflex which is consistent with her prior history.   3 days of very good improvement and then worsening  pain following bilateral L4-5 TFESI.  Has not started physical therapy but feels it will be very difficult due to her pain.       Discussion:    1.  Discussed the diagnosis and treatment options.  Discussed surgical options further injections, starting therapy medications.    2.  I encouraged her to start physical therapy and the phone number was provided again today.  Even 2-4 visits to see if some exercise would be helpful will be beneficial.    3.  Trial nabumetone 500 mg twice daily as needed for  pain.    4.  Trial at L5-S1 interlaminar epidural steroid injection.  She still remains in severe pain and this would help her be more mobile.  I am unsure if she would be able to participate fully in physical therapy with her current pain level.    5.  Given the disc herniation at L4-5 with severe Modic endplate changes and spondylolisthesis I recommend neurosurgical evaluation at this order has been placed.    6.  Can consider medial branch blocks in the future however no pain on facet loading today and extension actually improves her pain which would not correlate with facet mediated pain.    7.  Follow-up with me 2 to 4 weeks after injection.      It was our pleasure caring for your patient today, if there any questions or concerns please do not hesitate to contact us.      Subjective:   Patient ID: Shannon Quiroz is a 42 year old female.    History of Present Illness: Patient presents for follow-up of lumbar spine pain.  Is a lumbosacral junction and upper gluteal region bilaterally.  Had 80 to 90% relief for 3 days after bilateral L4-5 TFESI and then the pain slowly returned.  Laying in bed or slouching causes pain and it is severe in the low back lumbosacral junction no radiation down legs paresthesias or weakness.  Pain is 7/10 today 1/10 at best.  Takes Advil 800 mg daily which is very effective throughout the day.  She has been feeling muscle spasms recently in the low back and some sudden tightness or cramping in the back itself.  On exam neck step is with her pain.      Imaging: Lumbar flexion-extension x-rays images personally reviewed showing partial sacralization of L5 no instability from flexion to extension.  Advanced disc height loss L4-5.    MRI lumbar spine reviewed as well for medical decision-making purposes revealing moderate disc height loss L4-5 central disc protrusion superimposed on broad-based disc bulge with moderate facet arthropathy results in mild foraminal and central stenosis.   Moderate facet arthropathy L3-4.  And L2-3.  Transitional segment.    Review of Systems: Pertinent positives:  None .  Pertinent negatives: No numbness, tingling or weakness.  No bowel or bladder incontinence.  No urinary retention.  No fevers, unintentional weight loss, balance changes, headaches, frequent falling, difficulty swallowing, or coordination difficulties.  All others reviewed are negative.      Prior interventions:  1.  Bilateral L4-5 TFESI    Past Medical History:   Diagnosis Date     Calculus of kidney     Created by Conversion      Calculus of kidney     Created by Conversion      Major depression in remission (H) 10/17/2016     Major depression in remission (H) 10/17/2016     Migraine     when on OCPs     Migraine     when on OCPs     Nephrolithiasis      Sleep apnea     No longer uses a CPAP,     Sleep apnea     No longer uses a CPAP,       The following portions of the patient's history were reviewed and updated as appropriate: allergies, current medications, past family history, past medical history, past social history, past surgical history and problem list.           Objective:   Physical Exam:    BP (!) 152/98   Pulse 79   SpO2 95%   There is no height or weight on file to calculate BMI.      General: Alert and oriented with normal affect. Attention, knowledge, memory, and language are intact. No acute distress.   Eyes: Sclerae are clear.  Respirations: Unlabored. CV: No lower extremity edema.  Skin: No rashes seen.    Gait:  Nonantalgic  Tenderness lumbosacral junction L4-L5-S1 paraspinals.  No pain with facet loading.  Sensation is intact to light touch throughout the   lower extremities.  Reflexes are  2+ patellar and Achilles with downgoing toes.    Manual muscle testing reveals:  Right /Left out of 5     5/5 hip flexors  5/5 knee flexors  5/5 knee extensors  5/5 ankle plantar flexors  5/5 ankle dorsiflexors  5/5  EHL

## 2022-07-27 ENCOUNTER — LAB (OUTPATIENT)
Dept: LAB | Facility: CLINIC | Age: 42
End: 2022-07-27
Payer: COMMERCIAL

## 2022-07-27 ENCOUNTER — THERAPY VISIT (OUTPATIENT)
Dept: PHYSICAL THERAPY | Facility: CLINIC | Age: 42
End: 2022-07-27
Attending: PHYSICAL MEDICINE & REHABILITATION
Payer: COMMERCIAL

## 2022-07-27 DIAGNOSIS — G89.29 CHRONIC MIDLINE LOW BACK PAIN WITHOUT SCIATICA: Primary | ICD-10-CM

## 2022-07-27 DIAGNOSIS — M51.369 DDD (DEGENERATIVE DISC DISEASE), LUMBAR: ICD-10-CM

## 2022-07-27 DIAGNOSIS — M54.50 CHRONIC MIDLINE LOW BACK PAIN WITHOUT SCIATICA: Primary | ICD-10-CM

## 2022-07-27 DIAGNOSIS — E66.01 MORBID OBESITY (H): ICD-10-CM

## 2022-07-27 DIAGNOSIS — Q76.49 TRANSITIONAL VERTEBRA: ICD-10-CM

## 2022-07-27 DIAGNOSIS — M47.816 LUMBAR FACET ARTHROPATHY: ICD-10-CM

## 2022-07-27 DIAGNOSIS — Z13.6 CARDIOVASCULAR SCREENING; LDL GOAL LESS THAN 160: ICD-10-CM

## 2022-07-27 DIAGNOSIS — M51.26 LUMBAR DISC HERNIATION: ICD-10-CM

## 2022-07-27 DIAGNOSIS — R79.0 LOW FERRITIN: ICD-10-CM

## 2022-07-27 DIAGNOSIS — M43.16 SPONDYLOLISTHESIS OF LUMBAR REGION: ICD-10-CM

## 2022-07-27 LAB
ALBUMIN SERPL-MCNC: 3.7 G/DL (ref 3.4–5)
ALP SERPL-CCNC: 62 U/L (ref 40–150)
ALT SERPL W P-5'-P-CCNC: 36 U/L (ref 0–50)
ANION GAP SERPL CALCULATED.3IONS-SCNC: 5 MMOL/L (ref 3–14)
AST SERPL W P-5'-P-CCNC: 22 U/L (ref 0–45)
BILIRUB SERPL-MCNC: 0.8 MG/DL (ref 0.2–1.3)
BUN SERPL-MCNC: 18 MG/DL (ref 7–30)
CALCIUM SERPL-MCNC: 8.4 MG/DL (ref 8.5–10.1)
CHLORIDE BLD-SCNC: 109 MMOL/L (ref 94–109)
CHOLEST SERPL-MCNC: 141 MG/DL
CO2 SERPL-SCNC: 24 MMOL/L (ref 20–32)
CREAT SERPL-MCNC: 0.64 MG/DL (ref 0.52–1.04)
FASTING STATUS PATIENT QL REPORTED: YES
FERRITIN SERPL-MCNC: 137 NG/ML (ref 12–150)
GFR SERPL CREATININE-BSD FRML MDRD: >90 ML/MIN/1.73M2
GLUCOSE BLD-MCNC: 102 MG/DL (ref 70–99)
HBA1C MFR BLD: 5.2 % (ref 0–5.6)
HDLC SERPL-MCNC: 48 MG/DL
LDLC SERPL CALC-MCNC: 68 MG/DL
NONHDLC SERPL-MCNC: 93 MG/DL
POTASSIUM BLD-SCNC: 3.5 MMOL/L (ref 3.4–5.3)
PROT SERPL-MCNC: 6.7 G/DL (ref 6.8–8.8)
SODIUM SERPL-SCNC: 138 MMOL/L (ref 133–144)
T4 FREE SERPL-MCNC: 1.01 NG/DL (ref 0.76–1.46)
TRIGL SERPL-MCNC: 126 MG/DL
TSH SERPL DL<=0.005 MIU/L-ACNC: 4.2 MU/L (ref 0.4–4)

## 2022-07-27 PROCEDURE — 97110 THERAPEUTIC EXERCISES: CPT | Mod: 59 | Performed by: PHYSICAL THERAPIST

## 2022-07-27 PROCEDURE — 80061 LIPID PANEL: CPT

## 2022-07-27 PROCEDURE — 97530 THERAPEUTIC ACTIVITIES: CPT | Mod: GP | Performed by: PHYSICAL THERAPIST

## 2022-07-27 PROCEDURE — 83036 HEMOGLOBIN GLYCOSYLATED A1C: CPT

## 2022-07-27 PROCEDURE — 36415 COLL VENOUS BLD VENIPUNCTURE: CPT

## 2022-07-27 PROCEDURE — 97161 PT EVAL LOW COMPLEX 20 MIN: CPT | Mod: 59 | Performed by: PHYSICAL THERAPIST

## 2022-07-27 PROCEDURE — 84439 ASSAY OF FREE THYROXINE: CPT

## 2022-07-27 PROCEDURE — 80053 COMPREHEN METABOLIC PANEL: CPT

## 2022-07-27 PROCEDURE — 82728 ASSAY OF FERRITIN: CPT

## 2022-07-27 PROCEDURE — 84443 ASSAY THYROID STIM HORMONE: CPT

## 2022-07-27 NOTE — PROGRESS NOTES
"[facility] Physical Therapy Initial Evaluation  7/27/2022     Precautions/Restrictions/MD instructions: evaluate and treat; Up to  2 x/week for 6 weeks, Core program and nerve glides per MD order    Therapist Assessment: Shannon Quiroz is a 42 year old female patient presenting to Physical Therapy with acute on chronic low back pain. Patient demonstrates decreased strength, decreased ROM, impaired functional movement. Signs and symptoms are consistent with chronic LBP with mobility and strength deficits. These impairments limit their ability to sit, stand, transfer, bend, get dressed. Skilled PT services are necessary in order to reduce impairments and improve independent function.    SUBJECTIVE    Chief Complaint: acute on chronic LBP.  Can go a year or so with no symptoms and then something can trigger it and will have pain.  Generally visits with a chiropractor for 2-3 visits and then is fine.  Has been like this for 7 years with no concerns.  Associated symptoms: stiffness, diastasis recti  Paresthesia (yes/no):  no  Onset date: date of MD order 7/26/22; this episode began in December 2021 after having COVID and coughed a lot  Symptoms commenced as a result of: coughing   Condition occurred in the following environment: home   Pain severity: 2/10 at rest; 2/10 current, 9/10 worst  Location of pain:  Central low back, bilateral hips, history of R radicular symptoms (now resolved)  Quality of Pain: constant  Better: advil, lounging backward in couch, laying in bed  Worse:  bending forward/over, standing up after lounging, moving from side/side in bed, putting socks/pants on  Time of day: mornings are worst  Progression of Symptoms since onset:  Since onset, these symptoms are stable  Previous treatment: has included chiropractic - 3 X week for a while, pt reports he has \"done all he could do\"; benefit in the past but no effect this time  Current Functional Status: impaired  Previous Functional Status:  " fully functional prior to pain onset/injury.  Functional disability score (FANG/STarT Back):  FANG 36%, Katharina Medium    General health as reported by patient: good.    PMH: overweight, migraines, sleep disorder/apnea   Surgical history/trauma: None. She denies any significant current illness or recent hospital admissions. She denies any regional implanted devices.  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[email or web address] Job duties: standing, movement  Current exercise regimen/Recreational activities: none since December; [id number] was keeping up with semi-regular workouts (brisk walking, cardio, core work, machines)  Barriers to treatment: pain    Red Flags: (Bold when present) - reviewed the following and denies  Cauda equina: progressive motor or sensory loss, urinary retention or overflow incontinence, new fecal incontinence, saddle anesthesia, significant motor deficits encompassing multiple nerve roots  Fracture: Significant trauma, prolonged corticosteroid use, osteoporosis, age >70  Infection: spinal procedure in the last 12 months, IV drug use, immunosuppression, fever, wound in spinal region, generally unwell  Malignancy: history of metastatic cancer, unexplained weight loss      Patient's Goal(s): be able to move - daily activities - without pain, return to physical activity including increased ability to do her choreography work    OBJECTIVE    Posture:   Sitting (good/fair/poor): fair  Standing (good/fair/poor): fair   Lordosis (red/acc/normal): normal  Correction of posture (better/worse/no effect): NE    Lateral Shift (right/left/nil): nil  Relevant (yes/no):   NA  Other Observations: moves very stiffly, does not allow any trunk flexion for functional movement; sit<>supine transfer very labored, lays back while wincing, significant abdominal doming noted  Diastasis recti - 3 fingers at umbilicus, 2.5 fingers up to sternum    Neurological:    Motor deficit:  Pt able to create force all directions in sitting, limited by pain and position (all painful in sitting/loaded position but less so in supine)  Reflexes:  NT - see physiatrist note  Sensory deficit:  NT - see physiatrist note  Dural signs:  NT - see physiatrist note    Movement Loss:   Dwain Mod Min Nil Pain   Flexion X - no trunk flexion    Increased central low back pain   Extension  X   NE   Side Gliding R  X   Slight increase central low back pain   Side Gliding L  X   Slight increase central low back pain     Static Tests:  Sitting slouched:  NT  Sitting erect:  NE  Standing slouched NT  Standing erect:  NT  Lying prone in extension:  Abolished, worse-->NW, NE pain but improved ROM following  Long sitting:  NT    Hip screen: PROM bilat hip flex 95, abd 35, IR 35, ER 45 - slight increase in pain with rotation  MMT in supine flex 4/5 bilat with significant pelvic/trunk movement, supine hip abd 4/5 bilat      Spring testing +++ L4, ++ L5    Provisional Classification:  Inconclusive/Other - Inflammatory    Principle of Management:  Education:  Role of PT, components of problem (chemical, mechanical/movement)  Equipment provided:  none  Mechanical therapy (Y/N):  Y   Extension principle:  Yes - prone lying followed by EDSON Lateral Principle:  no  Flexion principle:  no  Other:  Mid-range movement, functional return, strengthening    ASSESSMENT/PLAN  Patient is a 42 year old female with lumbar complaints.    Patient has the following significant findings with corresponding treatment plan.                Diagnosis 1: chronic LBP with mobility and strength deficits     Pain -  hot/cold therapy, manual therapy, education,  directional preference exercise and home program  Decreased ROM/flexibility - manual therapy, therapeutic exercise and home program  Decreased joint mobility - manual therapy, therapeutic exercise and home program  Decreased strength - therapeutic exercise, therapeutic activities and home program  Impaired muscle performance - neuro re-education and home program  Impaired posture - neuro re-education and home program    Therapy Evaluation Codes:   Cumulative Therapy Evaluation is: Low complexity.    Previous and current functional limitations:  (See Goal Flow Sheet for this information)    Short term and Long term goals: (See Goal Flow Sheet for this information)     Communication ability:  Patient appears to be able to clearly communicate and understand verbal and written communication and follow directions correctly.  Treatment Explanation - The following has been discussed with the patient:   RX ordered/plan of care  Anticipated outcomes  Possible risks and side effects  This patient would benefit from PT intervention to resume normal activities.   Rehab potential is fair given high level of pain and significant loss of mobility.    Frequency:  1 X week, once daily  Duration:  for 6 weeks  Discharge Plan:  Achieve all LTG.  Independent in home treatment program.  Reach maximal therapeutic benefit.    Please refer to the daily flowsheet for treatment today, total treatment time and time spent performing 1:1 timed codes.

## 2022-08-12 ENCOUNTER — RADIOLOGY INJECTION OFFICE VISIT (OUTPATIENT)
Dept: PHYSICAL MEDICINE AND REHAB | Facility: CLINIC | Age: 42
End: 2022-08-12
Attending: PHYSICAL MEDICINE & REHABILITATION
Payer: COMMERCIAL

## 2022-08-12 VITALS
DIASTOLIC BLOOD PRESSURE: 99 MMHG | OXYGEN SATURATION: 98 % | SYSTOLIC BLOOD PRESSURE: 126 MMHG | HEART RATE: 68 BPM | TEMPERATURE: 98.4 F | HEIGHT: 66 IN | WEIGHT: 259 LBS | BODY MASS INDEX: 41.62 KG/M2

## 2022-08-12 DIAGNOSIS — M51.369 DDD (DEGENERATIVE DISC DISEASE), LUMBAR: ICD-10-CM

## 2022-08-12 DIAGNOSIS — M51.26 LUMBAR DISC HERNIATION: ICD-10-CM

## 2022-08-12 PROCEDURE — 62323 NJX INTERLAMINAR LMBR/SAC: CPT | Performed by: PAIN MEDICINE

## 2022-08-12 RX ORDER — METHYLPREDNISOLONE ACETATE 40 MG/ML
INJECTION, SUSPENSION INTRA-ARTICULAR; INTRALESIONAL; INTRAMUSCULAR; SOFT TISSUE
Status: COMPLETED | OUTPATIENT
Start: 2022-08-12 | End: 2022-08-12

## 2022-08-12 RX ORDER — LIDOCAINE HYDROCHLORIDE 10 MG/ML
INJECTION, SOLUTION EPIDURAL; INFILTRATION; INTRACAUDAL; PERINEURAL
Status: COMPLETED | OUTPATIENT
Start: 2022-08-12 | End: 2022-08-12

## 2022-08-12 RX ADMIN — METHYLPREDNISOLONE ACETATE 40 MG: 40 INJECTION, SUSPENSION INTRA-ARTICULAR; INTRALESIONAL; INTRAMUSCULAR; SOFT TISSUE at 08:28

## 2022-08-12 RX ADMIN — LIDOCAINE HYDROCHLORIDE 2 ML: 10 INJECTION, SOLUTION EPIDURAL; INFILTRATION; INTRACAUDAL; PERINEURAL at 08:28

## 2022-08-12 ASSESSMENT — PAIN SCALES - GENERAL
PAINLEVEL: MODERATE PAIN (4)
PAINLEVEL: MODERATE PAIN (4)

## 2022-08-12 NOTE — PATIENT INSTRUCTIONS
Follow-up visit in 2-4 weeks with Dr. Marley to discuss injection outcome and determine care plan going forward.     DISCHARGE INSTRUCTIONS    During office hours (8:00 a.m.- 4:00 p.m.) questions or concerns may be answered  by calling [facility] Nurses at  [phone].  Messages received after hours will be returned the following business day.      In the case of an emergency, please dial 911 or seek assistance at the nearest Emergency Room/Urgent Care facility.     All Patients:    You may experience an increase in your symptoms for the first 2 days (It may take anywhere between 2 days- 2 weeks for the steroid to have maximum effect).    You may use ice on the injection site, as frequently as 20 minutes each hour if needed.    You may take your pain medicine.    You may continue taking your regular medication after your injection. If you have had a Medial Branch Block you may resume pain medication once your pain diary is completed.    You may shower. No swimming, tub bath or hot tub for 48 hours.  You may remove your bandaid/bandage as soon as you are home.    You may resume light activities, as tolerated.    Resume your usual diet as tolerated.    It is strongly advised that you do not drive for 1-3 hours post injection.    If you have had oral sedation:  Do not drive for 8 hours post injection.      If you have had IV sedation:  Do not drive for 24 hours post injection.  Do not operate hazardous machinery or make important personal/business decisions for 24 hours.      POSSIBLE STEROID SIDE EFFECTS (If steroid/cortisone was used for your procedure)    -If you experience these symptoms, it should only last for a short period    Swelling of the legs              Skin redness (flushing)     Mouth (oral) irritation   Blood sugar (glucose) levels            Sweats                    Mood changes  Headache  Sleeplessness  Weakened immune system for up to 14 days, which could increase the risk of  annalisa the COVID-19 virus and/or experiencing more severe symptoms of the disease, if exposed.  Decreased effectiveness of the flu vaccine if given within 2 weeks of the steroid.         POSSIBLE PROCEDURE SIDE EFFECTS  -Call the Sp[facility] Center if you are concerned  Increased Pain           Increased numbness/tingling      Nausea/Vomiting          Bruising/bleeding at site      Redness or swelling                                              Difficulty walking      Weakness           Fever greater than 100.5    *In the event of a severe headache after an epidural steroid injection that is relieved by lying down, please call the [facility] to speak with a clinical staff member*

## 2022-08-15 ENCOUNTER — MEDICAL CORRESPONDENCE (OUTPATIENT)
Dept: NEUROSURGERY | Facility: CLINIC | Age: 42
End: 2022-08-15

## 2022-08-17 ENCOUNTER — THERAPY VISIT (OUTPATIENT)
Dept: PHYSICAL THERAPY | Facility: CLINIC | Age: 42
End: 2022-08-17
Payer: COMMERCIAL

## 2022-08-17 DIAGNOSIS — M43.16 SPONDYLOLISTHESIS OF LUMBAR REGION: ICD-10-CM

## 2022-08-17 DIAGNOSIS — M54.50 CHRONIC MIDLINE LOW BACK PAIN WITHOUT SCIATICA: Primary | ICD-10-CM

## 2022-08-17 DIAGNOSIS — M51.26 LUMBAR DISC HERNIATION: ICD-10-CM

## 2022-08-17 DIAGNOSIS — M47.816 LUMBAR FACET ARTHROPATHY: ICD-10-CM

## 2022-08-17 DIAGNOSIS — G89.29 CHRONIC MIDLINE LOW BACK PAIN WITHOUT SCIATICA: Primary | ICD-10-CM

## 2022-08-17 DIAGNOSIS — M51.369 DDD (DEGENERATIVE DISC DISEASE), LUMBAR: ICD-10-CM

## 2022-08-17 PROCEDURE — 97530 THERAPEUTIC ACTIVITIES: CPT | Mod: GP | Performed by: PHYSICAL THERAPIST

## 2022-08-17 PROCEDURE — 97110 THERAPEUTIC EXERCISES: CPT | Mod: GP | Performed by: PHYSICAL THERAPIST

## 2022-08-17 PROCEDURE — 97112 NEUROMUSCULAR REEDUCATION: CPT | Mod: GP | Performed by: PHYSICAL THERAPIST

## 2022-08-17 NOTE — PROGRESS NOTES
SUBJECTIVE  Subjective changes as noted by pt: Patient returns after a 3 week hiatus from PT. She reports no extra soreness after last visit. Has been changing her sit<>stand technique with no pain relief. Has been getting in<>out of bed with log roll technique sometimes but that also doesn't seem to change things. Has done some prone and EDSON about once per day - has only been doing that much because it hurts more after she stands up. Another injection (diff level) on 8/12 with no relief.   Current pain level: 0/10 (gets up to 9/10)   Changes in function:  None     Adverse reaction to treatment or activity:  None    OBJECTIVE  Changes in objective findings:  None - lumbar AROM significantly limited all directions.        ASSESSMENT  Shannon continues to require intervention to meet STG and LTG's: PT  Patient is not progressing as expected.  Response to therapy has shown lack of progress in  pain level and ROM   Progress made towards STG/LTG?  None    PLAN  Current treatment program is being advanced to more complex exercises.    PTA/ATC plan:  N/A    Please refer to the daily flowsheet for treatment today, total treatment time and time spent performing 1:1 timed codes.

## 2022-09-07 ENCOUNTER — OFFICE VISIT (OUTPATIENT)
Dept: PHYSICAL MEDICINE AND REHAB | Facility: CLINIC | Age: 42
End: 2022-09-07
Payer: COMMERCIAL

## 2022-09-07 VITALS — SYSTOLIC BLOOD PRESSURE: 134 MMHG | HEART RATE: 72 BPM | DIASTOLIC BLOOD PRESSURE: 84 MMHG

## 2022-09-07 DIAGNOSIS — Q76.49 TRANSITIONAL VERTEBRA: ICD-10-CM

## 2022-09-07 DIAGNOSIS — M54.50 LUMBAR SPINE PAIN: Primary | ICD-10-CM

## 2022-09-07 DIAGNOSIS — M51.369 DDD (DEGENERATIVE DISC DISEASE), LUMBAR: ICD-10-CM

## 2022-09-07 DIAGNOSIS — M51.26 LUMBAR DISC HERNIATION: ICD-10-CM

## 2022-09-07 DIAGNOSIS — M43.16 SPONDYLOLISTHESIS OF LUMBAR REGION: ICD-10-CM

## 2022-09-07 DIAGNOSIS — M47.816 LUMBAR FACET ARTHROPATHY: ICD-10-CM

## 2022-09-07 PROCEDURE — 99214 OFFICE O/P EST MOD 30 MIN: CPT | Performed by: PHYSICAL MEDICINE & REHABILITATION

## 2022-09-07 RX ORDER — NAPROXEN 500 MG/1
500 TABLET ORAL 2 TIMES DAILY PRN
Qty: 60 TABLET | Refills: 1 | Status: SHIPPED | OUTPATIENT
Start: 2022-09-07 | End: 2022-09-15

## 2022-09-07 ASSESSMENT — PAIN SCALES - GENERAL: PAINLEVEL: MILD PAIN (2)

## 2022-09-07 NOTE — LETTER
9/7/2022         RE: Shannon Quiroz  [address] [facility] [address]        Dear Colleague,    Thank you for referring your patient, Shannon Quiroz, to the [facility]. Please see a copy of my visit note below.    Assessment/Plan:      Shannon was seen today for back pain.    Diagnoses and all orders for this visit:    Lumbar spine pain  -     Pain Medial Branch Block Lumbar Two Lvls Prieto; Future  -     Neurosurgery Referral; Future  -     naproxen (NAPROSYN) 500 MG tablet; Take 1 tablet (500 mg) by mouth 2 times daily as needed for moderate pain    Lumbar disc herniation  -     Neurosurgery Referral; Future    DDD (degenerative disc disease), lumbar  -     Neurosurgery Referral; Future    Transitional vertebra  -     Neurosurgery Referral; Future    Spondylolisthesis of lumbar region    Lumbar facet arthropathy  -     Pain Medial Branch Block Lumbar Two Lvls Prieto; Future         Assessment: Pleasant 42 year old female with history of depression with:     1.  Persistent lumbar spine pain lumbosacral junction with prior right S1 radicular symptoms.  She has a transitional L5 versus S1 vertebrae with broad-based disc bulge and degenerative disc disease and significant Modic endplate changes at L4-5 with central disc bulge and superimposed central disc extrusion along with facet arthropathy and trace spondylolisthesis all of which contribute to her low back pain.  She has facet arthropathy L4-5 L5-S1 as well.  Her radicular symptoms have resolved and she has ongoing loss of right Achilles reflex which is consistent with her prior history.   Initial good response and then increased pain following bilateral L4-5 TFESI.   Has had some waxing and waning of her pain with the epidurals, activity/swimming, activity modifications and with physical therapy.  Has not been seen by neurosurgery.  No improvement the first couple of weeks and then a few waxing waning good days following an L5-S1  interlaminar epidural steroid injection.          Discussion:    1 we discussed the diagnosis and treatment options.  She would like to exhaust conservative management prior to surgical intervention we basically have tried epidurals physical therapy medications and activity modifications time,  Thing we have not done his medial branch blocks and she does have some facet arthropathy in the lower lumbar spine.    2.  Recommend bilateral L3, 4, 5 medial branch blocks to treat any contribution from facet arthropathy to her pain condition.    3.  Trial naproxen as needed for pain in place of ibuprofen.    4.  We will refer her to neurosurgery for their opinion regarding surgery for her transitional level with pain L4-5 significant Modic endplate changes and spondylolisthesis.      It was our pleasure caring for your patient today, if there any questions or concerns please do not hesitate to contact us.      Subjective:   Patient ID: Shannon Quiroz is a 42 year old female.    History of Present Illness: Patient presents for follow-up of low back pain after lumbar interlaminar epidural steroid injection.  This was done on August 12, 2022.  For 2 weeks she had no improvement in symptoms.  Then she went to [place] on a trip and spent some time in the pool and had a couple of good days intermittently after being in the pool with the decrease gravity in her lumbar spine.  Her pain still is at the lumbosacral junction in the midline.  No radiation down the legs but she does get some pain laterally to the hips occasionally with standing walking or bending.  Takes Advil intermittently.  Pain is a 9/10 at worst 2/10 today and 1/10 at best.  Has not yet seen neurosurgery.  Has had pain since last year approximately 10 months.  Is becoming intrusive in her quality of life.  She describes the pain is quite severe on her bad days.  She denies bowel or bladder incontinence although her pain can be quite severe when sitting down while  engaging her pelvis to hold her urine.    No initial benefit followed by some waxing waning benefit L5-S1 interlaminar epidural steroid injection August 12, 2022.  Has continued physical therapy.  No visit with neurosurgery as of yet.    Imaging: MRI lumbar spine images personally reviewed From May 2022.  This shows normal disks L3-4 through T12-L1 as far as disc height broad-based disc bulge at L3-4 moderate facet arthropathy.  L4-5 moderate disc height loss broad-based disc bulge with central disc herniation superimposed on moderate facet arthropathy.  Transitional L5-S1 disc.  Images reviewed for medical decision-making purposes.    Review of Systems: Pertinent positives: Gets headaches.  Pertinent negatives: No numbness, tingling or weakness.  No bowel or bladder incontinence.  No urinary retention.  No fevers, unintentional weight loss, balance changes,   frequent falling, difficulty swallowing, or coordination difficulties.  All others reviewed are negative.      Prior interventions:  1.  Bilateral L4-5 TFESI  2.  Interlaminar L5-S1 ELISABETH    Past Medical History:   Diagnosis Date     Calculus of kidney     Created by Conversion      Calculus of kidney     Created by Conversion      Major depression in remission (H) 10/17/2016     Major depression in remission (H) 10/17/2016     Migraine     when on OCPs     Migraine     when on OCPs     Nephrolithiasis      Sleep apnea     No longer uses a CPAP,     Sleep apnea     No longer uses a CPAP,       The following portions of the patient's history were reviewed and updated as appropriate: allergies, current medications, past family history, past medical history, past social history, past surgical history and problem list.           Objective:   Physical Exam:    /84   Pulse 72   There is no height or weight on file to calculate BMI.      General: Alert and oriented with normal affect. Attention, knowledge, memory, and language are intact. No acute distress.    Eyes: Sclerae are clear.  Respirations: Unlabored. CV: No lower extremity edema.  Skin: No rashes seen.    Gait:  Nonantalgic  Full range of motion of the hips from seated.  Tenderness palpation midline L4-5 L5-S1.  No tenderness over the lumbar paraspinals or gluteal tissues.  Sensation is intact to light touch throughout the  lower extremities.  Reflexes are  2+ patellar and Achilles with downgoing toes.    Manual muscle testing reveals:  Right /Left out of 5     5/5 hip flexors  5/5 knee flexors  5/5 knee extensors  5/5 ankle plantar flexors  5/5 ankle dorsiflexors  5/5  EHL       Again, thank you for allowing me to participate in the care of your patient.        Sincerely,        Rufino Marley, DO

## 2022-09-07 NOTE — PATIENT INSTRUCTIONS
Naproxen (which is an anti-inflammatory) medication is prescribed today. Take 1 tablet 2 times a day as needed for pain.This medication should be taken with food and water to prevent any stomach upset. Do not take ibuprofen/Advil/Motrin/Aleve  while you take Naproxen. Please call if you have any side effects.   See Neurosurgery for an opinion  A lumbar medical branch block has been ordered today. Please schedule this injection at least 1-2 weeks from now to allow time for insurance prior authorization. On the day of your injection, you cannot be sick or taking antibiotics. If you become sick and are prescribed, please call the clinic so your injection can be rescheduled for once you have completed your antibiotics. You will need to bring a  with you for your injection. If you have any questions or concerns prior to your injection, please do not hesitate to call the nurse navigation line at [phone].

## 2022-09-07 NOTE — PROGRESS NOTES
Assessment/Plan:      Shannon was seen today for back pain.    Diagnoses and all orders for this visit:    Lumbar spine pain  -     Pain Medial Branch Block Lumbar Two Lvls Prieto; Future  -     Neurosurgery Referral; Future  -     naproxen (NAPROSYN) 500 MG tablet; Take 1 tablet (500 mg) by mouth 2 times daily as needed for moderate pain    Lumbar disc herniation  -     Neurosurgery Referral; Future    DDD (degenerative disc disease), lumbar  -     Neurosurgery Referral; Future    Transitional vertebra  -     Neurosurgery Referral; Future    Spondylolisthesis of lumbar region    Lumbar facet arthropathy  -     Pain Medial Branch Block Lumbar Two Lvls Prieto; Future         Assessment: Pleasant 42 year old female with history of depression with:     1.  Persistent lumbar spine pain lumbosacral junction with prior right S1 radicular symptoms.  She has a transitional L5 versus S1 vertebrae with broad-based disc bulge and degenerative disc disease and significant Modic endplate changes at L4-5 with central disc bulge and superimposed central disc extrusion along with facet arthropathy and trace spondylolisthesis all of which contribute to her low back pain.  She has facet arthropathy L4-5 L5-S1 as well.  Her radicular symptoms have resolved and she has ongoing loss of right Achilles reflex which is consistent with her prior history.   Initial good response and then increased pain following bilateral L4-5 TFESI.   Has had some waxing and waning of her pain with the epidurals, activity/swimming, activity modifications and with physical therapy.  Has not been seen by neurosurgery.  No improvement the first couple of weeks and then a few waxing waning good days following an L5-S1 interlaminar epidural steroid injection.          Discussion:    1 we discussed the diagnosis and treatment options.  She would like to exhaust conservative management prior to surgical intervention we basically have tried epidurals physical therapy  medications and activity modifications time,  Thing we have not done his medial branch blocks and she does have some facet arthropathy in the lower lumbar spine.    2.  Recommend bilateral L3, 4, 5 medial branch blocks to treat any contribution from facet arthropathy to her pain condition.    3.  Trial naproxen as needed for pain in place of ibuprofen.    4.  We will refer her to neurosurgery for their opinion regarding surgery for her transitional level with pain L4-5 significant Modic endplate changes and spondylolisthesis.      It was our pleasure caring for your patient today, if there any questions or concerns please do not hesitate to contact us.      Subjective:   Patient ID: Shannon Quiroz is a 42 year old female.    History of Present Illness: Patient presents for follow-up of low back pain after lumbar interlaminar epidural steroid injection.  This was done on August 12, 2022.  For 2 weeks she had no improvement in symptoms.  Then she went to [place] on a trip and spent some time in the pool and had a couple of good days intermittently after being in the pool with the decrease gravity in her lumbar spine.  Her pain still is at the lumbosacral junction in the midline.  No radiation down the legs but she does get some pain laterally to the hips occasionally with standing walking or bending.  Takes Advil intermittently.  Pain is a 9/10 at worst 2/10 today and 1/10 at best.  Has not yet seen neurosurgery.  Has had pain since last year approximately 10 months.  Is becoming intrusive in her quality of life.  She describes the pain is quite severe on her bad days.  She denies bowel or bladder incontinence although her pain can be quite severe when sitting down while engaging her pelvis to hold her urine.    No initial benefit followed by some waxing waning benefit L5-S1 interlaminar epidural steroid injection August 12, 2022.  Has continued physical therapy.  No visit with neurosurgery as of yet.    Imaging:  MRI lumbar spine images personally reviewed From May 2022.  This shows normal disks L3-4 through T12-L1 as far as disc height broad-based disc bulge at L3-4 moderate facet arthropathy.  L4-5 moderate disc height loss broad-based disc bulge with central disc herniation superimposed on moderate facet arthropathy.  Transitional L5-S1 disc.  Images reviewed for medical decision-making purposes.    Review of Systems: Pertinent positives: Gets headaches.  Pertinent negatives: No numbness, tingling or weakness.  No bowel or bladder incontinence.  No urinary retention.  No fevers, unintentional weight loss, balance changes,   frequent falling, difficulty swallowing, or coordination difficulties.  All others reviewed are negative.      Prior interventions:  1.  Bilateral L4-5 TFESI  2.  Interlaminar L5-S1 ELISABETH    Past Medical History:   Diagnosis Date     Calculus of kidney     Created by Conversion      Calculus of kidney     Created by Conversion      Major depression in remission (H) 10/17/2016     Major depression in remission (H) 10/17/2016     Migraine     when on OCPs     Migraine     when on OCPs     Nephrolithiasis      Sleep apnea     No longer uses a CPAP,     Sleep apnea     No longer uses a CPAP,       The following portions of the patient's history were reviewed and updated as appropriate: allergies, current medications, past family history, past medical history, past social history, past surgical history and problem list.           Objective:   Physical Exam:    /84   Pulse 72   There is no height or weight on file to calculate BMI.      General: Alert and oriented with normal affect. Attention, knowledge, memory, and language are intact. No acute distress.   Eyes: Sclerae are clear.  Respirations: Unlabored. CV: No lower extremity edema.  Skin: No rashes seen.    Gait:  Nonantalgic  Full range of motion of the hips from seated.  Tenderness palpation midline L4-5 L5-S1.  No tenderness over the lumbar  paraspinals or gluteal tissues.  Sensation is intact to light touch throughout the  lower extremities.  Reflexes are  2+ patellar and Achilles with downgoing toes.    Manual muscle testing reveals:  Right /Left out of 5     5/5 hip flexors  5/5 knee flexors  5/5 knee extensors  5/5 ankle plantar flexors  5/5 ankle dorsiflexors  5/5  EHL

## 2022-09-15 ENCOUNTER — OFFICE VISIT (OUTPATIENT)
Dept: NEUROSURGERY | Facility: CLINIC | Age: 42
End: 2022-09-15
Payer: COMMERCIAL

## 2022-09-15 VITALS
HEIGHT: 66 IN | WEIGHT: 259 LBS | DIASTOLIC BLOOD PRESSURE: 85 MMHG | BODY MASS INDEX: 41.62 KG/M2 | HEART RATE: 90 BPM | OXYGEN SATURATION: 97 % | SYSTOLIC BLOOD PRESSURE: 122 MMHG

## 2022-09-15 DIAGNOSIS — M51.26 LUMBAR DISC HERNIATION: Primary | ICD-10-CM

## 2022-09-15 PROCEDURE — 99203 OFFICE O/P NEW LOW 30 MIN: CPT | Performed by: NURSE PRACTITIONER

## 2022-09-15 NOTE — PROGRESS NOTES
Neurosurgery clinic note:      A/P:  Melly is a 41yo F here for new patient consult for chronic progressive lower back pain. Reports intermittent low back pain 7 yrs ago while pregnant, got worse Dec 2021, no improvement with PT, NSAIDs, or injections.     We reviewed her imaging together, which was also reviewed by Dr Murray. She has disc degeneration at L4-5 with central disc bulge, facet arthropathy. This is likely to be the cause of her pain. No improvement so far with NSAIDs, injections, or PT. She has no instability on flex ex films. Dr Murray recommends opinion from Dr Shen about possible lumbar artificial disc replacement. Would not recommend fusion, laminectomy, or discectomy from Dr Murray's perspective at this time. Currently scheduled for MBB on 9/22, agree with proceeding. This would not interfere with a potential surgical plan, if offered.    Plan:  1. Referred to Dr Shen ([id number], [place], [place] [phone]).  2. Proceed with Medial Branch Blocks  3. Continue conservative management per DR Marley    HPI:  Melly is a 41yo F here for new patient consult for chronic progressive lower back pain. Reports intermittent low back pain 7 yrs ago while pregnant, which resolved after having a R L5-S1 injection but then got worse Dec 2021. No improvement with PT, NSAIDs, or injections. Has had TF ELISABETH gaby L4-5 6/29/22, Interlaminar ELISABETH L5-S1 8/12. Currently scheduled for MBB on 9/22. She denies leg pain, numbness, weakness, or change in bowel or bladder control.    Exam:    General: seated in NAD, appears comfortable    Neuro: PERRL, EOMs intact without nystagmus, face is symmetrical, tongue is midline, hearing intact bilaterally, shoulder shrug is strong and equal    Strength:  Hip flexors: 5/5 right, 5/5 left  Quads: 5/5 right, 5/5 left  Hamstrings: 5/5 right, 5/5 left  Dorsiflexion: 5/5 right, 5/5 left  Plantarflexion 5/5 right, 5/5 left  EHL: 5/5 right, 5/5 left    Sensation is intact to  light touch throughout lower extremities bilaterally    Reflexes:  2+ bilaterally throughout the lower extremities    No clonus, babinski, or brunson    Gait is smooth and coordinated. Heel, toe, and tandem gait intact.    No point tenderness or paraspinal musculature tenderness    Negative straight leg raises      Imaging:  Personally reviewed the following imaging studies today. Imaging also shown to patient at time of appt.  EXAM: MR LUMBAR SPINE W/O CONTRAST  LOCATION: [facility]  DATE/TIME: 5/18/2022 5:55 PM     INDICATION: Low back pain, > 6 wks.  COMPARISON: Lumbar spine 11/12/2014.  TECHNIQUE: Routine Lumbar Spine MRI without IV contrast.     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 The conus ends at T12-L1. 2 mm retrolisthesis of L4 on L5. Vertebral body heights are maintained. Nonpathologic marrow signal. Modic type I changes of L4-L5. No MRI evidence for pars defects. Mild symmetric   atrophy of the posterior paraspinal musculature. Normal diameter of the distal abdominal aorta.     T12-L1: Normal disc height and signal. No herniation. Mild bilateral facet arthropathy. No spinal canal or neural foraminal stenosis.      L1-L2: Normal disc height and signal. No herniation. Moderate bilateral facet arthropathy. No spinal canal or neural foraminal stenosis.     L2-L3: Normal disc height and signal. Shallow left paracentral/foraminal disc protrusion. Moderate bilateral facet arthropathy. No spinal canal or neural foraminal stenosis.      L3-L4: Normal intervertebral disc height and signal. Shallow broad-based disc bulge. Moderate bilateral facet arthropathy. No spinal canal narrowing. No neuroforaminal narrowing.     L4-L5: Moderate intervertebral disc height loss. Loss of the normal T2 signal within the disc. Broad-based disc bulge with superimposed central disc extrusion. Moderate bilateral facet arthropathy. No spinal canal narrowing. Mild narrowing of the right    lateral recess. Mild right neuroforaminal narrowing. Mild left neuroforaminal narrowing.     L5-S1: Transitional disc. No spinal canal narrowing. No neuroforaminal narrowing.                                                                      IMPRESSION:  1.  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 Please correlate with radiographs prior to any surgical or interventional procedures.  2.  Compared to prior lumbar spine MRI 11/12/2014, interval worsening increase in the size of the central disc extrusion at L4-L5. Mild narrowing of the right lateral recess at L4-L5.  3.  Interval worsening of the intervertebral disc height loss at L4-L5 with new Modic type I changes.  4.  No high-grade spinal canal or neuroforaminal narrowing.      EXAM: XR LUMBAR BENDING ONLY 2/3 VIEWS  LOCATION: [facility]  DATE/TIME: 7/7/2022 10:02 AM     INDICATION: Back pain. Stability evaluation.  COMPARISON: MRI lumbar spine dated 05/18/2022  TECHNIQUE: Views of the spine were obtained and reviewed.     FINDINGS:  Transitional lumbosacral anatomy with partial lumbarization of the L5 vertebral body. Straightening of usual lumbar lordosis without significant spondylolisthesis. Spinal alignment is maintained during flexion/extension. No acute fracture. Multilevel   degenerative change most prominent at L4-L5 where there is advanced disc height loss and endplate sclerosis. Cholecystectomy clips.              Lisa MCKINNEY-C  [facility]  O. [phone]

## 2022-09-15 NOTE — LETTER
9/15/2022         RE: Shannon Quiroz  [address] [facility] [address]        Dear Colleague,    Thank you for referring your patient, Shannon Quiroz, to the [facility] SPINE AND NEUROSURGERY. Please see a copy of my visit note below.    Neurosurgery clinic note:      A/P:  Melly is a 43yo F here for new patient consult for chronic progressive lower back pain. Reports intermittent low back pain 7 yrs ago while pregnant, got worse Dec 2021, no improvement with PT, NSAIDs, or injections.     We reviewed her imaging together, which was also reviewed by Dr Murray. She has disc degeneration at L4-5 with central disc bulge, facet arthropathy. This is likely to be the cause of her pain. No improvement so far with NSAIDs, injections, or PT. She has no instability on flex ex films. Dr Murray recommends opinion from Dr Shen about possible lumbar artificial disc replacement. Would not recommend fusion, laminectomy, or discectomy from Dr Murray's perspective at this time. Currently scheduled for MBB on 9/22, agree with proceeding. This would not interfere with a potential surgical plan, if offered.    Plan:  1. Referred to Dr Shen ([id number], [place], [place] [phone]).  2. Proceed with Medial Branch Blocks  3. Continue conservative management per DR Marley    HPI:  Melly is a 43yo F here for new patient consult for chronic progressive lower back pain. Reports intermittent low back pain 7 yrs ago while pregnant, which resolved after having a R L5-S1 injection but then got worse Dec 2021. No improvement with PT, NSAIDs, or injections. Has had TF ELISABETH gaby L4-5 6/29/22, Interlaminar ELISABETH L5-S1 8/12. Currently scheduled for MBB on 9/22. She denies leg pain, numbness, weakness, or change in bowel or bladder control.    Exam:    General: seated in NAD, appears comfortable    Neuro: PERRL, EOMs intact without nystagmus, face is symmetrical, tongue is midline, hearing intact bilaterally, shoulder shrug  is strong and equal    Strength:  Hip flexors: 5/5 right, 5/5 left  Quads: 5/5 right, 5/5 left  Hamstrings: 5/5 right, 5/5 left  Dorsiflexion: 5/5 right, 5/5 left  Plantarflexion 5/5 right, 5/5 left  EHL: 5/5 right, 5/5 left    Sensation is intact to light touch throughout lower extremities bilaterally    Reflexes:  2+ bilaterally throughout the lower extremities    No clonus, babinski, or brunson    Gait is smooth and coordinated. Heel, toe, and tandem gait intact.    No point tenderness or paraspinal musculature tenderness    Negative straight leg raises      Imaging:  Personally reviewed the following imaging studies today. Imaging also shown to patient at time of appt.  EXAM: MR LUMBAR SPINE W/O CONTRAST  LOCATION: [facility]  DATE/TIME: 5/18/2022 5:55 PM     INDICATION: Low back pain, > 6 wks.  COMPARISON: Lumbar spine 11/12/2014.  TECHNIQUE: Routine Lumbar Spine MRI without IV contrast.     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 The conus ends at T12-L1. 2 mm retrolisthesis of L4 on L5. Vertebral body heights are maintained. Nonpathologic marrow signal. Modic type I changes of L4-L5. No MRI evidence for pars defects. Mild symmetric   atrophy of the posterior paraspinal musculature. Normal diameter of the distal abdominal aorta.     T12-L1: Normal disc height and signal. No herniation. Mild bilateral facet arthropathy. No spinal canal or neural foraminal stenosis.      L1-L2: Normal disc height and signal. No herniation. Moderate bilateral facet arthropathy. No spinal canal or neural foraminal stenosis.     L2-L3: Normal disc height and signal. Shallow left paracentral/foraminal disc protrusion. Moderate bilateral facet arthropathy. No spinal canal or neural foraminal stenosis.      L3-L4: Normal intervertebral disc height and signal. Shallow broad-based disc bulge. Moderate bilateral facet arthropathy. No spinal canal narrowing. No neuroforaminal narrowing.     L4-L5:  Moderate intervertebral disc height loss. Loss of the normal T2 signal within the disc. Broad-based disc bulge with superimposed central disc extrusion. Moderate bilateral facet arthropathy. No spinal canal narrowing. Mild narrowing of the right   lateral recess. Mild right neuroforaminal narrowing. Mild left neuroforaminal narrowing.     L5-S1: Transitional disc. No spinal canal narrowing. No neuroforaminal narrowing.                                                                      IMPRESSION:  1.  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 Please correlate with radiographs prior to any surgical or interventional procedures.  2.  Compared to prior lumbar spine MRI 11/12/2014, interval worsening increase in the size of the central disc extrusion at L4-L5. Mild narrowing of the right lateral recess at L4-L5.  3.  Interval worsening of the intervertebral disc height loss at L4-L5 with new Modic type I changes.  4.  No high-grade spinal canal or neuroforaminal narrowing.      EXAM: XR LUMBAR BENDING ONLY 2/3 VIEWS  LOCATION: [facility]  DATE/TIME: 7/7/2022 10:02 AM     INDICATION: Back pain. Stability evaluation.  COMPARISON: MRI lumbar spine dated 05/18/2022  TECHNIQUE: Views of the spine were obtained and reviewed.     FINDINGS:  Transitional lumbosacral anatomy with partial lumbarization of the L5 vertebral body. Straightening of usual lumbar lordosis without significant spondylolisthesis. Spinal alignment is maintained during flexion/extension. No acute fracture. Multilevel   degenerative change most prominent at L4-L5 where there is advanced disc height loss and endplate sclerosis. Cholecystectomy clips.              Lisa Baker FNP-C  [facility] Neurosurgery  O. [phone]          Again, thank you for allowing me to participate in the care of your patient.        Sincerely,        KYLE Burton CNP

## 2022-09-15 NOTE — PATIENT INSTRUCTIONS
Disc degeneration with central disc bulge, facet arthropathy. No improvement with NSAIDs, injections, PT. No instability on flex ex films.  Dr Murray reviewed imaging and recommends opinion from Dr Shen about possible lumbar artificial disc replacement. Would not recommend fusion, laminectomy, or discectomy at this time.    Plan:  Referred to Dr Shen ([id number], [place], [place] [phone]).  Proceed with Medial Branch Blocks  Continue conservative management per DR Ayaka Baker FNP-C  [facility] Neurosurgery  O. [phone]

## 2022-09-16 ENCOUNTER — MEDICAL CORRESPONDENCE (OUTPATIENT)
Dept: NEUROSURGERY | Facility: CLINIC | Age: 42
End: 2022-09-16

## 2022-09-19 ENCOUNTER — OFFICE VISIT (OUTPATIENT)
Dept: NEUROSURGERY | Facility: CLINIC | Age: 42
End: 2022-09-19
Payer: COMMERCIAL

## 2022-09-19 VITALS — SYSTOLIC BLOOD PRESSURE: 147 MMHG | HEART RATE: 76 BPM | DIASTOLIC BLOOD PRESSURE: 102 MMHG | OXYGEN SATURATION: 98 %

## 2022-09-19 DIAGNOSIS — M51.9 DISC DISORDER OF LUMBAR REGION: ICD-10-CM

## 2022-09-19 PROCEDURE — 99214 OFFICE O/P EST MOD 30 MIN: CPT | Performed by: NEUROLOGICAL SURGERY

## 2022-09-19 ASSESSMENT — PAIN SCALES - GENERAL: PAINLEVEL: EXTREME PAIN (8)

## 2022-09-19 NOTE — PROGRESS NOTES
I was asked by Dr. Murray to see this patient in consultation    42 year old female with L4-5 disc degeneration, back pain.  Initially had a herniated disc in 2014 with back and right leg pain.  Leg pain has resolved, with some chronic numbness in the right dorsal foot.  Over the last year, has markedly worsening back pain, radiating side to side, with frequent debilitating muscle spasms.  No radiation to the legs or weakness.  Has done more than 9 months of chiropractic care and a few months of PT.  2 ESIs without durable relief.  Back pain markedly limiting daily activities.  She met with Dr. Murray and was not felt to be a candidate for fusion given the lack of subluxation on dynamic XRs.  MR Lumbar, personally reviewed, with L4-5 severe disc degeneration with right paracentral bulge, modic changes, mild facet arthropathy.       Past Medical History:   Diagnosis Date     Calculus of kidney     Created by Conversion      Calculus of kidney     Created by Conversion      Major depression in remission (H) 10/17/2016     Major depression in remission (H) 10/17/2016     Migraine     when on OCPs     Migraine     when on OCPs     Nephrolithiasis      Sleep apnea     No longer uses a CPAP,     Sleep apnea     No longer uses a CPAP,     Past Surgical History:   Procedure Laterality Date     APPENDECTOMY  11/01/2012     CHOLECYSTECTOMY  2009     CHOLECYSTECTOMY, LAPOROSCOPIC  6-1-09     DILATION AND CURETTAGE  2008    following sab     HYSTEROSCOPY, ABLATE ENDOMETRIUM HYDROTHERMAL, COMBINED N/A 1/6/2020    Procedure: HYSTEROSCOPY, WITH ENDOMETRIAL RADIOFREQUENCY ABLATION - NOVASURE;  Surgeon: Georgia Deleon MD;  Location: [facility];  Service: Gynecology     IR LUMBAR EPIDURAL STEROID INJECTION  11/13/2014     SURGICAL HISTORY OF -   1/7/09    vaginal birth     SURGICAL HISTORY OF -   3/1/08    Kidney stone removed     [facility] REMOVAL OF KIDNEY STONE      Description: Lithotomy;  Proc Date: 01/01/2008;     Social  History     Socioeconomic History     Marital status:      Spouse name: Not on file     Number of children: Not on file     Years of education: Not on file     Highest education level: Not on file   Occupational History     Not on file   Tobacco Use     Smoking status: Never Smoker     Smokeless tobacco: Never Used   Vaping Use     Vaping Use: Never used   Substance and Sexual Activity     Alcohol use: No     Drug use: No     Sexual activity: Yes     Partners: Male     Birth control/protection: Male Surgical, Surgical     Comment:  has vasectomy   Other Topics Concern     Parent/sibling w/ CABG, MI or angioplasty before 65F 55M? Not Asked   Social History Narrative     Not on file     Social Determinants of Health     Financial Resource Strain: Not on file   Food Insecurity: Not on file   Transportation Needs: Not on file   Physical Activity: Not on file   Stress: Not on file   Social Connections: Not on file   Intimate Partner Violence: Not on file   Housing Stability: Not on file     Family History   Problem Relation Age of Onset     Breast Cancer Maternal Grandmother      Cancer Paternal Grandfather         brain     Breast Cancer Mother 65.00     No Known Problems Father      Cancer Paternal Grandmother         lung        ROS: 10 point ROS neg other than the symptoms noted above in the HPI.    Physical Exam  BP (!) 147/102   Pulse 76   SpO2 98%   HEENT:  Normocephalic, atraumatic.  PERRLA.  EOM s intact.  Visual fields full to gross exam  Neck:  Supple, non-tender, without lymphadenopathy.  Heart:  No peripheral edema  Lungs:  No SOB  Abdomen:  Non-distended.   Skin:  Warm and dry.  Extremities:  No edema, cyanosis or clubbing.  Psychiatric:  No apparent distress  Musculoskeletal:  Normal bulk and tone    NEUROLOGICAL EXAMINATION:     Mental status:  Alert and Oriented x 3, speech is fluent.  Cranial nerves:  II-XII intact.   Motor:    Shoulder Abduction:  Right:  5/5   Left:  5/5  Biceps:                       Right:  5/5   Left:  5/5  Triceps:                     Right:  5/5   Left:  5/5  Wrist Extensors:       Right:  5/5   Left:  5/5  Wrist Flexors:           Right:  5/5   Left:  5/5  interosseus :            Right:  5/5   Left:  5/5  Hip Flexion:                Right: 5/5  Left:  5/5  Quadriceps:             Right:  5/5  Left:  5/5  Hamstrings:             Right:  5/5  Left:  5/5  Gastroc Soleus:        Right:  5/5  Left:  5/5  Tib/Ant:                      Right:  5/5  Left:  5/5  EHL:                     Right:  5/5  Left:  5/5  Sensation:  Right dorsal foot numbness  Reflexes:  Negative Babinski.  Negative Clonus.  Negative Neumann's.  Coordination:  Smooth finger to nose testing.   Negative pronator drift.  Smooth tandem walking.    A/P:  42 year old female with L4-5 disc degeneration, back pain    I had a discussion with the patient, reviewing the history, symptoms, and imaging  We had a discussion about continued non-surgical options, including PT, MBB/RFA, and weight loss  We also discussed the overall inconsistent outcomes of low back surgery for isolated low back pain  We discussed surgical options, including lumbar arthroplasty, and risks/benefits in the setting of her elevated BMI  She will discuss options with her  and contact us when she decides how she wants to proceed

## 2022-09-19 NOTE — LETTER
9/19/2022         RE: Shannon Quiroz  [address] [facility] [address]        Dear Colleague,    Thank you for referring your patient, Shannon Quiroz, to the [facility] NEUROLOGY CLINICS [facility]. Please see a copy of my visit note below.    I was asked by Dr. Murray to see this patient in consultation    42 year old female with L4-5 disc degeneration, back pain.  Initially had a herniated disc in 2014 with back and right leg pain.  Leg pain has resolved, with some chronic numbness in the right dorsal foot.  Over the last year, has markedly worsening back pain, radiating side to side, with frequent debilitating muscle spasms.  No radiation to the legs or weakness.  Has done more than 9 months of chiropractic care and a few months of PT.  2 ESIs without durable relief.  Back pain markedly limiting daily activities.  She met with Dr. Murray and was not felt to be a candidate for fusion given the lack of subluxation on dynamic XRs.  MR Lumbar, personally reviewed, with L4-5 severe disc degeneration with right paracentral bulge, modic changes, mild facet arthropathy.       Past Medical History:   Diagnosis Date     Calculus of kidney     Created by Conversion      Calculus of kidney     Created by Conversion      Major depression in remission (H) 10/17/2016     Major depression in remission (H) 10/17/2016     Migraine     when on OCPs     Migraine     when on OCPs     Nephrolithiasis      Sleep apnea     No longer uses a CPAP,     Sleep apnea     No longer uses a CPAP,     Past Surgical History:   Procedure Laterality Date     APPENDECTOMY  11/01/2012     CHOLECYSTECTOMY  2009     CHOLECYSTECTOMY, LAPOROSCOPIC  6-1-09     DILATION AND CURETTAGE  2008    following sab     HYSTEROSCOPY, ABLATE ENDOMETRIUM HYDROTHERMAL, COMBINED N/A 1/6/2020    Procedure: HYSTEROSCOPY, WITH ENDOMETRIAL RADIOFREQUENCY ABLATION - NOVASURE;  Surgeon: Georgia Deleon MD;  Location: [facility];  Service: Gynecology      IR LUMBAR EPIDURAL STEROID INJECTION  11/13/2014     SURGICAL HISTORY OF -   1/7/09    vaginal birth     SURGICAL HISTORY OF -   3/1/08    Kidney stone removed     [facility] REMOVAL OF KIDNEY STONE      Description: Lithotomy;  Proc Date: 01/01/2008;     Social History     Socioeconomic History     Marital status:      Spouse name: Not on file     Number of children: Not on file     Years of education: Not on file     Highest education level: Not on file   Occupational History     Not on file   Tobacco Use     Smoking status: Never Smoker     Smokeless tobacco: Never Used   Vaping Use     Vaping Use: Never used   Substance and Sexual Activity     Alcohol use: No     Drug use: No     Sexual activity: Yes     Partners: Male     Birth control/protection: Male Surgical, Surgical     Comment:  has vasectomy   Other Topics Concern     Parent/sibling w/ CABG, MI or angioplasty before 65F 55M? Not Asked   Social History Narrative     Not on file     Social Determinants of Health     Financial Resource Strain: Not on file   Food Insecurity: Not on file   Transportation Needs: Not on file   Physical Activity: Not on file   Stress: Not on file   Social Connections: Not on file   Intimate Partner Violence: Not on file   Housing Stability: Not on file     Family History   Problem Relation Age of Onset     Breast Cancer Maternal Grandmother      Cancer Paternal Grandfather         brain     Breast Cancer Mother 65.00     No Known Problems Father      Cancer Paternal Grandmother         lung        ROS: 10 point ROS neg other than the symptoms noted above in the HPI.    Physical Exam  BP (!) 147/102   Pulse 76   SpO2 98%   HEENT:  Normocephalic, atraumatic.  PERRLA.  EOM s intact.  Visual fields full to gross exam  Neck:  Supple, non-tender, without lymphadenopathy.  Heart:  No peripheral edema  Lungs:  No SOB  Abdomen:  Non-distended.   Skin:  Warm and dry.  Extremities:  No edema, cyanosis or clubbing.  Psychiatric:   No apparent distress  Musculoskeletal:  Normal bulk and tone    NEUROLOGICAL EXAMINATION:     Mental status:  Alert and Oriented x 3, speech is fluent.  Cranial nerves:  II-XII intact.   Motor:    Shoulder Abduction:  Right:  5/5   Left:  5/5  Biceps:                      Right:  5/5   Left:  5/5  Triceps:                     Right:  5/5   Left:  5/5  Wrist Extensors:       Right:  5/5   Left:  5/5  Wrist Flexors:           Right:  5/5   Left:  5/5  interosseus :            Right:  5/5   Left:  5/5  Hip Flexion:                Right: 5/5  Left:  5/5  Quadriceps:             Right:  5/5  Left:  5/5  Hamstrings:             Right:  5/5  Left:  5/5  Gastroc Soleus:        Right:  5/5  Left:  5/5  Tib/Ant:                      Right:  5/5  Left:  5/5  EHL:                     Right:  5/5  Left:  5/5  Sensation:  Right dorsal foot numbness  Reflexes:  Negative Babinski.  Negative Clonus.  Negative Neumann's.  Coordination:  Smooth finger to nose testing.   Negative pronator drift.  Smooth tandem walking.    A/P:  42 year old female with L4-5 disc degeneration, back pain    I had a discussion with the patient, reviewing the history, symptoms, and imaging  We had a discussion about continued non-surgical options, including PT, MBB/RFA, and weight loss  We also discussed the overall inconsistent outcomes of low back surgery for isolated low back pain  We discussed surgical options, including lumbar arthroplasty, and risks/benefits in the setting of her elevated BMI  She will discuss options with her  and contact us when she decides how she wants to proceed         Again, thank you for allowing me to participate in the care of your patient.        Sincerely,        Isaías Shen MD

## 2022-09-19 NOTE — NURSING NOTE
"Shannon Quiroz is a 42 year old female who presents for:  Chief Complaint   Patient presents with     Consult     Possible lumbar artificial replacement        Initial Vitals:  BP (!) 147/102   Pulse 76   SpO2 98%  Estimated body mass index is 41.8 kg/m  as calculated from the following:    Height as of 9/15/22: 5' 6\" (1.676 m).    Weight as of 9/15/22: 259 lb (117.5 kg).. There is no height or weight on file to calculate BSA. BP completed using cuff size: large  Extreme Pain (8)    Nursing Comments:     Milind Giron MA    "

## 2022-09-27 ENCOUNTER — RADIOLOGY INJECTION OFFICE VISIT (OUTPATIENT)
Dept: PHYSICAL MEDICINE AND REHAB | Facility: CLINIC | Age: 42
End: 2022-09-27
Attending: PHYSICAL MEDICINE & REHABILITATION
Payer: COMMERCIAL

## 2022-09-27 VITALS — OXYGEN SATURATION: 98 % | HEART RATE: 86 BPM | DIASTOLIC BLOOD PRESSURE: 88 MMHG | SYSTOLIC BLOOD PRESSURE: 138 MMHG

## 2022-09-27 DIAGNOSIS — M54.50 LUMBAR SPINE PAIN: ICD-10-CM

## 2022-09-27 DIAGNOSIS — M47.816 LUMBAR FACET ARTHROPATHY: ICD-10-CM

## 2022-09-27 PROCEDURE — 64494 INJ PARAVERT F JNT L/S 2 LEV: CPT | Mod: KX | Performed by: PAIN MEDICINE

## 2022-09-27 PROCEDURE — 64493 INJ PARAVERT F JNT L/S 1 LEV: CPT | Mod: KX | Performed by: PAIN MEDICINE

## 2022-09-27 PROCEDURE — A9576 INJ PROHANCE MULTIPACK: HCPCS | Performed by: PAIN MEDICINE

## 2022-09-27 RX ORDER — LIDOCAINE HYDROCHLORIDE 10 MG/ML
INJECTION, SOLUTION EPIDURAL; INFILTRATION; INTRACAUDAL; PERINEURAL
Status: COMPLETED | OUTPATIENT
Start: 2022-09-27 | End: 2022-09-27

## 2022-09-27 RX ORDER — BUPIVACAINE HYDROCHLORIDE 7.5 MG/ML
INJECTION, SOLUTION EPIDURAL; RETROBULBAR
Status: COMPLETED | OUTPATIENT
Start: 2022-09-27 | End: 2022-09-27

## 2022-09-27 RX ADMIN — BUPIVACAINE HYDROCHLORIDE 3 ML: 7.5 INJECTION, SOLUTION EPIDURAL; RETROBULBAR at 11:14

## 2022-09-27 RX ADMIN — LIDOCAINE HYDROCHLORIDE 4 ML: 10 INJECTION, SOLUTION EPIDURAL; INFILTRATION; INTRACAUDAL; PERINEURAL at 11:14

## 2022-09-27 ASSESSMENT — PAIN SCALES - GENERAL
PAINLEVEL: NO PAIN (1)
PAINLEVEL: SEVERE PAIN (7)

## 2022-09-27 NOTE — PATIENT INSTRUCTIONS
Please complete your pain diary and return the diary to the Spine Center at your earliest convenience.  The Sp[facility] Center will contact you to schedule your next appointment after your pain diary is reviewed by your doctor.  Thank you.    DISCHARGE INSTRUCTIONS    During office hours (8:00 a.m.- 4:00 p.m.) questions or concerns may be answered  by calling Spine Center Navigation Nurses at  [phone].  Messages received after hours will be returned the following business day.      In the case of an emergency, please dial 911 or seek assistance at the nearest Emergency Room/Urgent Care facility.     All Patients:  You may experience an increase in your symptoms for the first 2 days, once the numbing medication wears off.    You may resume your regular medication, no pain medication until you have completed your diary    You may shower. No swimming, tub bath or hot tub for 24 hours; remove bandage after 4 hours    Continue your activities that can cause you pain to test the blocks.                         You should not drive for the next 3 to 5 hours (have someone drive you)           POSSIBLE PROCEDURE SIDE EFFECTS  -Call Spine Center if concerned-    Increased Pain  Increased numbness/tingling     Nausea/Vomiting  Bruising/bleeding at site (hematoma)             Swelling at site (edema) Headache  Difficulty walking  Infection        Fever greater than 100.5

## 2022-09-29 ENCOUNTER — TELEPHONE (OUTPATIENT)
Dept: PHYSICAL MEDICINE AND REHAB | Facility: CLINIC | Age: 42
End: 2022-09-29

## 2022-09-29 NOTE — TELEPHONE ENCOUNTER
Call placed to pt after pt had diagnostic bilateral L3, L4, L5 MBBs with Dr. Manzo. Based on pain diary response, pt had positive initial response to diagnostic injections. Recommendation is to move forward with confirmatory blocks.     Left detailed message with nurse navigation line # as call back to discuss.

## 2022-10-05 DIAGNOSIS — F33.0 MAJOR DEPRESSIVE DISORDER, RECURRENT EPISODE, MILD (H): ICD-10-CM

## 2022-10-06 RX ORDER — VENLAFAXINE HYDROCHLORIDE 37.5 MG/1
CAPSULE, EXTENDED RELEASE ORAL
Qty: 90 CAPSULE | Refills: 0 | Status: SHIPPED | OUTPATIENT
Start: 2022-10-06 | End: 2023-01-06

## 2022-10-06 NOTE — TELEPHONE ENCOUNTER
Prescription approved per [facility] Refill Protocol.  Radha De Los Santos RN

## 2022-10-13 DIAGNOSIS — M43.16 SPONDYLOLISTHESIS OF LUMBAR REGION: Primary | ICD-10-CM

## 2022-10-19 NOTE — TELEPHONE ENCOUNTER
"No response from patient and she is not scheduled for 2nd set of MBBs. Call placed to discuss. States she did speak to someone about this already (Rere in scheduling on 10/13 per note in order). States she did not feel the MBBs gave her any relief; reports the pain was the same when she was trying to do things after the injections as it was prior to the MBBs. \"I want to step back and take a break. I am feeling overwhelmed right now. I will reach out when I want to take this back up again.\"     Direct number given to nurse navigation line. Encouraged pt to call nurse navigation line if questions or concerns arise. Stated understanding and appreciation for call.   "

## 2022-10-19 NOTE — TELEPHONE ENCOUNTER
It is perfectly fine for her to hold off right now with further treatment.  If she would like to speak with me about medial branch blocks prior to proceeding, she can make an appointment and we can discuss her response to the initial injections.

## 2023-01-05 DIAGNOSIS — F33.0 MAJOR DEPRESSIVE DISORDER, RECURRENT EPISODE, MILD (H): ICD-10-CM

## 2023-01-06 RX ORDER — VENLAFAXINE HYDROCHLORIDE 37.5 MG/1
CAPSULE, EXTENDED RELEASE ORAL
Qty: 90 CAPSULE | Refills: 0 | Status: SHIPPED | OUTPATIENT
Start: 2023-01-06 | End: 2023-04-26

## 2023-01-06 NOTE — TELEPHONE ENCOUNTER
Routing refill request to provider for review/approval because:  PCP to determine  Deangelo Roberts RN

## 2023-04-23 ENCOUNTER — HEALTH MAINTENANCE LETTER (OUTPATIENT)
Age: 43
End: 2023-04-23

## 2023-05-25 DIAGNOSIS — G43.829 MENSTRUAL MIGRAINE WITHOUT STATUS MIGRAINOSUS, NOT INTRACTABLE: ICD-10-CM

## 2023-05-25 RX ORDER — SUMATRIPTAN 100 MG/1
TABLET, FILM COATED ORAL
Qty: 16 TABLET | Refills: 0 | Status: SHIPPED | OUTPATIENT
Start: 2023-05-25 | End: 2023-06-06

## 2023-05-25 NOTE — TELEPHONE ENCOUNTER
Routing refill request to provider for review/approval because:  PCP to determine refill    Ancelmo Moe RN

## 2023-06-06 ENCOUNTER — OFFICE VISIT (OUTPATIENT)
Dept: FAMILY MEDICINE | Facility: CLINIC | Age: 43
End: 2023-06-06
Payer: COMMERCIAL

## 2023-06-06 VITALS
HEIGHT: 66 IN | RESPIRATION RATE: 16 BRPM | OXYGEN SATURATION: 99 % | BODY MASS INDEX: 42.2 KG/M2 | DIASTOLIC BLOOD PRESSURE: 86 MMHG | SYSTOLIC BLOOD PRESSURE: 132 MMHG | HEART RATE: 70 BPM | WEIGHT: 262.6 LBS | TEMPERATURE: 98 F

## 2023-06-06 DIAGNOSIS — F33.0 MAJOR DEPRESSIVE DISORDER, RECURRENT EPISODE, MILD (H): ICD-10-CM

## 2023-06-06 DIAGNOSIS — E66.01 MORBID OBESITY (H): ICD-10-CM

## 2023-06-06 DIAGNOSIS — G43.829 MENSTRUAL MIGRAINE WITHOUT STATUS MIGRAINOSUS, NOT INTRACTABLE: ICD-10-CM

## 2023-06-06 DIAGNOSIS — Z12.31 ENCOUNTER FOR SCREENING MAMMOGRAM FOR BREAST CANCER: ICD-10-CM

## 2023-06-06 DIAGNOSIS — E03.8 SUBCLINICAL HYPOTHYROIDISM: ICD-10-CM

## 2023-06-06 DIAGNOSIS — Z00.00 ROUTINE GENERAL MEDICAL EXAMINATION AT A HEALTH CARE FACILITY: Primary | ICD-10-CM

## 2023-06-06 LAB
ANION GAP SERPL CALCULATED.3IONS-SCNC: 12 MMOL/L (ref 7–15)
BUN SERPL-MCNC: 13.8 MG/DL (ref 6–20)
CALCIUM SERPL-MCNC: 8.9 MG/DL (ref 8.6–10)
CHLORIDE SERPL-SCNC: 106 MMOL/L (ref 98–107)
CREAT SERPL-MCNC: 0.73 MG/DL (ref 0.51–0.95)
DEPRECATED HCO3 PLAS-SCNC: 24 MMOL/L (ref 22–29)
GFR SERPL CREATININE-BSD FRML MDRD: >90 ML/MIN/1.73M2
GLUCOSE SERPL-MCNC: 102 MG/DL (ref 70–99)
POTASSIUM SERPL-SCNC: 4.1 MMOL/L (ref 3.4–5.3)
SODIUM SERPL-SCNC: 142 MMOL/L (ref 136–145)
TSH SERPL DL<=0.005 MIU/L-ACNC: 3.45 UIU/ML (ref 0.3–4.2)

## 2023-06-06 PROCEDURE — 84443 ASSAY THYROID STIM HORMONE: CPT | Performed by: FAMILY MEDICINE

## 2023-06-06 PROCEDURE — 80048 BASIC METABOLIC PNL TOTAL CA: CPT | Performed by: FAMILY MEDICINE

## 2023-06-06 PROCEDURE — 99396 PREV VISIT EST AGE 40-64: CPT | Mod: 25 | Performed by: FAMILY MEDICINE

## 2023-06-06 PROCEDURE — 90746 HEPB VACCINE 3 DOSE ADULT IM: CPT | Performed by: FAMILY MEDICINE

## 2023-06-06 PROCEDURE — 90471 IMMUNIZATION ADMIN: CPT | Performed by: FAMILY MEDICINE

## 2023-06-06 PROCEDURE — 99213 OFFICE O/P EST LOW 20 MIN: CPT | Mod: 25 | Performed by: FAMILY MEDICINE

## 2023-06-06 PROCEDURE — 36415 COLL VENOUS BLD VENIPUNCTURE: CPT | Performed by: FAMILY MEDICINE

## 2023-06-06 RX ORDER — SUMATRIPTAN 100 MG/1
TABLET, FILM COATED ORAL
Qty: 16 TABLET | Refills: 3 | Status: SHIPPED | OUTPATIENT
Start: 2023-06-06 | End: 2023-09-22

## 2023-06-06 RX ORDER — VENLAFAXINE HYDROCHLORIDE 37.5 MG/1
37.5 CAPSULE, EXTENDED RELEASE ORAL DAILY
Qty: 90 CAPSULE | Refills: 3 | Status: SHIPPED | OUTPATIENT
Start: 2023-06-06 | End: 2024-07-29

## 2023-06-06 ASSESSMENT — PATIENT HEALTH QUESTIONNAIRE - PHQ9
SUM OF ALL RESPONSES TO PHQ QUESTIONS 1-9: 1
SUM OF ALL RESPONSES TO PHQ QUESTIONS 1-9: 1
10. IF YOU CHECKED OFF ANY PROBLEMS, HOW DIFFICULT HAVE THESE PROBLEMS MADE IT FOR YOU TO DO YOUR WORK, TAKE CARE OF THINGS AT HOME, OR GET ALONG WITH OTHER PEOPLE: NOT DIFFICULT AT ALL

## 2023-06-06 ASSESSMENT — ENCOUNTER SYMPTOMS
NERVOUS/ANXIOUS: 0
DIZZINESS: 0
CHILLS: 0
HEARTBURN: 0
HEADACHES: 1
PALPITATIONS: 0
PARESTHESIAS: 0
DIARRHEA: 0
EYE PAIN: 0
NAUSEA: 0
ARTHRALGIAS: 0
SORE THROAT: 0
CONSTIPATION: 0
BREAST MASS: 0
MYALGIAS: 0
FREQUENCY: 0
DYSURIA: 0
HEMATOCHEZIA: 0
FEVER: 0
ABDOMINAL PAIN: 0
HEMATURIA: 0
WEAKNESS: 0
SHORTNESS OF BREATH: 0
JOINT SWELLING: 0

## 2023-06-06 NOTE — PROGRESS NOTES
"Answers for HPI/ROS submitted by the patient on 6/6/2023  If you checked off any problems, how difficult have these problems made it for you to do your work, take care of things at home, or get along with other people?: Not difficult at all  PHQ9 TOTAL SCORE: 1         SUBJECTIVE:   CC: Melly is an 43 year old who presents for preventive health visit    Healthy Habits:     Getting at least 3 servings of Calcium per day:  Yes    Bi-annual eye exam:  Yes    Dental care twice a year:  Yes    Sleep apnea or symptoms of sleep apnea:  Sleep apnea    Diet:  Regular (no restrictions)    Frequency of exercise:  2-3 days/week    Duration of exercise:  45-60 minutes    Taking medications regularly:  Yes    Medication side effects:  None    PHQ-2 Total Score: 0    Additional concerns today:  Yes    Concerns:  * weight     Wanted to \"try some stuff on her own\" over the last year.  Tried some intermittent fasting (8/16), tried to eat reasonably when eating. Tried it for 3 weeks and did not see success within the 3 weeks so stopped.    Ended up going back to smaller portions and calorie cutting.  Was not able to add any exercise.  Feels like she lost about 10# over the course of a few months but then reverted back to her normal pattern and had regain.    Has done a lot of research abut Ozempic/Wegovy and was told this was covered for weight loss.          Have you ever done Advance Care Planning? (For example, a Health Directive, POLST, or a discussion with a medical provider or your loved ones about your wishes):     Social History     Tobacco Use     Smoking status: Never     Smokeless tobacco: Never   Vaping Use     Vaping status: Never Used   Substance Use Topics     Alcohol use: No             6/6/2023     9:40 AM   Alcohol Use   Prescreen: >3 drinks/day or >7 drinks/week? No     Reviewed orders with patient.  Reviewed health maintenance and updated orders accordingly - Yes      Breast Cancer Screening:    FHS-7:       " 5/3/2022    11:13 AM 7/7/2022     9:48 AM   Breast CA Risk Assessment (FHS-7)   Did any of your first-degree relatives have breast or ovarian cancer? Yes Yes   Did any of your relatives have bilateral breast cancer? No No   Did any man in your family have breast cancer? No No   Did any woman in your family have breast and ovarian cancer? Yes No   Did any woman in your family have breast cancer before age 50 y? No No   Do you have 2 or more relatives with breast and/or ovarian cancer? Yes Yes   Do you have 2 or more relatives with breast and/or bowel cancer? Yes Yes       Mammogram Screening - Offered annual screening and updated Health Maintenance for mutual plan based on risk factor consideration    Pertinent mammograms are reviewed under the imaging tab.    History of abnormal Pap smear: YES - updated in Problem List and Health Maintenance accordingly      Latest Ref Rng & Units 10/4/2019    11:00 AM 10/4/2019    10:44 AM 11/9/2017    11:25 AM   PAP / HPV   PAP    Negative for squamous intraepithelial lesion or malignancy  Electronically signed by Janie Mccord CT (ASCP) on 11/15/2017 at  2:34 PM       PAP (Historical)   NIL      HPV 16 DNA NEG^Negative Negative       HPV 18 DNA NEG^Negative Negative       Other HR HPV NEG^Negative Negative         Reviewed and updated as needed this visit by clinical staff   Tobacco  Allergies  Meds   Med Hx   Fam Hx          Reviewed and updated as needed this visit by Provider   Tobacco  Allergies  Meds             Past Medical History:   Diagnosis Date     Calculus of kidney     Created by Conversion      Calculus of kidney     Created by Conversion      Major depression in remission (H) 10/17/2016     Major depression in remission (H) 10/17/2016     Migraine     when on OCPs     Migraine     when on OCPs     Nephrolithiasis      PCOS (polycystic ovarian syndrome)      Sleep apnea     No longer uses a CPAP,     Sleep apnea     No longer uses a CPAP,      Past  "Surgical History:   Procedure Laterality Date     APPENDECTOMY  11/01/2012     CHOLECYSTECTOMY  2009     CHOLECYSTECTOMY, LAPOROSCOPIC  6-1-09     DILATION AND CURETTAGE  2008    following sab     HYSTEROSCOPY, ABLATE ENDOMETRIUM HYDROTHERMAL, COMBINED N/A 1/6/2020    Procedure: HYSTEROSCOPY, WITH ENDOMETRIAL RADIOFREQUENCY ABLATION - NOVASURE;  Surgeon: Georgia Deleon MD;  Location: [facility];  Service: Gynecology     IR LUMBAR EPIDURAL STEROID INJECTION  11/13/2014     SURGICAL HISTORY OF -   1/7/09    vaginal birth     SURGICAL HISTORY OF -   3/1/08    Kidney stone removed     Z REMOVAL OF KIDNEY STONE      Description: Lithotomy;  Proc Date: 01/01/2008;       Review of Systems   Constitutional: Negative for chills and fever.   HENT: Negative for congestion, ear pain, hearing loss and sore throat.    Eyes: Negative for pain and visual disturbance.   Respiratory: Negative for shortness of breath.    Cardiovascular: Negative for chest pain, palpitations and peripheral edema.   Gastrointestinal: Negative for abdominal pain, constipation, diarrhea, heartburn, hematochezia and nausea.   Breasts:  Negative for tenderness, breast mass and discharge.   Genitourinary: Negative for dysuria, frequency, genital sores, hematuria, pelvic pain, urgency, vaginal bleeding and vaginal discharge.   Musculoskeletal: Negative for arthralgias, joint swelling and myalgias.   Skin: Negative for rash.   Neurological: Positive for headaches. Negative for dizziness, weakness and paresthesias.   Psychiatric/Behavioral: Negative for mood changes. The patient is not nervous/anxious.      NEELY has been stable, unchanged.  imitrex continues to work well when needed     OBJECTIVE:   /86   Pulse 70   Temp 98  F (36.7  C)   Resp 16   Ht 1.676 m (5' 6\")   Wt 119.1 kg (262 lb 9.6 oz)   SpO2 99%   BMI 42.38 kg/m    Physical Exam  GENERAL: healthy, alert and no distress  EYES: Eyes grossly normal to inspection, PERRL and " conjunctivae and sclerae normal  NECK: no adenopathy, no asymmetry, masses, or scars and thyroid normal to palpation  RESP: lungs clear to auscultation - no rales, rhonchi or wheezes  CV: regular rate and rhythm, normal S1 S2, no S3 or S4, no murmur, click or rub, no peripheral edema and peripheral pulses strong  ABDOMEN: soft, nontender, no hepatosplenomegaly, no masses and bowel sounds normal  MS: no gross musculoskeletal defects noted, no edema  NEURO: Normal strength and tone, mentation intact and speech normal  PSYCH: mentation appears normal, affect normal/bright    Diagnostic Test Results:  Labs reviewed in Epic    ASSESSMENT/PLAN:       ICD-10-CM    1. Routine general medical examination at a health care facility  Z00.00       2. Morbid obesity (H)  E66.01 liraglutide - Weight Management (SAXENDA) 18 MG/3ML pen     Basic metabolic panel  (Ca, Cl, CO2, Creat, Gluc, K, Na, BUN)     Basic metabolic panel  (Ca, Cl, CO2, Creat, Gluc, K, Na, BUN)      3. Major depressive disorder, recurrent episode, mild (H)  F33.0 venlafaxine (EFFEXOR XR) 37.5 MG 24 hr capsule      4. Menstrual migraine without status migrainosus, not intractable  G43.829 SUMAtriptan (IMITREX) 100 MG tablet      5. Subclinical hypothyroidism  E03.8 TSH with free T4 reflex     TSH with free T4 reflex      6. Encounter for screening mammogram for breast cancer  Z12.31 *MA Screening Digital Bilateral        Depression:  Controlled, continue med    Migraine:  Stable, prn imitrex    Morbid obesity:  Reviewed options including limited availability of Ozempic/Wegovy.  Discussed consideration of Saxenda as an alternative.  Reviewed side effects.  Pt without contraindication.  Will send script.  She will reach out if not covered/tolerated.  Discussed lifestyle intervention as well which she will continue.  F/u in clinic 4 months, sooner if needed    Patient has been advised of split billing requirements and indicates understanding:  "Yes      COUNSELING:  Reviewed preventive health counseling, as reflected in patient instructions      BMI:   Estimated body mass index is 42.38 kg/m  as calculated from the following:    Height as of this encounter: 1.676 m (5' 6\").    Weight as of this encounter: 119.1 kg (262 lb 9.6 oz).   Weight management plan: see above      She reports that she has never smoked. She has never used smokeless tobacco.          Jennifer Maldonado [facility]  "

## 2023-06-06 NOTE — NURSING NOTE
"Initial /86   Pulse 70   Temp 98  F (36.7  C)   Resp 16   Ht 1.676 m (5' 6\")   Wt 119.1 kg (262 lb 9.6 oz)   SpO2 99%   BMI 42.38 kg/m   Estimated body mass index is 42.38 kg/m  as calculated from the following:    Height as of this encounter: 1.676 m (5' 6\").    Weight as of this encounter: 119.1 kg (262 lb 9.6 oz). .      "

## 2023-06-07 ENCOUNTER — MYC MEDICAL ADVICE (OUTPATIENT)
Dept: FAMILY MEDICINE | Facility: CLINIC | Age: 43
End: 2023-06-07
Payer: COMMERCIAL

## 2023-06-07 ENCOUNTER — TELEPHONE (OUTPATIENT)
Dept: FAMILY MEDICINE | Facility: CLINIC | Age: 43
End: 2023-06-07
Payer: COMMERCIAL

## 2023-06-07 DIAGNOSIS — E66.01 MORBID OBESITY (H): ICD-10-CM

## 2023-06-07 RX ORDER — LIRAGLUTIDE 6 MG/ML
INJECTION, SOLUTION SUBCUTANEOUS
Refills: 0 | OUTPATIENT
Start: 2023-06-07

## 2023-06-07 NOTE — TELEPHONE ENCOUNTER
Patient's insurance requires a PA for Saxenda   Forwarding to PA team to review and initiate PA    Ancelmo Moe RN

## 2023-06-07 NOTE — TELEPHONE ENCOUNTER
Refill request refused  Duplicate request    PA encounter initiated in separate encounter    Ancelmo Moe RN

## 2023-06-12 NOTE — TELEPHONE ENCOUNTER
PA Initiation    Medication: SAXENDA 18 MG/3ML SC SOPN  Insurance Company: [employer] - Phone [phone] Fax [phone]  [id number] [place] [place] [facility] [address] [place] [address]  Filling Pharmacy Phone: [phone]  Filling Pharmacy Fax: [phone]  Start Date: 6/11/2023

## 2023-06-12 NOTE — TELEPHONE ENCOUNTER
PRIOR AUTHORIZATION DENIED    Medication: SAXENDA 18 MG/3ML SC SOPN    Insurance Company: [employer] - Phone [phone] Fax [phone]    Denial Date: 6/12/2023    Denial Rational: Excluded    Appeal Information:

## 2023-07-10 ENCOUNTER — ANCILLARY PROCEDURE (OUTPATIENT)
Dept: MAMMOGRAPHY | Facility: HOSPITAL | Age: 43
End: 2023-07-10
Attending: FAMILY MEDICINE
Payer: COMMERCIAL

## 2023-07-10 DIAGNOSIS — Z12.31 ENCOUNTER FOR SCREENING MAMMOGRAM FOR BREAST CANCER: ICD-10-CM

## 2023-07-10 PROCEDURE — 77067 SCR MAMMO BI INCL CAD: CPT

## 2023-07-12 ENCOUNTER — MYC REFILL (OUTPATIENT)
Dept: FAMILY MEDICINE | Facility: CLINIC | Age: 43
End: 2023-07-12
Payer: COMMERCIAL

## 2023-07-12 DIAGNOSIS — E66.01 MORBID OBESITY (H): ICD-10-CM

## 2023-07-12 NOTE — TELEPHONE ENCOUNTER
"Routing refill request to provider for review/approval because:  Provider to determine refill       Requested Prescriptions   Pending Prescriptions Disp Refills    semaglutide (OZEMPIC) 2 MG/3ML pen 1.5 mL 0     Sig: Inject 0.25 mg Subcutaneous every 7 days       GLP-1 Agonists Protocol Failed - 7/12/2023 12:16 PM        Failed - HgbA1C in past 3 or 6 months     If HgbA1C is 8 or greater, it needs to be on file within the past 3 months.  If less than 8, must be on file within the past 6 months.     Recent Labs   Lab Test 07/27/22  0902   A1C 5.2             Passed - Medication is active on med list        Passed - Patient is age 18 or older        Passed - No active pregnancy on record        Passed - Normal serum creatinine on file in past 12 months     Recent Labs   Lab Test 06/06/23  1105   CR 0.73       Ok to refill medication if creatinine is low          Passed - No positive pregnancy test in past 12 months        Passed - Recent (6 mo) or future (30 days) visit within the authorizing provider's specialty     Patient had office visit in the last 6 months or has a visit in the next 30 days with authorizing provider.  See \"Patient Info\" tab in inbasket, or \"Choose Columns\" in Meds & Orders section of the refill encounter.                     Ancelmo Moe RN 07/12/23 12:20 PM   "

## 2023-07-26 ENCOUNTER — MYC MEDICAL ADVICE (OUTPATIENT)
Dept: FAMILY MEDICINE | Facility: CLINIC | Age: 43
End: 2023-07-26
Payer: COMMERCIAL

## 2023-07-26 DIAGNOSIS — E66.01 MORBID OBESITY (H): Primary | ICD-10-CM

## 2023-08-02 ENCOUNTER — MYC MEDICAL ADVICE (OUTPATIENT)
Dept: FAMILY MEDICINE | Facility: CLINIC | Age: 43
End: 2023-08-02
Payer: COMMERCIAL

## 2023-08-26 ENCOUNTER — MYC REFILL (OUTPATIENT)
Dept: FAMILY MEDICINE | Facility: CLINIC | Age: 43
End: 2023-08-26
Payer: COMMERCIAL

## 2023-08-26 DIAGNOSIS — E66.01 MORBID OBESITY (H): ICD-10-CM

## 2023-09-21 DIAGNOSIS — G43.829 MENSTRUAL MIGRAINE WITHOUT STATUS MIGRAINOSUS, NOT INTRACTABLE: ICD-10-CM

## 2023-09-22 RX ORDER — SUMATRIPTAN 100 MG/1
TABLET, FILM COATED ORAL
Qty: 16 TABLET | Refills: 3 | Status: SHIPPED | OUTPATIENT
Start: 2023-09-22 | End: 2024-02-13

## 2023-09-25 ENCOUNTER — TELEPHONE (OUTPATIENT)
Dept: FAMILY MEDICINE | Facility: CLINIC | Age: 43
End: 2023-09-25

## 2023-09-25 DIAGNOSIS — E66.01 MORBID OBESITY (H): ICD-10-CM

## 2023-09-26 RX ORDER — SEMAGLUTIDE 2.68 MG/ML
2 INJECTION, SOLUTION SUBCUTANEOUS
Qty: 3 ML | Refills: 0 | Status: SHIPPED | OUTPATIENT
Start: 2023-09-26 | End: 2023-09-27

## 2023-09-27 ENCOUNTER — TELEPHONE (OUTPATIENT)
Dept: FAMILY MEDICINE | Facility: CLINIC | Age: 43
End: 2023-09-27
Payer: COMMERCIAL

## 2023-09-27 ENCOUNTER — MYC REFILL (OUTPATIENT)
Dept: FAMILY MEDICINE | Facility: CLINIC | Age: 43
End: 2023-09-27
Payer: COMMERCIAL

## 2023-09-27 ENCOUNTER — MYC MEDICAL ADVICE (OUTPATIENT)
Dept: FAMILY MEDICINE | Facility: CLINIC | Age: 43
End: 2023-09-27
Payer: COMMERCIAL

## 2023-09-27 DIAGNOSIS — E66.01 MORBID OBESITY (H): ICD-10-CM

## 2023-09-27 DIAGNOSIS — E66.01 MORBID OBESITY (H): Primary | ICD-10-CM

## 2023-09-27 RX ORDER — SEMAGLUTIDE 2.68 MG/ML
2 INJECTION, SOLUTION SUBCUTANEOUS
Qty: 3 ML | Refills: 0 | OUTPATIENT
Start: 2023-09-27

## 2023-09-27 NOTE — TELEPHONE ENCOUNTER
Patient sent refill/mychart message below  Forwarding to PA team to initiate a 2nd PA for insurance to review    Refills have been requested for the following medications:         Semaglutide, 2 MG/DOSE, (OZEMPIC, 2 MG/DOSE,) 8 MG/3ML pen [Jennifer Maldonado]      Patient Comment: Just called my insurance and they cannot tell me the reason why the PA was denied- but they said to have the docotir send another PA over and try it again.  If that doesnt work- im not sure what other steps to take.      Preferred pharmacy: [facility] [id number] [place] [address]    Ancelmo Moe RN

## 2023-09-27 NOTE — TELEPHONE ENCOUNTER
Central Prior Authorization Team   Phone: [phone]    PA Initiation    Medication: OZEMPIC (2 MG/DOSE) 8 MG/3ML SC SOPN  Insurance Company: [employer] - Phone [phone] Fax [phone]  [id number] [place] [facility] [place] [place] - [address]  Filling Pharmacy Phone: [phone]  Filling Pharmacy Fax:    Start Date: 9/27/2023

## 2023-09-27 NOTE — TELEPHONE ENCOUNTER
Received call from Patient.  Looking for Ozempic refill.  Patient is due for her shot tonight.  Patient has called her insurance in the last and was told that ozempic was the only one that they covered for weight loss.  Please proceed.  Deangelo Roberts RN

## 2023-09-27 NOTE — TELEPHONE ENCOUNTER
A PA was already completed and denied for this medication and cannot be resubmitted.  It was denied because the patient does NOT have an FDA approved diagnosis.  This medication is ONLY covered for type 2 diabetes mellitus.  This medication is NOT covered for weight loss at all.     This is the question insurance asks

## 2023-09-27 NOTE — TELEPHONE ENCOUNTER
PRIOR AUTHORIZATION DENIED    Medication: OZEMPIC (2 MG/DOSE) 8 MG/3ML SC SOPN  Insurance Company: [employer] - Phone [phone] Fax [phone]  Denial Date: 9/27/2023  Denial Rational:         Appeal Information: N/A not being used for an FDA approved diagnosis      Patient Notified: no

## 2023-09-28 DIAGNOSIS — E66.01 MORBID OBESITY (H): ICD-10-CM

## 2023-09-28 RX ORDER — SEMAGLUTIDE 2.68 MG/ML
2 INJECTION, SOLUTION SUBCUTANEOUS
Refills: 0 | OUTPATIENT
Start: 2023-09-28

## 2023-09-28 NOTE — TELEPHONE ENCOUNTER
Patient called today today saying she spoke with her insurance company [employer] and they told her they never received a prior authorization from our PA team.  Patient stated she has been taking this medication for some time now and is worried about missing a dose.   They gave her a fax number and a phone number for the PA team to contact the insurance company.   Fax: [phone]  Phone: [phone]    Lo Swain RN on 9/28/2023 at 10:21 AM

## 2023-09-28 NOTE — TELEPHONE ENCOUNTER
Central Prior Authorization Team   Phone: [phone]    PA Initiation    Medication: OZEMPIC (2 MG/DOSE) 8 MG/3ML SC SOPN  Insurance Company: "[employer]" - Phone [phone] Fax [phone]  [id number] [place], [place] [facility] [address]  Filling Pharmacy Phone: [phone]  Filling Pharmacy Fax:    Start Date: 9/28/2023    I called and spoke with Siena MONTERO at the phone number the patient provided which is for the pharmacy benefit management company ("[employer] in the PA dept.  She states that yes a PA was completed and denied for ozempic for the patient not having the DX of type 2 DM.  She states if the patient called her plan directly they might not be able to see the PA since "[employer]" is a 3-party member.    Siena MONTERO also states that insurance is the one that sets the criteria for medications that they follow, so per Shannon's insurance plan, they do NOT cover the medication for weight loss only type 2 DM.

## 2023-09-28 NOTE — TELEPHONE ENCOUNTER
I had sent another PA yesterday and the PA was denied again as patient does not have DM.   Isai RN spoke with patient and re-forwarded the encounter to the PA team to review with additional information   Thank you,  Ancelmo Moe RN

## 2023-09-28 NOTE — TELEPHONE ENCOUNTER
Pt was instructed by her insurance to resend the PA.  Please send on to PA team to repeat the request.    Dr. Jennifer Maldonado, DO

## 2023-09-29 ENCOUNTER — TELEPHONE (OUTPATIENT)
Dept: FAMILY MEDICINE | Facility: CLINIC | Age: 43
End: 2023-09-29
Payer: COMMERCIAL

## 2023-09-29 NOTE — TELEPHONE ENCOUNTER
"See Kylet. Patient spoke with her insurance and they do require a PA for wegovy.  \"I just checked with the PA department with my insurance, and they don t show a PA for the wegovy.  They said that for wegovy - a doctor or her nurse will have to call and give a verbal PA over the phone.  The phone number for that is [phone].   Not sure what the status is on any of this, but hopefully we can get something figured out.  Thank you.\"    Please initiate a prior auth  Lo Swain RN on 9/29/2023 at 1:52 PM    "

## 2023-10-03 NOTE — TELEPHONE ENCOUNTER
Central Prior Authorization Team   Phone: [phone]    PA Initiation    Medication: Wegovy  Insurance Company: [employer] - Phone [phone] Fax [phone]  [id number] [place] [facility] [place] [place] - [address]  Filling Pharmacy Phone: [phone]  Filling Pharmacy Fax:    Start Date: 10/3/2023

## 2023-10-03 NOTE — TELEPHONE ENCOUNTER
She is due for weight loss follow up as well.  Please have her schedule a visit, video is okay.   we'll need an updated weight and I can discuss the medication with her and send prescription at that time as appropriate    Dr. Jennifer Maldonado, DO

## 2023-10-03 NOTE — TELEPHONE ENCOUNTER
PRIOR AUTHORIZATION DENIED    Medication: Wegovy    Denial Date: 10/3/2023    Denial Rational:  Per insurance, medication is excluded from patient's benefit plan and will not be covered. Review and appeal are not available because of this exclusion.                Appeal Information:  N/A

## 2023-10-09 ENCOUNTER — VIRTUAL VISIT (OUTPATIENT)
Dept: FAMILY MEDICINE | Facility: CLINIC | Age: 43
End: 2023-10-09
Payer: COMMERCIAL

## 2023-10-09 DIAGNOSIS — E66.01 MORBID OBESITY (H): Primary | ICD-10-CM

## 2023-10-09 PROCEDURE — 99213 OFFICE O/P EST LOW 20 MIN: CPT | Mod: VID | Performed by: FAMILY MEDICINE

## 2023-10-09 RX ORDER — TIRZEPATIDE 2.5 MG/.5ML
2.5 INJECTION, SOLUTION SUBCUTANEOUS
Qty: 2 ML | Refills: 0 | Status: SHIPPED | OUTPATIENT
Start: 2023-10-09 | End: 2023-11-01

## 2023-10-09 NOTE — PROGRESS NOTES
Melly is a 43 year old who is being evaluated via a billable video visit.      How would you like to obtain your AVS? MyChart  If the video visit is dropped, the invitation should be resent by: Text to cell phone: [phone]  Will anyone else be joining your video visit? No          A/P:      ICD-10-CM    1. Morbid obesity (H)  E66.01 tirzepatide (MOUNJARO) 2.5 MG/0.5ML pen        Reviewed use of Mounjaro including side effects.  Discussed that weight loss is an off label use of medication currently.  Prescription sent.  Pt will reach out for refills and dose adjustment.  Reviewed that I am skeptical that this will be covered as it is not yet approved for the obesity diagnosis.    If not covered reviewed other options including Contrave and phentermine.  Pt with h/o kidney stones so would avoid topiramate.    She will consider alternatives if Mounjaro is not covered.      Subjective   Melly is a 43 year old, presenting for the following health issues:  Recheck Medication (Weight management)      History of Present Illness       Reason for visit:  Change in medication    She eats 2-3 servings of fruits and vegetables daily.She consumes 0 sweetened beverage(s) daily.She exercises with enough effort to increase her heart rate 30 to 60 minutes per day.  She exercises with enough effort to increase her heart rate 5 days per week.   She is taking medications regularly.       Has been off the semaglutide for a couple of weeks    Current 234#.  Starting weight 265# on home scale.  No troublesome side effects that she noticed.  Was taking Senna every other day for constipation.  Really let the Ozempic was a huge help to her.  Frustrated from getting mixed messages from her insurance company.    Was told by her insurance that Mounjaro would be covered without need for a PA.        Review of Systems         Objective           Vitals:  No vitals were obtained today due to virtual visit.    Physical Exam   GENERAL: Healthy,  alert and no distress  EYES: Eyes grossly normal to inspection.  No discharge or erythema, or obvious scleral/conjunctival abnormalities.  RESP: No audible wheeze, cough, or visible cyanosis.  No visible retractions or increased work of breathing.    SKIN: Visible skin clear. No significant rash, abnormal pigmentation or lesions.  NEURO: Cranial nerves grossly intact.  Mentation and speech appropriate for age.  PSYCH: Mentation appears normal, affect normal/bright, judgement and insight intact, normal speech and appearance well-groomed.    Epic reviewed            Video-Visit Details    Type of service:  Video Visit     Originating Location (pt. Location): Home    Distant Location (provider location):  On-site  Platform used for Video Visit: Moya Okruga

## 2023-10-31 ENCOUNTER — MYC MEDICAL ADVICE (OUTPATIENT)
Dept: FAMILY MEDICINE | Facility: CLINIC | Age: 43
End: 2023-10-31
Payer: COMMERCIAL

## 2023-10-31 DIAGNOSIS — E66.01 MORBID OBESITY (H): Primary | ICD-10-CM

## 2023-11-01 RX ORDER — TIRZEPATIDE 5 MG/.5ML
5 INJECTION, SOLUTION SUBCUTANEOUS
Qty: 2 ML | Refills: 0 | Status: SHIPPED | OUTPATIENT
Start: 2023-11-01 | End: 2023-11-29

## 2023-11-29 ENCOUNTER — MYC MEDICAL ADVICE (OUTPATIENT)
Dept: FAMILY MEDICINE | Facility: CLINIC | Age: 43
End: 2023-11-29
Payer: COMMERCIAL

## 2023-11-29 DIAGNOSIS — E66.01 MORBID OBESITY (H): Primary | ICD-10-CM

## 2023-12-27 ENCOUNTER — MYC MEDICAL ADVICE (OUTPATIENT)
Dept: FAMILY MEDICINE | Facility: CLINIC | Age: 43
End: 2023-12-27
Payer: COMMERCIAL

## 2023-12-27 ENCOUNTER — MYC REFILL (OUTPATIENT)
Dept: FAMILY MEDICINE | Facility: CLINIC | Age: 43
End: 2023-12-27
Payer: COMMERCIAL

## 2023-12-27 DIAGNOSIS — E66.01 MORBID OBESITY (H): ICD-10-CM

## 2024-01-17 ENCOUNTER — MYC MEDICAL ADVICE (OUTPATIENT)
Dept: FAMILY MEDICINE | Facility: CLINIC | Age: 44
End: 2024-01-17
Payer: COMMERCIAL

## 2024-01-17 DIAGNOSIS — G47.33 OSA (OBSTRUCTIVE SLEEP APNEA): Primary | ICD-10-CM

## 2024-01-17 DIAGNOSIS — E66.01 MORBID OBESITY (H): ICD-10-CM

## 2024-01-23 DIAGNOSIS — E66.01 MORBID OBESITY (H): ICD-10-CM

## 2024-01-24 ENCOUNTER — TELEPHONE (OUTPATIENT)
Dept: FAMILY MEDICINE | Facility: CLINIC | Age: 44
End: 2024-01-24
Payer: COMMERCIAL

## 2024-01-24 RX ORDER — TIRZEPATIDE 10 MG/.5ML
INJECTION, SOLUTION SUBCUTANEOUS
Refills: 0 | OUTPATIENT
Start: 2024-01-24

## 2024-01-24 NOTE — TELEPHONE ENCOUNTER
Apparently pt had already been taking the 10mg dose.  It was unclear to me if she had ever picked that up.  Sent again for the next dose 12.5mg.  will await PA.    Dr. Jennifer Maldonado, DO

## 2024-01-24 NOTE — TELEPHONE ENCOUNTER
CENTRAL PRIOR AUTHORIZATION  [phone]      PRIOR AUTHORIZATION DENIED    Medication: MOUNJARO 12.5 MG/0.5ML SC SOPN  Insurance Company: [employer] Non-Specialty PA's - Phone [phone] Fax [phone]  Denial Date: 1/24/2024  Denial Reason(s):           Appeal Information: Excluded medications cannot be appealed. This class of medication and the medication itself is not on the formulary and there is no instance in which the insurance will approve the medication.      Patient Notified: No. Please note: Providers/Clinics are to notify the patients of the denial outcomes and steps going forward.

## 2024-02-01 NOTE — PROGRESS NOTES
Melly is a 43 year old who is being evaluated via a billable video visit.      How would you like to obtain your AVS? MyChart  If the video visit is dropped, the invitation should be resent by: Text to cell phone: [phone]  Will anyone else be joining your video visit? No          A/P:      ICD-10-CM    1. Morbid obesity (H)  E66.01 tirzepatide-Weight Management (ZEPBOUND) 12.5 MG/0.5ML prefilled pen        Discussed options for ongoing medication for weight management given Mounjaro no longer covered.    Pt requests script for Zepbound be sent in case insurance will cover product branded for weight management.  New script sent.    Briefly reviewed compounded semaglutide which is not currently recommended.     Discussed Contrave vs Qysmia as oral options.  Pt not a candidate for topirimate due to h/o kidney stones.  Interested in contrave     Would need nurse visit for BP check prior to initiation.  Side effects and restriction of use with opioids reviewed.      Subjective   Melly is a 43 year old, presenting for the following health issues:  Recheck Medication (For weight management )    History of Present Illness       Reason for visit:  All ready said when i booked the appt    She eats 2-3 servings of fruits and vegetables daily.She consumes 0 sweetened beverage(s) daily.She exercises with enough effort to increase her heart rate 30 to 60 minutes per day.  She exercises with enough effort to increase her heart rate 4 days per week.   She is taking medications regularly.       Since last visit Dad was diagnosed with lung cancer and passed a few days ago.    Feels she has done really well on the Monujaro.  Starting weight for Mounjaro was 234#, current weight 216#  very similar to the Wegovy for her.    Frustrated that it is no longer available to her.  Interested in other possible options            Objective           Vitals:  No vitals were obtained today due to virtual visit.    Physical Exam   GENERAL:  alert and no distress  EYES: Eyes grossly normal to inspection.  No discharge or erythema, or obvious scleral/conjunctival abnormalities.  RESP: No audible wheeze, cough, or visible cyanosis.    SKIN: Visible skin clear. No significant rash, abnormal pigmentation or lesions.  NEURO: Cranial nerves grossly intact.  Mentation and speech appropriate for age.  PSYCH: Appropriate affect, tone, and pace of words    EPic reviewed      Video-Visit Details    Type of service:  Video Visit     Originating Location (pt. Location): Home    Distant Location (provider location):  On-site  Platform used for Video Visit: Dale  Signed Electronically by: Jennifer Maldonado DO

## 2024-02-02 ENCOUNTER — VIRTUAL VISIT (OUTPATIENT)
Dept: FAMILY MEDICINE | Facility: CLINIC | Age: 44
End: 2024-02-02
Payer: COMMERCIAL

## 2024-02-02 ENCOUNTER — TELEPHONE (OUTPATIENT)
Dept: FAMILY MEDICINE | Facility: CLINIC | Age: 44
End: 2024-02-02

## 2024-02-02 DIAGNOSIS — E66.01 MORBID OBESITY (H): Primary | ICD-10-CM

## 2024-02-02 PROCEDURE — 99213 OFFICE O/P EST LOW 20 MIN: CPT | Mod: 95 | Performed by: FAMILY MEDICINE

## 2024-02-02 NOTE — LETTER
February 6, 2024      To whom it may concern,     Shannon Quiroz is currently under my medical care for management of obesity.  She suffers from obstructive sleep apnea and lumbar degenerative disc disease as complications of her weight.      She has already had success with weight loss on Mounjaro losing greater then 5% of her starting weight in 4 months of therapy.      She is unable to take Qsymia due to a personal history of nephrolithiasis.  Saxenda and Wegovy are not currently available for purchase.    Please reconsider your denial of coverage for Zepbound.           Dr. Jennifer Maldonado, DO

## 2024-02-04 NOTE — TELEPHONE ENCOUNTER
[facility] Prior Authorization Team   Phone: [phone]    PRIOR AUTHORIZATION DENIED    Medication: ZEPBOUND 12.5 MG/0.5ML SC SOAJ  Insurance Company: [employer] - Phone [phone] Fax [phone]  Denial Date: 2/2/2024  Denial Reason(s): MUST TRY/FAIL THREE PREFERRED DRUGS - ORLISTAT, QSYMIA, SAXENDA, OR WEGOVY      Appeal Information: IF PROVIDER WOULD LIKE TO APPEAL THIS DECISION PLEASE PROVIDE THE PA TEAM WITH A LETTER OF MEDICAL NECESSITY      Patient Notified: No

## 2024-02-06 NOTE — TELEPHONE ENCOUNTER
Medication Appeal Initiation    Medication: ZEPBOUND 12.5 MG/0.5ML SC SOAJ  Appeal Start Date:  2/6/2024  Insurance Company: [employer]  Insurance Phone:   Insurance Fax: [phone]  Comments:       FAXED LETTER OF MEDICAL NECESSITY AND CHART NOTES TO INSURANCE -

## 2024-02-13 ENCOUNTER — MYC REFILL (OUTPATIENT)
Dept: FAMILY MEDICINE | Facility: CLINIC | Age: 44
End: 2024-02-13
Payer: COMMERCIAL

## 2024-02-13 DIAGNOSIS — E66.01 MORBID OBESITY (H): ICD-10-CM

## 2024-02-13 DIAGNOSIS — G43.829 MENSTRUAL MIGRAINE WITHOUT STATUS MIGRAINOSUS, NOT INTRACTABLE: ICD-10-CM

## 2024-02-15 RX ORDER — SUMATRIPTAN 100 MG/1
TABLET, FILM COATED ORAL
Qty: 16 TABLET | Refills: 3 | Status: SHIPPED | OUTPATIENT
Start: 2024-02-15

## 2024-02-15 NOTE — TELEPHONE ENCOUNTER
Requested Prescriptions   Pending Prescriptions Disp Refills    SUMAtriptan (IMITREX) 100 MG tablet 16 tablet 3     Sig: TAKE ONE TAB BY MOUTH AT ONSET OF HEADACHE, MAY REPEAT ONCE IN 2 HOURS. MAX 200MG IN 24 HOURS       Serotonin Agonists Failed - 2/13/2024  4:28 PM        Failed - Serotonin Agonist request needs review.     Please review patient's record. If patient has had 8 or more treatments in the past month, please forward to provider.          Passed - Blood pressure under 140/90 in past 12 months     BP Readings from Last 3 Encounters:   06/06/23 132/86   09/27/22 138/88   09/19/22 (!) 147/102                 Passed - Recent (12 mo) or future (30 days) visit within the authorizing provider's specialty     The patient must have completed an in-person or virtual visit within the past 12 months or has a future visit scheduled within the next 90 days with the authorizing provider s specialty.  Urgent care and e-visits do not quality as an office visit for this protocol.          Passed - Medication is active on med list        Passed - Patient is age 18 or older        Passed - No active pregnancy on record        Passed - No positive pregnancy test in past 12 months         Last Written Prescription Date:  9/22/23  Last Fill Quantity: 16,  # refills: 3   Last office visit: 6/6/2023 ; last virtual visit: 2/2/2024 with prescribing provider:     Future Office Visit:  none.    Julie Behrendt RN

## 2024-02-20 NOTE — TELEPHONE ENCOUNTER
PRIOR AUTHORIZATION FOLLOW-UP  Per insurance - they have not yet received request. I have refaxed -

## 2024-02-27 NOTE — TELEPHONE ENCOUNTER
PRIOR AUTHORIZATION FOLLOW-UP  Per Mirella at [facility] - they have received the request on 2/20 and it is currently pending review. Due date March 15th.

## 2024-03-09 ENCOUNTER — MYC MEDICAL ADVICE (OUTPATIENT)
Dept: FAMILY MEDICINE | Facility: CLINIC | Age: 44
End: 2024-03-09
Payer: COMMERCIAL

## 2024-03-09 DIAGNOSIS — E66.01 MORBID OBESITY (H): Primary | ICD-10-CM

## 2024-03-09 NOTE — TELEPHONE ENCOUNTER
MEDICATION APPEAL DENIED    Medication: ZEPBOUND 12.5 MG/0.5ML SC SOAJ  Insurance Company: [employer]  Denial Date: 3/8/2024  Denial Reason(s): PLAN EXCLUSION      Second Level Appeal Information:       Patient Notified: No  Central Prior Authorization Team ONLY: Second level appeals will be managed by the clinic staff and provider. Please contact the [employer] Prior Authorization Team if additional information about the denial is needed.

## 2024-04-09 ENCOUNTER — OFFICE VISIT (OUTPATIENT)
Dept: FAMILY MEDICINE | Facility: CLINIC | Age: 44
End: 2024-04-09
Payer: COMMERCIAL

## 2024-04-09 VITALS
OXYGEN SATURATION: 97 % | HEIGHT: 66 IN | HEART RATE: 78 BPM | SYSTOLIC BLOOD PRESSURE: 113 MMHG | DIASTOLIC BLOOD PRESSURE: 84 MMHG | TEMPERATURE: 97.3 F | BODY MASS INDEX: 36.48 KG/M2 | WEIGHT: 227 LBS

## 2024-04-09 DIAGNOSIS — N63.0 MASS OF BREAST, UNSPECIFIED LATERALITY: Primary | ICD-10-CM

## 2024-04-09 PROCEDURE — 99213 OFFICE O/P EST LOW 20 MIN: CPT | Performed by: NURSE PRACTITIONER

## 2024-04-09 ASSESSMENT — PATIENT HEALTH QUESTIONNAIRE - PHQ9
SUM OF ALL RESPONSES TO PHQ QUESTIONS 1-9: 0
SUM OF ALL RESPONSES TO PHQ QUESTIONS 1-9: 0
10. IF YOU CHECKED OFF ANY PROBLEMS, HOW DIFFICULT HAVE THESE PROBLEMS MADE IT FOR YOU TO DO YOUR WORK, TAKE CARE OF THINGS AT HOME, OR GET ALONG WITH OTHER PEOPLE: NOT DIFFICULT AT ALL

## 2024-04-09 NOTE — PROGRESS NOTES
"    Assessment & Plan     Mass of breast, unspecified laterality  Further plan pending imaging  - MA Diagnostic Digital Bilateral; Future  - US Breast Left Limited 1-3 Quadrants; Future      BMI  Estimated body mass index is 36.64 kg/m  as calculated from the following:    Height as of this encounter: 1.676 m (5' 6\").    Weight as of this encounter: 103 kg (227 lb).       Allison Pickard is a 43 year old, presenting for the following health issues:  lump on chest        4/9/2024     7:26 AM   Additional Questions   Roomed by estrella     Via the Health Maintenance questionnaire, the patient has reported the following services have been completed -Cervical Cancer Screening, this information has been sent to the abstraction team.  History of Present Illness       Reason for visit:  Lump on chest  Symptom onset:  3-7 days ago  Symptoms include:  Lump and discomfort  Symptom intensity:  Mild  Symptom progression:  Staying the same  Had these symptoms before:  No    She eats 4 or more servings of fruits and vegetables daily.She consumes 0 sweetened beverage(s) daily.She exercises with enough effort to increase her heart rate 30 to 60 minutes per day.    She is taking medications regularly.       Breast lump  Lump, sternal but closer to the left breast  Feels like a gummy bear  There is some sensitivity with pushing on it, but not too painful.   Denies overlying skin changes  Wonders if a pimple but has strong family hx of breast cancer in mom and maternal grandmother.            Review of Systems  Constitutional, neuro, ENT, endocrine, pulmonary, cardiac, gastrointestinal, genitourinary, musculoskeletal, integument and psychiatric systems are negative, except as otherwise noted.      Objective    /84   Pulse 78   Temp 97.3  F (36.3  C)   Ht 1.676 m (5' 6\")   Wt 103 kg (227 lb)   SpO2 97%   BMI 36.64 kg/m    Body mass index is 36.64 kg/m .  Physical Exam   GENERAL: alert and no distress  EYES: Eyes grossly " normal to inspection, PERRL and conjunctivae and sclerae normal  RESP: breathing is unlabored  BREAST: no palpable axillary masses or adenopathy, mass over sternum, just below skin, closer to left breast, firm and slightly mobile, no overlying skin abnormalities.   MS: no gross musculoskeletal defects noted, no edema  SKIN: no suspicious lesions or rashes  NEURO: Normal strength and tone, mentation intact and speech normal  PSYCH: mentation appears normal, affect normal/bright

## 2024-04-17 DIAGNOSIS — E66.01 MORBID OBESITY (H): ICD-10-CM

## 2024-04-18 ENCOUNTER — MYC MEDICAL ADVICE (OUTPATIENT)
Dept: FAMILY MEDICINE | Facility: CLINIC | Age: 44
End: 2024-04-18
Payer: COMMERCIAL

## 2024-04-18 ENCOUNTER — MYC REFILL (OUTPATIENT)
Dept: FAMILY MEDICINE | Facility: CLINIC | Age: 44
End: 2024-04-18
Payer: COMMERCIAL

## 2024-04-18 DIAGNOSIS — E66.01 MORBID OBESITY (H): ICD-10-CM

## 2024-04-23 ENCOUNTER — ANCILLARY PROCEDURE (OUTPATIENT)
Dept: MAMMOGRAPHY | Facility: CLINIC | Age: 44
End: 2024-04-23
Attending: NURSE PRACTITIONER
Payer: COMMERCIAL

## 2024-04-23 ENCOUNTER — ANCILLARY ORDERS (OUTPATIENT)
Dept: FAMILY MEDICINE | Facility: CLINIC | Age: 44
End: 2024-04-23

## 2024-04-23 DIAGNOSIS — N63.0 MASS OF BREAST, UNSPECIFIED LATERALITY: Primary | ICD-10-CM

## 2024-04-23 DIAGNOSIS — N63.0 MASS OF BREAST, UNSPECIFIED LATERALITY: ICD-10-CM

## 2024-04-23 PROCEDURE — 77062 BREAST TOMOSYNTHESIS BI: CPT

## 2024-04-23 PROCEDURE — 76642 ULTRASOUND BREAST LIMITED: CPT | Mod: LT

## 2024-05-08 DIAGNOSIS — E66.01 MORBID OBESITY (H): ICD-10-CM

## 2024-05-14 ENCOUNTER — MYC MEDICAL ADVICE (OUTPATIENT)
Dept: FAMILY MEDICINE | Facility: CLINIC | Age: 44
End: 2024-05-14
Payer: COMMERCIAL

## 2024-06-13 ENCOUNTER — MYC REFILL (OUTPATIENT)
Dept: FAMILY MEDICINE | Facility: CLINIC | Age: 44
End: 2024-06-13
Payer: COMMERCIAL

## 2024-06-13 DIAGNOSIS — E66.01 MORBID OBESITY (H): ICD-10-CM

## 2024-06-13 NOTE — TELEPHONE ENCOUNTER
Pending Prescriptions:                       Disp   Refills    Semaglutide-Weight Management (WEGOVY) 1 *2 mL   0            Sig: Inject 1 mg Subcutaneous once a week    Routing refill request to provider for review/approval because:  Drug not on the [facility] refill protocol     Patient comment: Brynn just taken my last injection and am ready for the 2.0.  I have requested the refil at the compound pharmacy.         Claus Marino RN

## 2024-07-09 ENCOUNTER — MYC REFILL (OUTPATIENT)
Dept: FAMILY MEDICINE | Facility: CLINIC | Age: 44
End: 2024-07-09
Payer: COMMERCIAL

## 2024-07-09 DIAGNOSIS — E66.01 MORBID OBESITY (H): ICD-10-CM

## 2024-07-28 DIAGNOSIS — F33.0 MAJOR DEPRESSIVE DISORDER, RECURRENT EPISODE, MILD (H): ICD-10-CM

## 2024-07-29 RX ORDER — VENLAFAXINE HYDROCHLORIDE 37.5 MG/1
37.5 CAPSULE, EXTENDED RELEASE ORAL DAILY
Qty: 90 CAPSULE | Refills: 0 | Status: SHIPPED | OUTPATIENT
Start: 2024-07-29

## 2024-09-08 ENCOUNTER — HEALTH MAINTENANCE LETTER (OUTPATIENT)
Age: 44
End: 2024-09-08

## 2024-10-14 ENCOUNTER — TRANSFERRED RECORDS (OUTPATIENT)
Dept: HEALTH INFORMATION MANAGEMENT | Facility: CLINIC | Age: 44
End: 2024-10-14
Payer: COMMERCIAL